# Patient Record
Sex: FEMALE | Race: WHITE | Employment: OTHER | URBAN - METROPOLITAN AREA
[De-identification: names, ages, dates, MRNs, and addresses within clinical notes are randomized per-mention and may not be internally consistent; named-entity substitution may affect disease eponyms.]

---

## 2017-04-10 ENCOUNTER — APPOINTMENT (OUTPATIENT)
Dept: GENERAL RADIOLOGY | Age: 62
End: 2017-04-10
Attending: EMERGENCY MEDICINE
Payer: COMMERCIAL

## 2017-04-10 ENCOUNTER — HOSPITAL ENCOUNTER (EMERGENCY)
Age: 62
Discharge: HOME | End: 2017-04-10
Attending: EMERGENCY MEDICINE
Payer: COMMERCIAL

## 2017-04-10 VITALS
HEIGHT: 64 IN | DIASTOLIC BLOOD PRESSURE: 86 MMHG | SYSTOLIC BLOOD PRESSURE: 125 MMHG | TEMPERATURE: 98.3 F | WEIGHT: 144.84 LBS | HEART RATE: 71 BPM | RESPIRATION RATE: 16 BRPM | OXYGEN SATURATION: 100 % | BODY MASS INDEX: 24.73 KG/M2

## 2017-04-10 DIAGNOSIS — I48.92 ATRIAL FLUTTER WITH RAPID VENTRICULAR RESPONSE (HCC): Primary | ICD-10-CM

## 2017-04-10 LAB
ALBUMIN SERPL BCP-MCNC: 3.8 G/DL (ref 3.5–5)
ALBUMIN/GLOB SERPL: 1.1 {RATIO} (ref 1.1–2.2)
ALP SERPL-CCNC: 68 U/L (ref 45–117)
ALT SERPL-CCNC: 22 U/L (ref 12–78)
ANION GAP BLD CALC-SCNC: 11 MMOL/L (ref 5–15)
AST SERPL W P-5'-P-CCNC: 19 U/L (ref 15–37)
ATRIAL RATE: 375 BPM
BASOPHILS # BLD AUTO: 0 K/UL (ref 0–0.1)
BASOPHILS # BLD: 0 % (ref 0–1)
BILIRUB SERPL-MCNC: 0.3 MG/DL (ref 0.2–1)
BUN SERPL-MCNC: 18 MG/DL (ref 6–20)
BUN/CREAT SERPL: 19 (ref 12–20)
CALCIUM SERPL-MCNC: 9.1 MG/DL (ref 8.5–10.1)
CALCULATED R AXIS, ECG10: 70 DEGREES
CALCULATED T AXIS, ECG11: 40 DEGREES
CHLORIDE SERPL-SCNC: 105 MMOL/L (ref 97–108)
CK SERPL-CCNC: 153 U/L (ref 26–192)
CO2 SERPL-SCNC: 28 MMOL/L (ref 21–32)
CREAT SERPL-MCNC: 0.94 MG/DL (ref 0.55–1.02)
DIAGNOSIS, 93000: NORMAL
EOSINOPHIL # BLD: 0.2 K/UL (ref 0–0.4)
EOSINOPHIL NFR BLD: 2 % (ref 0–7)
ERYTHROCYTE [DISTWIDTH] IN BLOOD BY AUTOMATED COUNT: 13.5 % (ref 11.5–14.5)
GLOBULIN SER CALC-MCNC: 3.4 G/DL (ref 2–4)
GLUCOSE SERPL-MCNC: 142 MG/DL (ref 65–100)
HCT VFR BLD AUTO: 39.7 % (ref 35–47)
HGB BLD-MCNC: 13.4 G/DL (ref 11.5–16)
LYMPHOCYTES # BLD AUTO: 27 % (ref 12–49)
LYMPHOCYTES # BLD: 2.7 K/UL (ref 0.8–3.5)
MAGNESIUM SERPL-MCNC: 2.2 MG/DL (ref 1.6–2.4)
MCH RBC QN AUTO: 30.5 PG (ref 26–34)
MCHC RBC AUTO-ENTMCNC: 33.8 G/DL (ref 30–36.5)
MCV RBC AUTO: 90.4 FL (ref 80–99)
MONOCYTES # BLD: 1.1 K/UL (ref 0–1)
MONOCYTES NFR BLD AUTO: 11 % (ref 5–13)
NEUTS SEG # BLD: 5.8 K/UL (ref 1.8–8)
NEUTS SEG NFR BLD AUTO: 60 % (ref 32–75)
PLATELET # BLD AUTO: 316 K/UL (ref 150–400)
POTASSIUM SERPL-SCNC: 4.1 MMOL/L (ref 3.5–5.1)
PROT SERPL-MCNC: 7.2 G/DL (ref 6.4–8.2)
Q-T INTERVAL, ECG07: 328 MS
QRS DURATION, ECG06: 74 MS
QTC CALCULATION (BEZET), ECG08: 420 MS
RBC # BLD AUTO: 4.39 M/UL (ref 3.8–5.2)
SODIUM SERPL-SCNC: 144 MMOL/L (ref 136–145)
TROPONIN I SERPL-MCNC: <0.04 NG/ML
TSH SERPL DL<=0.05 MIU/L-ACNC: 2.09 UIU/ML (ref 0.36–3.74)
VENTRICULAR RATE, ECG03: 99 BPM
WBC # BLD AUTO: 9.8 K/UL (ref 3.6–11)

## 2017-04-10 PROCEDURE — 74011250636 HC RX REV CODE- 250/636: Performed by: EMERGENCY MEDICINE

## 2017-04-10 PROCEDURE — 99285 EMERGENCY DEPT VISIT HI MDM: CPT

## 2017-04-10 PROCEDURE — 84443 ASSAY THYROID STIM HORMONE: CPT | Performed by: EMERGENCY MEDICINE

## 2017-04-10 PROCEDURE — 82550 ASSAY OF CK (CPK): CPT | Performed by: EMERGENCY MEDICINE

## 2017-04-10 PROCEDURE — 74011250637 HC RX REV CODE- 250/637: Performed by: EMERGENCY MEDICINE

## 2017-04-10 PROCEDURE — 36415 COLL VENOUS BLD VENIPUNCTURE: CPT | Performed by: EMERGENCY MEDICINE

## 2017-04-10 PROCEDURE — 71010 XR CHEST PORT: CPT

## 2017-04-10 PROCEDURE — 84484 ASSAY OF TROPONIN QUANT: CPT | Performed by: EMERGENCY MEDICINE

## 2017-04-10 PROCEDURE — 93005 ELECTROCARDIOGRAM TRACING: CPT

## 2017-04-10 PROCEDURE — 80053 COMPREHEN METABOLIC PANEL: CPT | Performed by: EMERGENCY MEDICINE

## 2017-04-10 PROCEDURE — 83735 ASSAY OF MAGNESIUM: CPT | Performed by: EMERGENCY MEDICINE

## 2017-04-10 PROCEDURE — 85025 COMPLETE CBC W/AUTO DIFF WBC: CPT | Performed by: EMERGENCY MEDICINE

## 2017-04-10 RX ORDER — MINERAL OIL
180 ENEMA (ML) RECTAL
Status: ON HOLD | COMMUNITY
End: 2017-08-31

## 2017-04-10 RX ORDER — METOPROLOL SUCCINATE 25 MG/1
12.5 TABLET, EXTENDED RELEASE ORAL DAILY
Qty: 30 TAB | Refills: 0 | Status: ON HOLD | OUTPATIENT
Start: 2017-04-10 | End: 2017-06-01

## 2017-04-10 RX ORDER — BENZOCAINE .13; .15; .5; 2 G/100G; G/100G; G/100G; G/100G
GEL ORAL
COMMUNITY
End: 2019-06-29

## 2017-04-10 RX ORDER — METOPROLOL SUCCINATE 25 MG/1
12.5 TABLET, EXTENDED RELEASE ORAL
Status: COMPLETED | OUTPATIENT
Start: 2017-04-10 | End: 2017-04-10

## 2017-04-10 RX ORDER — PANTOPRAZOLE SODIUM 40 MG/1
40 TABLET, DELAYED RELEASE ORAL DAILY
Qty: 30 TAB | Refills: 0 | Status: SHIPPED | OUTPATIENT
Start: 2017-04-10 | End: 2017-05-10

## 2017-04-10 RX ORDER — GUAIFENESIN 100 MG/5ML
162 LIQUID (ML) ORAL
Status: COMPLETED | OUTPATIENT
Start: 2017-04-10 | End: 2017-04-10

## 2017-04-10 RX ORDER — PREDNISOLONE SODIUM PHOSPHATE 10 MG/ML
1 SOLUTION/ DROPS OPHTHALMIC 4 TIMES DAILY
Status: ON HOLD | COMMUNITY
End: 2017-08-31

## 2017-04-10 RX ORDER — AZELASTINE HYDROCHLORIDE 0.5 MG/ML
SOLUTION/ DROPS OPHTHALMIC 2 TIMES DAILY
COMMUNITY
End: 2017-06-03

## 2017-04-10 RX ADMIN — ASPIRIN 81 MG 162 MG: 81 TABLET ORAL at 18:39

## 2017-04-10 RX ADMIN — METOPROLOL SUCCINATE 12.5 MG: 25 TABLET, EXTENDED RELEASE ORAL at 18:39

## 2017-04-10 RX ADMIN — SODIUM CHLORIDE 1000 ML: 900 INJECTION, SOLUTION INTRAVENOUS at 16:33

## 2017-04-10 NOTE — DISCHARGE INSTRUCTIONS
Learning About Atrial Fibrillation  What is atrial fibrillation? Atrial fibrillation (say \"AY-tree-gary alm-qwdl-ZAC-shun\") is the most common type of irregular heartbeat (arrhythmia). Normally, the heart beats in a strong, steady rhythm. In atrial fibrillation, a problem with the heart's electrical system causes the two upper parts of the heart (the atria) to quiver, or fibrillate. Your heart rate also may be faster than normal.  Atrial fibrillation can be dangerous because if the heartbeat isn't strong and steady, blood can collect, or pool, in the atria. And pooled blood is more likely to form clots. Clots can travel to the brain, block blood flow, and cause a stroke. Atrial fibrillation can also lead to heart failure. Treatment for atrial fibrillation helps prevent stroke and heart failure. It also helps relieve symptoms. Atrial fibrillation is often caused by another heart problem. It may happen after heart surgery. It may also be caused by other problems, such as an overactive thyroid gland or lung disease. Many people with atrial fibrillation are able to live full and active lives. What are the symptoms? Some people feel symptoms when they have episodes of atrial fibrillation. But other people don't notice any symptoms. If you have symptoms, you may feel:  · A fluttering, racing, or pounding feeling in your chest called palpitations. · Weak or tired. · Dizzy or lightheaded. · Short of breath. · Chest pain. · Confused. You may notice signs of atrial fibrillation when you check your pulse. Your pulse may seem uneven or fast.  What can you expect when you have atrial fibrillation? At first, spells of atrial fibrillation may come on suddenly and last a short time. It may go away on its own or it goes away after treatment. This is called paroxysmal atrial fibrillation. Over time, the spells may last longer and occur more often. They often don't go away on their own.   How is it treated? Treatments can help you feel better and prevent future problems, especially stroke and heart failure. The main types of treatment slow the heart rate, control the heart rhythm, and help prevent stroke. Your treatment will depend on the cause of your atrial fibrillation, your symptoms, and your risk for stroke. · Heart rate treatment. Medicine may be used to slow your heart rate. Your heartbeat may still be irregular. But these medicines keep your heart from beating too fast. They may also help relieve your symptoms. · Heart rhythm treatment. Different treatments may be used to try to stop atrial fibrillation and keep it from returning. They can also relieve symptoms. These treatments include medicine, electrical cardioversion to shock the heart back to a normal rhythm, a procedure called catheter ablation, and heart surgery. · Stroke prevention. You and your doctor can decide how to lower your risk. You may decide to take a blood-thinning medicine, such as aspirin or an anticoagulant. How can you live well with it? You can live well and help manage atrial fibrillation by having a heart-healthy lifestyle. To have a heart-healthy lifestyle:  · Don't smoke. · Eat heart-healthy foods. · Be active. Talk to your doctor about what type and level of exercise is safe for you. · Stay at a healthy weight. Lose weight if you need to. · Manage stress. · Avoid alcohol if it triggers symptoms. · Manage other health problems such as high blood pressure, high cholesterol, and diabetes. · Avoid getting sick from the flu. Get a flu shot every year. When should you call for help? Call 911 anytime you think you may need emergency care. For example, call if:  · You have symptoms of a stroke. These may include:  ¨ Sudden numbness, tingling, weakness, or loss of movement in your face, arm, or leg, especially on only one side of your body. ¨ Sudden vision changes. ¨ Sudden trouble speaking.   ¨ Sudden confusion or trouble understanding simple statements. ¨ Sudden problems with walking or balance. ¨ A sudden, severe headache that is different from past headaches. Call your doctor now or seek immediate medical care if:  · You have new or increased shortness of breath. · You feel dizzy or lightheaded, or you feel like you may faint. Watch closely for changes in your health, and be sure to contact your doctor if you have any problems. Follow-up care is a key part of your treatment and safety. Be sure to make and go to all appointments, and call your doctor if you are having problems. It's also a good idea to know your test results and keep a list of the medicines you take. Where can you learn more? Go to http://mo-miguel.info/. Enter [phone] in the search box to learn more about \"Learning About Atrial Fibrillation. \"  Current as of: March 28, 2016  Content Version: 11.2  © 2265-6471 [employer]. Care instructions adapted under license by [employer] (which disclaims liability or warranty for this information). If you have questions about a medical condition or this instruction, always ask your healthcare professional. [place] [address] any warranty or liability for your use of this information. Deciding Between Electrical Cardioversion and Rate Control Medicines for Atrial Fibrillation  What is atrial fibrillation? Atrial fibrillation (say \"AY-tree-gary vpm-guqt-YZQ-shun\") is a kind of uneven heartbeat. It can make you feel lightheaded and dizzy. You may feel weak. It also can make you more likely to have a stroke. Electrical cardioversion can return your heart to a normal rhythm. First you'll get medicines to make you sleepy and control pain. Then your doctor will use patches to send an electric current to your heart. This resets the rhythm of your heart. Not everyone with atrial fibrillation needs this treatment.  For some people, taking medicines may be better. Most people can live with an uneven heartbeat. It just has to be kept under control so the heart does not beat too fast.  Use this information to help you and your doctor decide which treatment to choose for atrial fibrillation. What are monte points about this decision? · Electrical cardioversion can return your heart to a normal rhythm. But the problem can come back. The longer you have had atrial fibrillation, the more likely it is to come back after this treatment. · Cardioversion may not work as well when an uneven heartbeat is caused by another heart disease, such as heart failure. · If your symptoms bother you a lot, you may want to try cardioversion. But even if it works, you may still need to take blood thinners to prevent a stroke. · If you don't have symptoms, or if they don't bother you much, you can try medicines to slow your heart rate. And you can take blood thinners to prevent a stroke. · Cardioversion does have risks, such as stroke. Discuss the risks with your doctor. Make sure you understand them. · You may have more than one heart problem. Cardioversion doesn't work as well if you have more than one heart problem. Why might you choose electrical cardioversion? · It restores the normal heart rhythm for most people. · The idea of having an electric shock does not bother you. · Your symptoms bother you a lot. · You have had atrial fibrillation just one time. · You do not have other heart problems. · You may not have to take as many medicines. Or you may not need to take them as long. Why might you choose rate-control medicines? · These medicines keep many people from having symptoms. · You prefer to take medicines rather than have an electric shock. · Your symptoms don't bother you much. · If these medicines don't work, you can still try electrical cardioversion.   Your decision  Thinking about the facts and your feelings can help you make a decision that is right for you. Be sure you understand the benefits and risks of your options. And think about what else you need to do before you make the decision. Where can you learn more? Go to http://mo-miguel.info/. Enter B179 in the search box to learn more about \"Deciding Between Electrical Cardioversion and Rate Control Medicines for Atrial Fibrillation. \"  Current as of: January 27, 2016  Content Version: 11.2  © 4550-6066 [employer]. Care instructions adapted under license by [employer] (which disclaims liability or warranty for this information). If you have questions about a medical condition or this instruction, always ask your healthcare professional. [address] any warranty or liability for your use of this information. 2 BABY ASPIRIN EVERYDAY (162mg)     CALL DR. MCKEON'S OFFICE IN THE MORNING, I DISCUSSED YOUR CARE WITH DR. Crawford Come THIS AFTERNOON WHO RECOMMENDED YOU FOLLOW-UP WITH HIM TO DISCUSS OTHER POSSIBLE TREATMENT PLANS

## 2017-04-10 NOTE — ED NOTES
Prior to the dizziness, pt was mulching in the yard. She became dizzy and lost vision in her left eye for approx 5 min.

## 2017-04-10 NOTE — ED PROVIDER NOTES
HPI Comments: Aaliyah Snyder is a 57 y.o. female with pertinent PMHx of anxiety, SVT, PUD and GERD presenting ambulatory to the ED c/o palpitations x ~1405 today, which began as she was working in her yard today. Pt states that \"I just don't feel right\". Pt notes an associated symptom of generalized weakness. Pt states that she experienced sudden onset of left eye blindness with onset of her palpitations. Pt states that she thought it was retinal detachment, but states that it independently resolved after ~5-6 minutes. Pt states \"it started out as just bright and then the color came back\". Pt states that she did not call EMS, as she wanted to get \"cleaned up\" after working in the yard. Pt states that she experienced another episode of left eye blindness when she bent over as she was getting ready to come to the ED. Pt denies any recent increase of caffeine intake. Pt denies any history of thyroid problems. Pt notes that she has had A fib ~3-4 years ago, but did not need cardioversion in the ED as she independently cardioverted with IV placement. Pt states that she followed up with a cardiologist after this episode and had a normal Echo. Pt states that her resting heart rate is in the 50s and notes that she exercises regularly. Pt denies any blood thinner or ASA use. Pt notes N/V/D over 1 week ago and denies any current symptoms. Pt specifically denies any chest pain or SOB. PCP: Fara Yao MD  Cardiologist: Dr. Luma Mercado Hx: - tobacco use, + alcohol use, - illicit drug use    There are no other complaints, changes, or physical findings at this time. The history is provided by the patient. No  was used.         Past Medical History:   Diagnosis Date    Depression     GERD (gastroesophageal reflux disease)     H/O hammer toe correction     Nausea & vomiting     Osteoarthritis     Psychiatric disorder     anxiety, seasonal affective disorder    PUD (peptic ulcer disease)  SVT (supraventricular tachycardia) (HCC)        Past Surgical History:   Procedure Laterality Date    HX BREAST BIOPSY Right     40 years ago    HX CATARACT REMOVAL      bilateral    HX GYN      HX HEENT      L pupil does not react; h/o bilateral retina surgery    HX HYSTERECTOMY      HX ORTHOPAEDIC      left thumb surgery    HX ORTHOPAEDIC      right hammer toe repair with screw    HX RETINAL DETACHMENT REPAIR      bilateral     MS EGD TRANSORAL CONTROL BLEEDING ANY METHOD  10/2/2013              Family History:   Problem Relation Age of Onset    Heart Disease Mother     Kidney Disease Mother        Social History     Social History    Marital status:      Spouse name: N/A    Number of children: N/A    Years of education: N/A     Occupational History    Not on file. Social History Main Topics    Smoking status: Never Smoker    Smokeless tobacco: Never Used    Alcohol use 0.0 oz/week      Comment: 5 per week    Drug use: No    Sexual activity: Yes     Partners: Male     Birth control/ protection: Surgical     Other Topics Concern    Not on file     Social History Narrative         ALLERGIES: Latex; Adhesive tape-silicones; Erythromycin; Neomycin; Nickel; and Nsaids (non-steroidal anti-inflammatory drug)    Review of Systems   Constitutional: Negative for appetite change, chills, fatigue and fever. HENT: Negative. Negative for congestion, rhinorrhea, sinus pressure and sore throat. Eyes: Positive for visual disturbance (left eye with blindness). Respiratory: Negative. Negative for cough, choking, chest tightness, shortness of breath and wheezing. Cardiovascular: Positive for palpitations. Negative for chest pain and leg swelling. Gastrointestinal: Negative for abdominal pain, constipation, diarrhea, nausea and vomiting. Endocrine: Negative. Genitourinary: Negative. Negative for difficulty urinating, dysuria, flank pain and urgency. Musculoskeletal: Negative. Skin: Negative. Neurological: Positive for weakness (diffuse). Negative for dizziness, speech difficulty, light-headedness, numbness and headaches. Psychiatric/Behavioral: Negative. All other systems reviewed and are negative. Vitals:    04/10/17 1514   BP: 125/86   Pulse: 71   Resp: 16   Temp: 98.3 °F (36.8 °C)   SpO2: 100%   Weight: 65.7 kg (144 lb 13.5 oz)   Height: 5' 4\" (1.626 m)            Physical Exam   Constitutional: She is oriented to person, place, and time. She appears well-developed and well-nourished. No distress. HENT:   Head: Normocephalic and atraumatic. Mouth/Throat: Oropharynx is clear and moist. No oropharyngeal exudate. Eyes: Conjunctivae and EOM are normal. Pupils are equal, round, and reactive to light. Neck: Normal range of motion. Neck supple. No JVD present. No tracheal deviation present. Cardiovascular: Normal heart sounds and intact distal pulses. No murmur heard. Tachycardia, irregular   Pulmonary/Chest: Effort normal and breath sounds normal. No stridor. No respiratory distress. She has no wheezes. She has no rales. She exhibits no tenderness. Abdominal: Soft. She exhibits no distension. There is no tenderness. There is no rebound and no guarding. Musculoskeletal: Normal range of motion. She exhibits no edema or tenderness. Neurological: She is alert and oriented to person, place, and time. No cranial nerve deficit. No focal motor or sensory deficits, no speech  difficulty   Skin: Skin is warm and dry. She is not diaphoretic. Psychiatric: She has a normal mood and affect. Her behavior is normal.   Nursing note and vitals reviewed.        MDM  Number of Diagnoses or Management Options  Atrial flutter with rapid ventricular response [facility]):   Diagnosis management comments: DDx: Osvaldo Berg with RVR, arrhythmia, SVT, electrolyte abnormality, thyroid dysfunction, TIA       Amount and/or Complexity of Data Reviewed  Clinical lab tests: ordered and reviewed  Tests in the radiology section of CPT®: ordered and reviewed  Tests in the medicine section of CPT®: ordered and reviewed  Review and summarize past medical records: yes  Discuss the patient with other providers: yes (Cardiology)  Independent visualization of images, tracings, or specimens: yes    Patient Progress  Patient progress: stable    ED Course       Procedures     Initial EKG- Atrial flutter w/ variable block, rate 99, normal qrs, normal axis, no acute ST-T wave changes, Mercy Bowman DO    Progress Note:  4:16 PM  Made aware by nursing staff that the pt has returned to normal rhythm after IV placement. Written by Akbar Thornton, ED Scribe, as dictated by Fidelia Casas DO. Repeat EKG- Sinus, w/ PAC, rate 78, normal axis, normal pr/qrs, no acute ST-T wave changes, Mercy Bowman DO      Consult Note:  5:09 PM  Fidelia Casas DO spoke with Dr. Silvio Ferrera,  Specialty: Cardiology  Discussed pt's hx, disposition, and available diagnostic and imaging results. Reviewed care plans. Consultant agrees with plans as outlined. Dr. Silvio Ferrera advises starting the pt on 2 baby ASA and Toprol 12.5 mg daily. Pt should follow up with Dr. Celena Davey in the office. Written by Akbar Thornton, ED Scribe, as dictated by Fidelia Casas DO. Progress Note:  6:14 PM  At time of discharge, pt requests Protonix as she has a hx of bleeding ulcers and is considered w/ ASA therapy. Pt instructed if significant fatigue, bradycardia w/ dizziness, to hold BB until follow-up with Cardiology    Written by Kostas Davila.  Tj Thornton, ED Scribe, as dictated by Fidelia Casas DO.      LABORATORY TESTS:  Recent Results (from the past 12 hour(s))   EKG, 12 LEAD, INITIAL    Collection Time: 04/10/17  3:21 PM   Result Value Ref Range    Ventricular Rate 99 BPM    Atrial Rate 375 BPM    QRS Duration 74 ms    Q-T Interval 328 ms    QTC Calculation (Bezet) 420 ms    Calculated R Axis 70 degrees    Calculated T Axis 40 degrees    Diagnosis Atrial flutter with variable AV block  When compared with ECG of 19-JUL-2012 19:01,  Atrial flutter has replaced Sinus rhythm  ST no longer elevated in Anterior leads  Confirmed by Mary Moore ([id number]) on 4/10/2017 5:27:49 PM     CBC WITH AUTOMATED DIFF    Collection Time: 04/10/17  3:24 PM   Result Value Ref Range    WBC 9.8 3.6 - 11.0 K/uL    RBC 4.39 3.80 - 5.20 M/uL    HGB 13.4 11.5 - 16.0 g/dL    HCT 39.7 35.0 - 47.0 %    MCV 90.4 80.0 - 99.0 FL    MCH 30.5 26.0 - 34.0 PG    MCHC 33.8 30.0 - 36.5 g/dL    RDW 13.5 11.5 - 14.5 %    PLATELET 352 889 - 397 K/uL    NEUTROPHILS 60 32 - 75 %    LYMPHOCYTES 27 12 - 49 %    MONOCYTES 11 5 - 13 %    EOSINOPHILS 2 0 - 7 %    BASOPHILS 0 0 - 1 %    ABS. NEUTROPHILS 5.8 1.8 - 8.0 K/UL    ABS. LYMPHOCYTES 2.7 0.8 - 3.5 K/UL    ABS. MONOCYTES 1.1 (H) 0.0 - 1.0 K/UL    ABS. EOSINOPHILS 0.2 0.0 - 0.4 K/UL    ABS. BASOPHILS 0.0 0.0 - 0.1 K/UL   METABOLIC PANEL, COMPREHENSIVE    Collection Time: 04/10/17  3:24 PM   Result Value Ref Range    Sodium 144 136 - 145 mmol/L    Potassium 4.1 3.5 - 5.1 mmol/L    Chloride 105 97 - 108 mmol/L    CO2 28 21 - 32 mmol/L    Anion gap 11 5 - 15 mmol/L    Glucose 142 (H) 65 - 100 mg/dL    BUN 18 6 - 20 MG/DL    Creatinine 0.94 0.55 - 1.02 MG/DL    BUN/Creatinine ratio 19 12 - 20      GFR est AA >60 >60 ml/min/1.73m2    GFR est non-AA 60 (L) >60 ml/min/1.73m2    Calcium 9.1 8.5 - 10.1 MG/DL    Bilirubin, total 0.3 0.2 - 1.0 MG/DL    ALT (SGPT) 22 12 - 78 U/L    AST (SGOT) 19 15 - 37 U/L    Alk.  phosphatase 68 45 - 117 U/L    Protein, total 7.2 6.4 - 8.2 g/dL    Albumin 3.8 3.5 - 5.0 g/dL    Globulin 3.4 2.0 - 4.0 g/dL    A-G Ratio 1.1 1.1 - 2.2     CK W/ REFLX CKMB    Collection Time: 04/10/17  3:24 PM   Result Value Ref Range     26 - 192 U/L   TROPONIN I    Collection Time: 04/10/17  3:24 PM   Result Value Ref Range    Troponin-I, Qt. <0.04 <0.05 ng/mL   MAGNESIUM    Collection Time: 04/10/17  3:24 PM   Result Value Ref Range Magnesium 2.2 1.6 - 2.4 mg/dL   TSH 3RD GENERATION    Collection Time: 04/10/17  3:24 PM   Result Value Ref Range    TSH 2.09 0.36 - 3.74 uIU/mL   EKG, 12 LEAD, INITIAL    Collection Time: 04/10/17  4:06 PM   Result Value Ref Range    Ventricular Rate 78 BPM    Atrial Rate 78 BPM    P-R Interval 186 ms    QRS Duration 84 ms    Q-T Interval 364 ms    QTC Calculation (Bezet) 414 ms    Calculated P Axis 71 degrees    Calculated R Axis 70 degrees    Calculated T Axis 46 degrees    Diagnosis       Sinus rhythm with premature atrial complexes  Otherwise normal ECG  When compared with ECG of 10-APR-2017 15:21,  MANUAL COMPARISON REQUIRED, DATA IS UNCONFIRMED         IMAGING RESULTS:  CXR Results  (Last 48 hours)               04/10/17 1602  XR CHEST PORT Final result    Impression:  IMPRESSION:   1. No acute process       Narrative:  EXAM:  XR CHEST PORT       INDICATION:  chest pain, palpitations beginning today       COMPARISON:  6/27/2014       FINDINGS: A portable AP radiograph of the chest was obtained at 1553 hours. The   patient is on a cardiac monitor. The heart size is normal. The lungs are clear. No acute process. There are mild degenerative changes of the thoracic spine. MEDICATIONS GIVEN:  Medications   metoprolol succinate (TOPROL-XL) XL tablet 12.5 mg (not administered)   aspirin chewable tablet 162 mg (not administered)   sodium chloride 0.9 % bolus infusion 1,000 mL (1,000 mL IntraVENous New Bag 4/10/17 1633)       IMPRESSION:  1. Atrial flutter with rapid ventricular response ([facility] [address]        PLAN:  1. Current Discharge Medication List      START taking these medications    Details   metoprolol succinate (TOPROL XL) 25 mg XL tablet Take 0.5 Tabs by mouth daily. Qty: 30 Tab, Refills: 0      pantoprazole (PROTONIX) 40 mg tablet Take 1 Tab by mouth daily for 30 days. Qty: 30 Tab, Refills: 0           2.    Follow-up Information     Follow up With Details Comments [address] [facility] [place] [facility]  [address] [address]  [phone]      [facility] EMERGENCY DEPT  If symptoms worsen [address] Areli Roberts MD   [address]  [address]  [address] [address]  [phone]          Return to ED if worse   DISCHARGE NOTE:  5:59 PM  The patient is ready for discharge. The patient's signs, symptoms, diagnosis, and discharge instructions have been discussed and the patient and/or family has conveyed their understanding. The patient and/or family is to follow up as recommended or return to the ER should their symptoms worsen. Plan has been discussed and the patient and/or family is in agreement. Written by Sylvia Lehman, ED Scribe, as dictated by Cinthya West DO. Attestation: This note is prepared by Gordon Buerger. Magan Lehman, acting as Scribe for Cinthya West, [address] [place] [address], DO: The scribe's documentation has been prepared under my direction and personally reviewed by me in its entirety. I confirm that the note above accurately reflects all work, treatment, procedures, and medical decision making performed by me.

## 2017-04-10 NOTE — ED NOTES
Pt complains of an episode of dizziness followed by rapid heart rate without chest pain or shortness of breath that started around 1400. Pt has had one episode of this before, about 3-4 years ago.

## 2017-04-11 LAB
ATRIAL RATE: 78 BPM
CALCULATED P AXIS, ECG09: 71 DEGREES
CALCULATED R AXIS, ECG10: 70 DEGREES
CALCULATED T AXIS, ECG11: 46 DEGREES
DIAGNOSIS, 93000: NORMAL
P-R INTERVAL, ECG05: 186 MS
Q-T INTERVAL, ECG07: 364 MS
QRS DURATION, ECG06: 84 MS
QTC CALCULATION (BEZET), ECG08: 414 MS
VENTRICULAR RATE, ECG03: 78 BPM

## 2017-04-14 ENCOUNTER — HOSPITAL ENCOUNTER (OUTPATIENT)
Dept: VASCULAR SURGERY | Age: 62
Discharge: HOME | End: 2017-04-14
Attending: OPHTHALMOLOGY
Payer: COMMERCIAL

## 2017-04-14 ENCOUNTER — HOSPITAL ENCOUNTER (OUTPATIENT)
Dept: NON INVASIVE DIAGNOSTICS | Age: 62
Discharge: HOME | End: 2017-04-14
Attending: OPHTHALMOLOGY
Payer: COMMERCIAL

## 2017-04-14 DIAGNOSIS — H34.212 PARTIAL RETINAL ARTERY OCCLUSION OF LEFT EYE: ICD-10-CM

## 2017-04-14 PROCEDURE — 93880 EXTRACRANIAL BILAT STUDY: CPT

## 2017-04-14 PROCEDURE — 93306 TTE W/DOPPLER COMPLETE: CPT

## 2017-04-14 NOTE — PROCEDURES
[address]  *** FINAL REPORT ***    Name: Ara Gan  MRN: [id number]    Outpatient  : [address] Order #: [id number]  [address] [facility] Visit #: [id number]  Date: 2017    TYPE OF TEST: Cerebrovascular Duplex    REASON FOR TEST  retinal occlusion/detachment l eye    Right Carotid:-             Proximal               Mid                 Distal  cm/s  Systolic  Diastolic  Systolic  Diastolic  Systolic  Diastolic  CCA:     59.9      23.0                            82.0      23.0  Bulb:  ECA:     99.0  ICA:     69.0      26.0                            73.0      29.0  ICA/CCA:  0.8       1.1    ICA Stenosis:    Right Vertebral:-  Finding: Antegrade  Sys:       89.0  Katie:    Right Subclavian:    Left Carotid:-            Proximal                Mid                 Distal  cm/s  Systolic  Diastolic  Systolic  Diastolic  Systolic  Diastolic  CCA:     50.8      25.0                            81.0      27.0  Bulb:  ECA:     93.0  ICA:     77.0      32.0                            57.0      22.0  ICA/CCA:  1.0       1.2    ICA Stenosis:    Left Vertebral:-  Finding: Antegrade  Sys:       50.0  Katie:    Left Subclavian:    INTERPRETATION/FINDINGS  PROCEDURE:  Color duplex ultrasound imaging of extracranial  cerebrovascular arteries. FINDINGS:       Right:  Internal carotid velocity is within normal limits, there  is no observed narrowing of the flow channel on color Doppler imaging,   and no plaque is visualized on B-mode imaging. The common and  external carotid arteries are patent and without evidence of  hemodynamically significant stenosis. Left:  Internal carotid velocity is within normal limits, there  is no observed narrowing of the flow channel on color Doppler imaging,   and no plaque is visualized on B-mode imaging. The common and  external carotid arteries are patent and without evidence of  hemodynamically significant stenosis.     IMPRESSION:  Consistent with no stenosis of the right internal carotid   and no stenosis of the left internal carotid. Vertebrals are patent  with antegrade flow. Asymmetric vertebrals with right peak systolic  velocity greater than left. ADDITIONAL COMMENTS    I have personally reviewed the data relevant to the interpretation of  this  study.     TECHNOLOGIST: Judy Jiménez RVT  Signed: 04/14/2017 03:18 PM    PHYSICIAN: Blanka Peña MD  Signed: 04/14/2017 04:52 PM

## 2017-05-16 ENCOUNTER — OFFICE VISIT (OUTPATIENT)
Dept: SURGERY | Age: 62
End: 2017-05-16

## 2017-05-16 VITALS
OXYGEN SATURATION: 100 % | RESPIRATION RATE: 18 BRPM | WEIGHT: 145.2 LBS | SYSTOLIC BLOOD PRESSURE: 154 MMHG | BODY MASS INDEX: 24.79 KG/M2 | HEIGHT: 64 IN | DIASTOLIC BLOOD PRESSURE: 96 MMHG | HEART RATE: 62 BPM

## 2017-05-16 DIAGNOSIS — R10.31 RIGHT LOWER QUADRANT ABDOMINAL PAIN: Primary | ICD-10-CM

## 2017-05-16 NOTE — MR AVS SNAPSHOT
Visit Information Date & Time Provider Department Dept. Phone Encounter #  
 5/16/2017 10:00 AM Savanna Chan MD [facility] [place] [address] [id number] Upcoming Health Maintenance Date Due Hepatitis C Screening 1955 DTaP/Tdap/Td series (1 - Tdap) 1/5/1974 PAP AKA CERVICAL CYTOLOGY 1/5/1974 FOBT Q 1 YEAR AGE 50-75 1/5/2003 ZOSTER VACCINE AGE 60> 1/5/2013 INFLUENZA AGE 9 TO ADULT 8/1/2017 BREAST CANCER SCRN MAMMOGRAM 9/14/2018 Allergies as of 5/16/2017  Review Complete On: 5/16/2017 By: Savanna Chan MD  
  
 Severity Noted Reaction Type Reactions Latex  03/14/2011   Topical Rash Adhesive Tape-silicones  92/42/4840   Topical Rash Erythromycin  03/14/2011   Topical Rash Neomycin  03/14/2011   Topical Rash Nickel  06/27/2014    Rash  
 Nsaids (Non-steroidal Anti-inflammatory Drug)  06/27/2014    Other (comments) Bleeding ulcers Current Immunizations  Reviewed on 4/10/2017 No immunizations on file. Not reviewed this visit You Were Diagnosed With   
  
 Codes Comments Right lower quadrant abdominal pain    -  Primary ICD-10-CM: R10.31 ICD-9-CM: 789.03 Vitals BP Pulse Resp Height(growth percentile) Weight(growth percentile) SpO2  
 (!) 154/96 (BP 1 Location: Left arm, BP Patient Position: Sitting) 62 18 5' 4\" (1.626 m) 145 lb 3.2 oz (65.9 kg) 100% BMI OB Status Smoking Status 24.92 kg/m2 Hysterectomy Never Smoker BMI and BSA Data Body Mass Index Body Surface Area 24.92 kg/m 2 1.73 m 2 Your Updated Medication List  
  
   
This list is accurate as of: 5/16/17  4:47 PM.  Always use your most recent med list.  
  
  
  
  
 ALPRAZolam 0.25 mg tablet Commonly known as:  Honey Gallery Take  by mouth as needed for Anxiety. azelastine 0.05 % ophthalmic solution Commonly known as:  OPTIVAR Administer  to both eyes two (2) times a day. Use in affected eye(s) bromfenac 0.075 % Drop Apply  to eye. CALCIUM 600 WITH VITAMIN D3 600 mg(1,500mg) -400 unit Cap Generic drug:  Calcium-Cholecalciferol (D3) Take 2 Caps by mouth daily. fexofenadine 180 mg tablet Commonly known as:  Ernestina Erasmo Take 180 mg by mouth.  
  
 metoprolol succinate 25 mg XL tablet Commonly known as:  TOPROL XL Take 0.5 Tabs by mouth daily. multivitamin tablet Commonly known as:  ONE A DAY Take 1 Tab by mouth daily. prednisoLONE sodium phosphate 1 % ophthalmic solution Commonly known as:  INFLAMASE FORTE Administer 1 Drop to both eyes four (4) times daily. PREMARIN 0.625 mg/gram vaginal cream  
Generic drug:  conjugated estrogens Insert 0.5 g into vagina daily. RHINOCORT AQUA 32 mcg/actuation nasal spray Generic drug:  budesonide  
  
 sertraline 50 mg tablet Commonly known as:  ZOLOFT Take  by mouth daily. To-Do List   
 05/16/2017 Imaging:  CT ABD PELV W CONT   
  
 05/18/2017 2:00 PM  
  Appointment with [facility] CT 2 at [facility] ([phone]) CONTRAST STUDY: 1. The patient should not eat solid food four hours before the appointment but should be encouraged to drink clear liquids. 2.  If you have to drink oral contrast, please pick it up any weekday prior to your appointment, if you cannot please check in 2 hrs before appt time. 3.  The patient will require IV access for contrast administration. 4.  The patient should not take Ibuprofen (Advil, Motrin, etc.) and Naproxen Sodium (Aleve, etc.)  on the day of the exam. Stopping non-steroidal anti-inflammatory agents (NSAIDs) like Ibuprofen decreases the risk of kidney damage from the x-ray contrast (dye). 5.  [facility] films/images pertaining to the area of interest with you on the day of appointment.  6.  Bring current lab work if available (within last 90 days [facility]) ***If scheduled at [facility] is not available, labs will need to be done before appointment*** 7. Check in at registration at least 30 minutes before appt time unless you were instructed to do otherwise. 06/01/2017 10:30 AM  
  Appointment with CATH EP ROOM [facility] at [address] [facility] ([phone]) NPO AFTER MIDNIGHT! ROUTINE CASES:  Please arrive 2 hour prior to your scheduled appointment time. If your scheduled appointment is for 0730, 0800, 0815, please arrive by 0645. NON ROUTINE CASES:  PATIENTS WHO REQUIRE LABS, X-RAY, EKG, or MEDS:  PLEASE ARRIVE 3 HOURS PRIOR TO YOUR SCHEDULED APPOINTMENT. If you require hydration prior to your procedure, PLEASE ARRIVE 4 HOURS PRIOR TO YOUR APPOINTMENT  **** IT IS THE OFFICE SCHEDULARS RESPONSBILITY TO NOTIFY THE CATH LAB SCHEDULAR IF THE PATIENT WILL REQUIRE ANY ADDITIONAL TIME FOR PREP FROM ROUTINE CASE ***** Introducing hospitals & [facility] SERVICES! Dear Coleman Gardner: Thank you for requesting a [facility] account. Our records indicate that you already have an active [facility] account. You can access your account anytime at [email or web address] Did you know that you can access your hospital and ER discharge instructions at any time in [facility]? You can also review all of your test results from your hospital stay or ER visit. Additional Information If you have questions, please visit the Frequently Asked Questions section of the [facility] website at [email or web address] Remember, [facility] is NOT to be used for urgent needs. For medical emergencies, dial 911. Now available from your iPhone and Android! Please provide this summary of care documentation to your next provider. Your primary care clinician is listed as Rui Mei. If you have any questions after today's visit, please call [phone].

## 2017-05-16 NOTE — PROGRESS NOTES
Surgery Consult:  RLQ pain  Requesting physician: Dr. James Epps    Subjective:   Patient 57 y.o.  female presents for evaluation of RLQ pain. Patient was doing core exercises 2 weeks ago when she developed sharp pain in the RLQ where her previous hysterectomy incision is. Since then, patient has been having constant achy pain without obvious palliative factors. No radiation. Patient also reports noticing ?small bulge over the area. Patient denies any other symptoms. No nausea or vomiting. No change in bowel habits. No diarrhea or constipation. No F/C/S. Prior abdominal surgeries include open RENETTA for endometriosis. Past Medical & Surgical History:  Past Medical History:   Diagnosis Date    Depression     GERD (gastroesophageal reflux disease)     H/O hammer toe correction     Nausea & vomiting     Osteoarthritis     Psychiatric disorder     anxiety, seasonal affective disorder    PUD (peptic ulcer disease)     SV[facility]ra[facility]ricular tachycardia[facility])       Past Surgical History:   Procedure Laterality Date    HX BREAST BIOPSY Right     40 years ago    HX CATARACT REMOVAL      bilateral    HX GYN      HX HEENT      L pupil does not react; h/o bilateral retina surgery    HX HYSTERECTOMY      HX ORTHOPAEDIC      left thumb surgery    HX ORTHOPAEDIC      right hammer toe repair with screw    HX RETINAL DETACHMENT REPAIR      bilateral     IL EGD TRANSORAL CONTROL BLEEDING ANY METHOD  10/2/2013            Social History:  Social History     Social History    Marital status:      Spouse name: N/A    Number of children: N/A    Years of education: N/A     Occupational History    Not on file.      Social History Main Topics    Smoking status: Never Smoker    Smokeless tobacco: Never Used    Alcohol use 0.0 oz/week      Comment: 5 per week    Drug use: Yes     Special: Prescription    Sexual activity: Yes     Partners: Male     Birth control/ protection: Surgical     Other Topics Concern    Not on file     Social History Narrative        Family History:  Family History   Problem Relation Age of Onset    Heart Disease Mother     Kidney Disease Mother     Heart Disease Father     Hypertension Father         Medications:  Current Outpatient Prescriptions   Medication Sig    conjugated estrogens (PREMARIN) 0.625 mg/gram vaginal cream Insert 0.5 g into vagina daily.  bromfenac 0.075 % drop Apply  to eye.  prednisoLONE sodium phosphate (INFLAMASE FORTE) 1 % ophthalmic solution Administer 1 Drop to both eyes four (4) times daily.  fexofenadine (ALLEGRA) 180 mg tablet Take 180 mg by mouth.  budesonide (RHINOCORT AQUA) 32 mcg/actuation nasal spray     azelastine (OPTIVAR) 0.05 % ophthalmic solution Administer  to both eyes two (2) times a day. Use in affected eye(s)    metoprolol succinate (TOPROL XL) 25 mg XL tablet Take 0.5 Tabs by mouth daily.  Calcium-Cholecalciferol, D3, (CALCIUM 600 WITH VITAMIN D3) 600 mg(1,500mg) -400 unit cap Take 2 Caps by mouth daily.  sertraline (ZOLOFT) 50 mg tablet Take  by mouth daily.  ALPRAZolam (XANAX) 0.25 mg tablet Take  by mouth as needed for Anxiety.  multivitamin (ONE A DAY) tablet Take 1 Tab by mouth daily. No current facility-administered medications for this visit. Allergies: Allergies   Allergen Reactions    Latex Rash    Adhesive Tape-Silicones Rash    Erythromycin Rash    Neomycin Rash    Nickel Rash    Nsaids (Non-Steroidal Anti-Inflammatory Drug) Other (comments)     Bleeding ulcers        Review of Systems  A comprehensive review of systems was negative except for that written in the HPI.     Objective:     Exam:    Visit Vitals    BP (!) 154/96 (BP 1 Location: Left arm, BP Patient Position: Sitting)    Pulse 62    Resp 18    Ht 5' 4\" (1.626 m)    Wt 65.9 kg (145 lb 3.2 oz)    SpO2 100%    BMI 24.92 kg/m2     General appearance: alert, cooperative, no distress, appears stated age  Eyes: negative  Lungs: clear to auscultation bilaterally  Heart: regular rate and rhythm  Abdomen: soft, non-distended. Well healed suprapubic incision. +fullness just above the right lateral part of the incision. No obvious fascial defect. Mild tenderness over this area. No rebound or guarding. Extremities: extremities normal, atraumatic, no cyanosis or edema. ALFREDO. Skin: Skin color, texture, turgor normal. No rashes or lesions.    Neurologic: Grossly normal      Assessment:     RLQ pain with ?bulge    Plan:     Abd/pelvic CT scan to r/o hernia

## 2017-05-18 ENCOUNTER — HOSPITAL ENCOUNTER (OUTPATIENT)
Dept: CT IMAGING | Age: 62
Discharge: HOME | End: 2017-05-18
Attending: SURGERY
Payer: COMMERCIAL

## 2017-05-18 DIAGNOSIS — R10.31 RIGHT LOWER QUADRANT ABDOMINAL PAIN: ICD-10-CM

## 2017-05-18 PROCEDURE — 74011636320 HC RX REV CODE- 636/320: Performed by: SURGERY

## 2017-05-18 PROCEDURE — 74011250636 HC RX REV CODE- 250/636: Performed by: SURGERY

## 2017-05-18 PROCEDURE — 74011000255 HC RX REV CODE- 255: Performed by: SURGERY

## 2017-05-18 PROCEDURE — 74177 CT ABD & PELVIS W/CONTRAST: CPT

## 2017-05-18 RX ORDER — SODIUM CHLORIDE 9 MG/ML
50 INJECTION, SOLUTION INTRAVENOUS
Status: COMPLETED | OUTPATIENT
Start: 2017-05-18 | End: 2017-05-18

## 2017-05-18 RX ORDER — BARIUM SULFATE 20 MG/ML
900 SUSPENSION ORAL
Status: COMPLETED | OUTPATIENT
Start: 2017-05-18 | End: 2017-05-18

## 2017-05-18 RX ORDER — SODIUM CHLORIDE 0.9 % (FLUSH) 0.9 %
10 SYRINGE (ML) INJECTION
Status: COMPLETED | OUTPATIENT
Start: 2017-05-18 | End: 2017-05-18

## 2017-05-18 RX ADMIN — IOPAMIDOL 100 ML: 755 INJECTION, SOLUTION INTRAVENOUS at 14:07

## 2017-05-18 RX ADMIN — Medication 10 ML: at 14:07

## 2017-05-18 RX ADMIN — BARIUM SULFATE 900 ML: 21 SUSPENSION ORAL at 14:07

## 2017-05-18 RX ADMIN — SODIUM CHLORIDE 50 ML/HR: 900 INJECTION, SOLUTION INTRAVENOUS at 14:07

## 2017-05-19 ENCOUNTER — TELEPHONE (OUTPATIENT)
Dept: SURGERY | Age: 62
End: 2017-05-19

## 2017-05-19 NOTE — TELEPHONE ENCOUNTER
Results of the CT scan discussed with the patient. No hernia. No surgical intervention required at this time. Results of the CT scan sent to PCP and Dr. Madhavi Kasper (Ortho).

## 2017-06-01 ENCOUNTER — ANESTHESIA (OUTPATIENT)
Dept: NON INVASIVE DIAGNOSTICS | Age: 62
End: 2017-06-01
Payer: COMMERCIAL

## 2017-06-01 ENCOUNTER — ANESTHESIA EVENT (OUTPATIENT)
Dept: NON INVASIVE DIAGNOSTICS | Age: 62
End: 2017-06-01
Payer: COMMERCIAL

## 2017-06-01 ENCOUNTER — HOSPITAL ENCOUNTER (OUTPATIENT)
Dept: NON INVASIVE DIAGNOSTICS | Age: 62
Discharge: HOME | End: 2017-06-01
Attending: INTERNAL MEDICINE | Admitting: INTERNAL MEDICINE
Payer: COMMERCIAL

## 2017-06-01 VITALS
BODY MASS INDEX: 25.87 KG/M2 | WEIGHT: 146 LBS | TEMPERATURE: 97.7 F | HEIGHT: 63 IN | OXYGEN SATURATION: 99 % | HEART RATE: 71 BPM | SYSTOLIC BLOOD PRESSURE: 109 MMHG | DIASTOLIC BLOOD PRESSURE: 58 MMHG | RESPIRATION RATE: 19 BRPM

## 2017-06-01 PROCEDURE — C1894 INTRO/SHEATH, NON-LASER: HCPCS

## 2017-06-01 PROCEDURE — 74011000250 HC RX REV CODE- 250

## 2017-06-01 PROCEDURE — 77030011640 HC PAD GRND REM COVD -A

## 2017-06-01 PROCEDURE — 77030018729 HC ELECTRD DEFIB PAD CARD -B

## 2017-06-01 PROCEDURE — 77030029065 HC DRSG HEMO QCLOT ZMED -B

## 2017-06-01 PROCEDURE — 77030010880 HC CBL EP SUPRME STJU -C

## 2017-06-01 PROCEDURE — C1731 CATH, EP, 20 OR MORE ELEC: HCPCS

## 2017-06-01 PROCEDURE — 74011250636 HC RX REV CODE- 250/636

## 2017-06-01 PROCEDURE — 77030030806 HC PTCH ENSIT NAVX STJU -G

## 2017-06-01 PROCEDURE — 77030015398 HC CBL EP EXT STJU -C

## 2017-06-01 PROCEDURE — C1733 CATH, EP, OTHR THAN COOL-TIP: HCPCS

## 2017-06-01 PROCEDURE — 93613 INTRACARDIAC EPHYS 3D MAPG: CPT

## 2017-06-01 RX ORDER — PROPOFOL 10 MG/ML
INJECTION, EMULSION INTRAVENOUS AS NEEDED
Status: DISCONTINUED | OUTPATIENT
Start: 2017-06-01 | End: 2017-06-01 | Stop reason: HOSPADM

## 2017-06-01 RX ORDER — HEPARIN SODIUM 200 [USP'U]/100ML
INJECTION, SOLUTION INTRAVENOUS
Status: COMPLETED
Start: 2017-06-01 | End: 2017-06-01

## 2017-06-01 RX ORDER — FENTANYL CITRATE 50 UG/ML
INJECTION, SOLUTION INTRAMUSCULAR; INTRAVENOUS
Status: COMPLETED
Start: 2017-06-01 | End: 2017-06-01

## 2017-06-01 RX ORDER — HEPARIN SODIUM 200 [USP'U]/100ML
500 INJECTION, SOLUTION INTRAVENOUS ONCE
Status: COMPLETED | OUTPATIENT
Start: 2017-06-01 | End: 2017-06-01

## 2017-06-01 RX ORDER — MIDAZOLAM HYDROCHLORIDE 1 MG/ML
1-5 INJECTION, SOLUTION INTRAMUSCULAR; INTRAVENOUS
Status: DISCONTINUED | OUTPATIENT
Start: 2017-06-01 | End: 2017-06-01

## 2017-06-01 RX ORDER — DABIGATRAN ETEXILATE 150 MG/1
150 CAPSULE ORAL 2 TIMES DAILY
Qty: 60 CAP | Refills: 2 | Status: ON HOLD | OUTPATIENT
Start: 2017-06-01 | End: 2018-06-27

## 2017-06-01 RX ORDER — SODIUM CHLORIDE 9 MG/ML
INJECTION, SOLUTION INTRAVENOUS
Status: DISCONTINUED | OUTPATIENT
Start: 2017-06-01 | End: 2017-06-01 | Stop reason: HOSPADM

## 2017-06-01 RX ORDER — MIDAZOLAM HYDROCHLORIDE 1 MG/ML
INJECTION, SOLUTION INTRAMUSCULAR; INTRAVENOUS
Status: COMPLETED
Start: 2017-06-01 | End: 2017-06-01

## 2017-06-01 RX ORDER — SODIUM CHLORIDE 0.9 % (FLUSH) 0.9 %
5-10 SYRINGE (ML) INJECTION AS NEEDED
Status: DISCONTINUED | OUTPATIENT
Start: 2017-06-01 | End: 2017-06-01 | Stop reason: HOSPADM

## 2017-06-01 RX ORDER — LIDOCAINE HYDROCHLORIDE 10 MG/ML
1-40 INJECTION INFILTRATION; PERINEURAL
Status: DISCONTINUED | OUTPATIENT
Start: 2017-06-01 | End: 2017-06-01

## 2017-06-01 RX ORDER — FENTANYL CITRATE 50 UG/ML
12.5-5 INJECTION, SOLUTION INTRAMUSCULAR; INTRAVENOUS
Status: DISCONTINUED | OUTPATIENT
Start: 2017-06-01 | End: 2017-06-01

## 2017-06-01 RX ORDER — SODIUM CHLORIDE 0.9 % (FLUSH) 0.9 %
5-10 SYRINGE (ML) INJECTION EVERY 8 HOURS
Status: DISCONTINUED | OUTPATIENT
Start: 2017-06-01 | End: 2017-06-01 | Stop reason: HOSPADM

## 2017-06-01 RX ORDER — LIDOCAINE HYDROCHLORIDE 20 MG/ML
INJECTION, SOLUTION EPIDURAL; INFILTRATION; INTRACAUDAL; PERINEURAL AS NEEDED
Status: DISCONTINUED | OUTPATIENT
Start: 2017-06-01 | End: 2017-06-01 | Stop reason: HOSPADM

## 2017-06-01 RX ORDER — LIDOCAINE HYDROCHLORIDE 10 MG/ML
INJECTION INFILTRATION; PERINEURAL
Status: COMPLETED
Start: 2017-06-01 | End: 2017-06-01

## 2017-06-01 RX ORDER — ACETAMINOPHEN 325 MG/1
650 TABLET ORAL
Status: DISCONTINUED | OUTPATIENT
Start: 2017-06-01 | End: 2017-06-01 | Stop reason: HOSPADM

## 2017-06-01 RX ORDER — ONDANSETRON 2 MG/ML
4 INJECTION INTRAMUSCULAR; INTRAVENOUS
Status: DISCONTINUED | OUTPATIENT
Start: 2017-06-01 | End: 2017-06-01 | Stop reason: HOSPADM

## 2017-06-01 RX ORDER — DOBUTAMINE HYDROCHLORIDE 200 MG/100ML
2.5-1 INJECTION INTRAVENOUS
Status: DISCONTINUED | OUTPATIENT
Start: 2017-06-01 | End: 2017-06-01

## 2017-06-01 RX ORDER — OXYCODONE AND ACETAMINOPHEN 5; 325 MG/1; MG/1
1 TABLET ORAL
Status: DISCONTINUED | OUTPATIENT
Start: 2017-06-01 | End: 2017-06-01 | Stop reason: HOSPADM

## 2017-06-01 RX ORDER — DOBUTAMINE HYDROCHLORIDE 200 MG/100ML
INJECTION INTRAVENOUS
Status: COMPLETED
Start: 2017-06-01 | End: 2017-06-01

## 2017-06-01 RX ADMIN — DOBUTAMINE HYDROCHLORIDE 10 MCG/KG/MIN: 200 INJECTION INTRAVENOUS at 12:44

## 2017-06-01 RX ADMIN — Medication 50 MCG: at 12:52

## 2017-06-01 RX ADMIN — MIDAZOLAM HYDROCHLORIDE 2 MG: 1 INJECTION INTRAMUSCULAR; INTRAVENOUS at 12:26

## 2017-06-01 RX ADMIN — FENTANYL CITRATE 25 MCG: 50 INJECTION, SOLUTION INTRAMUSCULAR; INTRAVENOUS at 12:31

## 2017-06-01 RX ADMIN — MIDAZOLAM HYDROCHLORIDE 2 MG: 1 INJECTION, SOLUTION INTRAMUSCULAR; INTRAVENOUS at 12:56

## 2017-06-01 RX ADMIN — MIDAZOLAM HYDROCHLORIDE 1 MG: 1 INJECTION, SOLUTION INTRAMUSCULAR; INTRAVENOUS at 12:45

## 2017-06-01 RX ADMIN — FENTANYL CITRATE 50 MCG: 50 INJECTION, SOLUTION INTRAMUSCULAR; INTRAVENOUS at 12:27

## 2017-06-01 RX ADMIN — MIDAZOLAM HYDROCHLORIDE 2 MG: 1 INJECTION INTRAMUSCULAR; INTRAVENOUS at 12:52

## 2017-06-01 RX ADMIN — MIDAZOLAM HYDROCHLORIDE 2 MG: 1 INJECTION, SOLUTION INTRAMUSCULAR; INTRAVENOUS at 12:52

## 2017-06-01 RX ADMIN — SODIUM CHLORIDE: 9 INJECTION, SOLUTION INTRAVENOUS at 13:09

## 2017-06-01 RX ADMIN — LIDOCAINE HYDROCHLORIDE 20 ML: 10 INJECTION INFILTRATION; PERINEURAL at 12:32

## 2017-06-01 RX ADMIN — LIDOCAINE HYDROCHLORIDE 20 ML: 10 INJECTION, SOLUTION INFILTRATION; PERINEURAL at 12:32

## 2017-06-01 RX ADMIN — LIDOCAINE HYDROCHLORIDE 40 MG: 20 INJECTION, SOLUTION EPIDURAL; INFILTRATION; INTRACAUDAL; PERINEURAL at 13:11

## 2017-06-01 RX ADMIN — MIDAZOLAM HYDROCHLORIDE 2 MG: 1 INJECTION, SOLUTION INTRAMUSCULAR; INTRAVENOUS at 12:26

## 2017-06-01 RX ADMIN — HEPARIN SODIUM 1000 UNITS: 200 INJECTION, SOLUTION INTRAVENOUS at 12:21

## 2017-06-01 RX ADMIN — PROPOFOL 80 MG: 10 INJECTION, EMULSION INTRAVENOUS at 13:11

## 2017-06-01 RX ADMIN — MIDAZOLAM HYDROCHLORIDE 1 MG: 1 INJECTION, SOLUTION INTRAMUSCULAR; INTRAVENOUS at 12:34

## 2017-06-01 RX ADMIN — FENTANYL CITRATE 25 MCG: 50 INJECTION, SOLUTION INTRAMUSCULAR; INTRAVENOUS at 12:45

## 2017-06-01 RX ADMIN — DOBUTAMINE IN DEXTROSE 10 MCG/KG/MIN: 200 INJECTION, SOLUTION INTRAVENOUS at 12:44

## 2017-06-01 RX ADMIN — FENTANYL CITRATE 50 MCG: 50 INJECTION, SOLUTION INTRAMUSCULAR; INTRAVENOUS at 12:52

## 2017-06-01 RX ADMIN — MIDAZOLAM HYDROCHLORIDE 1 MG: 1 INJECTION, SOLUTION INTRAMUSCULAR; INTRAVENOUS at 12:30

## 2017-06-01 NOTE — IP AVS SNAPSHOT
[address] [facility] 
[phone] Patient: Wu River 
MRN: [id number] VWR:2/3/3011 You are allergic to the following Allergen Reactions Latex Rash Adhesive Tape-Silicones Rash Erythromycin Rash Neomycin Rash Nickel Rash  
    
 Nsaids (Non-Steroidal Anti-Inflammatory Drug) Other (comments) Bleeding ulcers Recent Documentation Height Weight Breastfeeding? BMI OB Status Smoking Status 1.6 m 66.2 kg No 25.86 kg/m2 Hysterectomy Never Smoker Emergency Contacts Name Discharge Info Relation Home Work Mobile [address] CAREGIVER [3] Spouse [3]   [phone] About your hospitalization You were admitted on:  June 1, 2017 You last received care in the:  [facility] You were discharged on:  June 1, 2017 Unit phone number:  [phone] Why you were hospitalized Your primary diagnosis was:  Not on File Providers Seen During Your Hospitalizations Provider Role Specialty Primary office phone Rox Cates MD Attending Provider Cardiology [phone] Your Primary Care Physician (PCP) Primary Care Physician Office Phone Office Fax Gene Patton [phone] Follow-up Information Follow up With Details Comments Contact Info Dot Orellana MD   [facility] [address] [facility] 
[phone] Current Discharge Medication List  
  
START taking these medications Dose & Instructions Dispensing Information Comments Morning Noon Evening Bedtime  
 dabigatran etexilate 150 mg capsule Commonly known as:  PRADAXA Your last dose was: Your next dose is:    
   
   
 Dose:  150 mg Take 1 Cap by mouth two (2) times a day. Quantity:  60 Cap Refills:  2 CONTINUE these medications which have NOT CHANGED Dose & Instructions Dispensing Information Comments Morning Noon Evening Bedtime ALPRAZolam 0.25 mg tablet Commonly known as:  Jarek Tan Your last dose was: Your next dose is: Take  by mouth as needed for Anxiety. Refills:  0  
     
   
   
   
  
 azelastine 0.05 % ophthalmic solution Commonly known as:  OPTIVAR Your last dose was: Your next dose is:    
   
   
 Administer  to both eyes two (2) times a day. Use in affected eye(s) Refills:  0  
     
   
   
   
  
 bromfenac 0.075 % Drop Your last dose was: Your next dose is:    
   
   
 Apply  to eye. Refills:  0  
     
   
   
   
  
 CALCIUM 600 WITH VITAMIN D3 600 mg(1,500mg) -400 unit Cap Generic drug:  Calcium-Cholecalciferol (D3) Your last dose was: Your next dose is:    
   
   
 Dose:  2 Cap Take 2 Caps by mouth daily. Refills:  0  
     
   
   
   
  
 fexofenadine 180 mg tablet Commonly known as:  Anurag Jeffries Your last dose was: Your next dose is:    
   
   
 Dose:  180 mg Take 180 mg by mouth. Refills:  0  
     
   
   
   
  
 multivitamin tablet Commonly known as:  ONE A DAY Your last dose was: Your next dose is:    
   
   
 Dose:  1 Tab Take 1 Tab by mouth daily. Refills:  0  
     
   
   
   
  
 prednisoLONE sodium phosphate 1 % ophthalmic solution Commonly known as:  INFLAMASE FORTE Your last dose was: Your next dose is:    
   
   
 Dose:  1 Drop Administer 1 Drop to both eyes four (4) times daily. Refills:  0 PREMARIN 0.625 mg/gram vaginal cream  
Generic drug:  conjugated estrogens Your last dose was: Your next dose is:    
   
   
 Dose:  0.5 g Insert 0.5 g into vagina daily. Refills:  0 RHINOCORT AQUA 32 mcg/actuation nasal spray Generic drug:  budesonide Your last dose was: Your next dose is:    
   
   
  Refills:  0  
     
   
   
   
  
 sertraline 50 mg tablet Commonly known as:  ZOLOFT Your last dose was: Your next dose is: Take  by mouth daily. Refills:  0 Where to Get Your Medications Information on where to get these meds will be given to you by the nurse or doctor. ! Ask your nurse or doctor about these medications  
  dabigatran etexilate 150 mg capsule Discharge Instructions POST-EP STUDY AND ABLATION DISCHARGE INSTRUCTIONS: 
 
You had an empiric right atrial flutter ablation on 6/1/2017 with Dr. Pina Chapman. In addition to a propensity for atrial flutter, you showed one for atrial fibrillation as well. For this reason, you should be on full anticoagulation (rather than aspirin) for better protection against thromboembolism, particularly stroke. Start Pradaxa 150 mg twice daily with meals on 6/2. There is a discount card that is available online, this may apply to you, so look into it. Do not drive, operate any machinery, or sign any legal documents for 24 hours after your procedure. You must have someone to drive you home. You may take a shower 24 hours after your cardiac procedure. Be sure to get the dressing wet and then remove it; gently wash the area with warm soapy water. Pat dry and leave open to air. To help prevent infections, be sure to keep the cath site clean and dry. No lotions, creams, powders, ointments, etc. in the cath site for approximately 1 week. ? Do not take a tub bath, get in a hot tub or swimming pool for approximately 5 days or until the cath site is completely healed. ? No strenuous activity or heavy lifting over 20 lbs. for 7 days. ? After your procedure, some bruising or discomfort is common during the healing process.   Tylenol, 1-2 tablets every 6 hours as needed, is recommended if you experience any discomfort. If you experience any signs or symptoms of infection such as fever, chills, or poorly healing incision, persistent tenderness or swelling in the groin, redness and/or warmth to the touch, numbness, significant tingling or pain at the groin site or affected extremity, rash, drainage from the site, or if the leg feels tight or swollen, call your physician right away. ? If bleeding at the site occurs, take a clean gauze pad and apply direct pressure to the groin just above the puncture site, and call your physician right away. ? If your procedure involved ablation therapy, you may feel some mild or vague chest discomfort due to delivery of heat therapy to the heart muscle. This should resolve in 1-2 days. If it gets worse or is associated with shortness of breath, dizziness, loss of consciousness, call your physician right away or call 911 if emergency medical care is needed. Discharge Orders None Introducing \[facility]\"" & [facility] SERVICES! Dear Mykel Jorge: Thank you for requesting a [employer] account. Our records indicate that you already have an active [employer] account. You can access your account anytime at [email or web address] Did you know that you can access your hospital and ER discharge instructions at any time in [employer]? You can also review all of your test results from your hospital stay or ER visit. Additional Information If you have questions, please visit the Frequently Asked Questions section of the [employer] website at [email or web address] Remember, [employer] is NOT to be used for urgent needs. For medical emergencies, dial 911. Now available from your iPhone and Android! General Information Please provide this summary of care documentation to your next provider. Patient Signature:  ____________________________________________________________ Date:  ____________________________________________________________  
  
Palomo Mode Provider Signature:  ____________________________________________________________ Date:  ____________________________________________________________

## 2017-06-01 NOTE — PROCEDURES
[address]  [phone]    Patient ID:  Patient: Janae Rubio  MRN: [id number]  Age: 57 y.o.  : 1955  Gender: female  Study Date: 2017      History:  This is a female here with symptomatic paroxysmal atrial flutter that has a typical morphology on prior ECG. She is a candidate for electrophysiology study with cardiac ablation. Procedures Performed:   1. Comprehensive EP study with ablation of supraventricular mechanism (54980)   2. Left atrial pacing and recording from coronary sinus and left atrium (93323-70)   3. Intracardiac electrophysiologic 3-D mapping (54864)   4. Pacing and stimulation during drug infusion for arrhythmia induction (48764-69)       The patient was brought to EP lab in NPO state and after informed consent.  Continuous ECG and hemodynamic monitoring was performed.  Sedation was by the EP nurse who was in constant attendance and supervision initially, then by the anesthesiologist due to a higher sedation requirement. The right groin was anesthetized with 1% lidocaine and access obtained (2 safe sheaths in the right femoral vein). An 8Fr sheath on the right was changed to SR-0 long support sheath.  A deflectable duodecapolar catheter was advanced into the coronary sinus and left atrial pacing and recordings were obtained. A catheter was positioned in the low septal RA and RV apex during parts of the study.  An 8 Fr 8 mm tip, large curve, deflectable mapping and ablation catheter was used. A comprehensive EP study performed including both atrial and ventricular burst and extrastimulus pacing.  Dobutamine was used during part of the study to attempt induction prior to any ablation. The [employer] 3D mapping system was used. She was cardioverted early in the case with 200J external energy for induced atrial fibrillation.   Later in the case 200J was used for spontaneous atrial fibrillation. At the end of the case, all sheaths were removed, hemostasis obtained using manual pressure at the access sites. No immediate complications. Findings:  1.  At baseline, sinus rhythm with first degree AV block was noted (SCL 1001, , QRS 74, and  ms). The AH and HV intervals were 152 and 50 ms, respectively. 2.  Grossly, normal sinus node function without post-conversion pauses of significance. 3.  Antegrade conduction was midline, decremental without preexcitation. WBCL 470 ms. The fast and slow pathway ERP pacing at 600 ms was 440 ms and 270 ms. The AERP was 600/<=230 ms.   Though dual AV node physiology was noted including single echo beats, AVNRT was not observed or induced. 4.  Retrograde conduction was midline and decremental.  Retrograde WBCL 540 ms. The VAERP pacing at 600 ms was 400 ms and the VERP was 240 ms. 5.  Attempts to induce atrial flutter did result in an atrial flutter with concentric left atrial activation with cycle length 210 ms. This degenerated to atrial fibrillation and left atrial flutter. Cardioversion was performed to restore sinus rhythm for further testing. 6.  Using 3D mapping to facilitate ablation therapy, a 8Fr 8mm tip mapping and ablation catheter was used to ablate along the cavotricuspid isthmus in the 6-7 o'clock position using both temp and power controlled RFA therapy. Bidirectional block was demonstrated in sinus rhythm. 7.  She returned spontaneously to atrial fibrillation, so cardioversion was performed again sucessfuly. Impression:  1. Successful empiric ablation for typical right atrial flutter, bidirectional block was demonstrated across the CTI. 2.  Inducible atrial fibrillation and atypical atrial flutter. Spontaneous atrial fibrillation was also seen. 3.  Normal sinus node function. 4.  Normal antegrade conduction with dual AV node physiology. Single echo beats, but no AVNRT was induced.   5.  Normal retrograde conduction. Preoperative Diagnosis: As above. Postoperative Diagnosis: As above. Procedure:  As above. Surgeon(s) and Role:  Danielle Vale MD - Primary   Anesthesia:   MAC. Estimated Blood Loss:  <5 cc. Specimens: * No specimens in log *   Findings:  As below. Complications:  None. X-ray time:  3.4 minutes  Ablation time:  6.05 minutes from 4 treatments      RECS:  1. Add chronic anticoagulation.         Signed:  Danielle Vale MD

## 2017-06-01 NOTE — ANESTHESIA PREPROCEDURE EVALUATION
Anesthetic History   No history of anesthetic complications  PONV          Review of Systems / Medical History  Patient summary reviewed, nursing notes reviewed and pertinent labs reviewed    Pulmonary  Within defined limits                 Neuro/Psych   Within defined limits           Cardiovascular  Within defined limits          Dysrhythmias       Exercise tolerance: >4 METS     GI/Hepatic/Renal  Within defined limits   GERD           Endo/Other  Within defined limits      Arthritis     Other Findings              Physical Exam    Airway  Mallampati: II  TM Distance: 4 - 6 cm  Neck ROM: normal range of motion   Mouth opening: Normal     Cardiovascular  Regular rate and rhythm,  S1 and S2 normal,  no murmur, click, rub, or gallop             Dental  No notable dental hx       Pulmonary  Breath sounds clear to auscultation               Abdominal  GI exam deferred       Other Findings            Anesthetic Plan    ASA: 3  Anesthesia type: MAC

## 2017-06-01 NOTE — ANESTHESIA POSTPROCEDURE EVALUATION
Post-Anesthesia Evaluation and Assessment    Patient: Kathya Guzman MRN: [id number]  SSN: [id number]    YOB: 1955  Age: 57 y.o. Sex: female       Cardiovascular Function/Vital Signs  Visit Vitals    /70 (BP 1 Location: Right arm, BP Patient Position: At rest)    Pulse 72    Temp 36.5 °C (97.7 °F)    Resp 16    Ht 5' 3\" (1.6 m)    Wt 66.2 kg (146 lb)    SpO2 100%    Breastfeeding No    BMI 25.86 kg/m2       Patient is status post general anesthesia for * No procedures listed *. Nausea/Vomiting: None    Postoperative hydration reviewed and adequate. Pain:  Pain Scale 1: Numeric (0 - 10) (06/01/17 1108)  Pain Intensity 1: 0 (06/01/17 1108)   Managed    Neurological Status: At baseline    Mental Status and Level of Consciousness: Arousable    Pulmonary Status:   O2 Device: Nasal cannula (06/01/17 1325)   Adequate oxygenation and airway patent    Complications related to anesthesia: None    Post-anesthesia assessment completed.  No concerns    Signed By: Clarissa Hill MD     June 1, 2017

## 2017-06-01 NOTE — PROGRESS NOTES
Pt and  given d/c instructions, RX and education on blood thinners. TP and  had no questions. D/c'd IV and telemetry.

## 2017-06-01 NOTE — DISCHARGE INSTRUCTIONS
POST-EP STUDY AND ABLATION DISCHARGE INSTRUCTIONS:    You had an empiric right atrial flutter ablation on 6/1/2017 with Dr. Kelsey Hall. In addition to a propensity for atrial flutter, you showed one for atrial fibrillation as well. For this reason, you should be on full anticoagulation (rather than aspirin) for better protection against thromboembolism, particularly stroke. Start Pradaxa 150 mg twice daily with meals on 6/2. There is a discount card that is available online, this may apply to you, so look into it. Do not drive, operate any machinery, or sign any legal documents for 24 hours after your procedure. You must have someone to drive you home. You may take a shower 24 hours after your cardiac procedure. Be sure to get the dressing wet and then remove it; gently wash the area with warm soapy water. Pat dry and leave open to air. To help prevent infections, be sure to keep the cath site clean and dry. No lotions, creams, powders, ointments, etc. in the cath site for approximately 1 week.  Do not take a tub bath, get in a hot tub or swimming pool for approximately 5 days or until the cath site is completely healed.  No strenuous activity or heavy lifting over 20 lbs. for 7 days.  After your procedure, some bruising or discomfort is common during the healing process. Tylenol, 1-2 tablets every 6 hours as needed, is recommended if you experience any discomfort. If you experience any signs or symptoms of infection such as fever, chills, or poorly healing incision, persistent tenderness or swelling in the groin, redness and/or warmth to the touch, numbness, significant tingling or pain at the groin site or affected extremity, rash, drainage from the site, or if the leg feels tight or swollen, call your physician right away.      If bleeding at the site occurs, take a clean gauze pad and apply direct pressure to the groin just above the puncture site, and call your physician right away.     If your procedure involved ablation therapy, you may feel some mild or vague chest discomfort due to delivery of heat therapy to the heart muscle. This should resolve in 1-2 days. If it gets worse or is associated with shortness of breath, dizziness, loss of consciousness, call your physician right away or call 911 if emergency medical care is needed.

## 2017-06-03 ENCOUNTER — HOSPITAL ENCOUNTER (EMERGENCY)
Age: 62
Discharge: HOME | End: 2017-06-03
Attending: FAMILY MEDICINE

## 2017-06-03 VITALS
BODY MASS INDEX: 25.87 KG/M2 | SYSTOLIC BLOOD PRESSURE: 132 MMHG | HEART RATE: 65 BPM | OXYGEN SATURATION: 97 % | DIASTOLIC BLOOD PRESSURE: 73 MMHG | HEIGHT: 63 IN | TEMPERATURE: 97.4 F | WEIGHT: 146 LBS | RESPIRATION RATE: 14 BRPM

## 2017-06-03 DIAGNOSIS — J01.00 ACUTE NON-RECURRENT MAXILLARY SINUSITIS: Primary | ICD-10-CM

## 2017-06-03 RX ORDER — AZITHROMYCIN 250 MG/1
TABLET, FILM COATED ORAL
Qty: 6 TAB | Refills: 0 | Status: ON HOLD | OUTPATIENT
Start: 2017-06-03 | End: 2017-08-31

## 2017-06-03 RX ORDER — PREDNISONE 20 MG/1
60 TABLET ORAL DAILY
Qty: 15 TAB | Refills: 0 | Status: SHIPPED | OUTPATIENT
Start: 2017-06-03 | End: 2017-06-08

## 2017-06-03 RX ORDER — AZELASTINE HCL 205.5 UG/1
SPRAY NASAL 2 TIMES DAILY
COMMUNITY
End: 2019-06-29

## 2017-06-03 NOTE — DISCHARGE INSTRUCTIONS
Saline Nasal Washes: Care Instructions  Your Care Instructions  Saline nasal washes help keep the nasal passages open by washing out thick or dried mucus. This simple remedy can help relieve symptoms of allergies, sinusitis, and colds. It also can make the nose feel more comfortable by keeping the mucous membranes moist. You may notice a little burning sensation in your nose the first few times you use the solution, but this usually gets better in a few days. Follow-up care is a key part of your treatment and safety. Be sure to make and go to all appointments, and call your doctor if you are having problems. It's also a good idea to know your test results and keep a list of the medicines you take. How can you care for yourself at home? · You can buy premixed saline solution in a squeeze bottle or other sinus rinse products at a drugstore. Read and follow the instructions on the label. · You also can make your own saline solution by adding 1 teaspoon of salt and 1 teaspoon of baking soda to 2 cups of distilled water. · If you use a homemade solution, pour a small amount into a clean bowl. Using a rubber bulb syringe, squeeze the syringe and place the tip in the salt water. Pull a small amount of the salt water into the syringe by relaxing your hand. · Sit down with your head tilted slightly back. Do not lie down. Put the tip of the bulb syringe or the squeeze bottle a little way into one of your nostrils. Gently drip or squirt a few drops into the nostril. Repeat with the other nostril. Some sneezing and gagging are normal at first.  · Gently blow your nose. · Wipe the syringe or bottle tip clean after each use. · Repeat this 2 or 3 times a day. · Use nasal washes gently if you have nosebleeds often. When should you call for help? Watch closely for changes in your health, and be sure to contact your doctor if:  · You often get nosebleeds. · You have problems doing the nasal washes.   Where can you learn more? Go to http://mo-miguel.info/. Enter [id number] [id number] in the search box to learn more about \"Saline Nasal Washes: Care Instructions. \"  Current as of: July 29, 2016  Content Version: 11.2  © 2347-9450 [employer]. Care instructions adapted under license by [employer] (which disclaims liability or warranty for this information). If you have questions about a medical condition or this instruction, always ask your healthcare professional. [address] any warranty or liability for your use of this information. Sinusitis: Care Instructions  Your Care Instructions    Sinusitis is an infection of the lining of the sinus cavities in your head. Sinusitis often follows a cold. It causes pain and pressure in your head and face. In most cases, sinusitis gets better on its own in 1 to 2 weeks. But some mild symptoms may last for several weeks. Sometimes antibiotics are needed. Follow-up care is a key part of your treatment and safety. Be sure to make and go to all appointments, and call your doctor if you are having problems. It's also a good idea to know your test results and keep a list of the medicines you take. How can you care for yourself at home? · Take an over-the-counter pain medicine, such as acetaminophen (Tylenol), ibuprofen (Advil, Motrin), or naproxen (Aleve). Read and follow all instructions on the label. · If the doctor prescribed antibiotics, take them as directed. Do not stop taking them just because you feel better. You need to take the full course of antibiotics. · Be careful when taking over-the-counter cold or flu medicines and Tylenol at the same time. Many of these medicines have acetaminophen, which is Tylenol. Read the labels to make sure that you are not taking more than the recommended dose. Too much acetaminophen (Tylenol) can be harmful.   · Breathe warm, moist air from a steamy shower, a hot bath, or a sink filled with hot water. Avoid cold, dry air. Using a humidifier in your home may help. Follow the directions for cleaning the machine. · Use saline (saltwater) nasal washes to help keep your nasal passages open and wash out mucus and bacteria. You can buy saline nose drops at a grocery store or drugstore. Or you can make your own at home by adding 1 teaspoon of salt and 1 teaspoon of baking soda to 2 cups of distilled water. If you make your own, fill a bulb syringe with the solution, insert the tip into your nostril, and squeeze gently. Carol Bienenstock your nose. · Put a hot, wet towel or a warm gel pack on your face 3 or 4 times a day for 5 to 10 minutes each time. · Try a decongestant nasal spray like oxymetazoline (Afrin). Do not use it for more than 3 days in a row. Using it for more than 3 days can make your congestion worse. When should you call for help? Call your doctor now or seek immediate medical care if:  · You have new or worse swelling or redness in your face or around your eyes. · You have a new or higher fever. Watch closely for changes in your health, and be sure to contact your doctor if:  · You have new or worse facial pain. · The mucus from your nose becomes thicker (like pus) or has new blood in it. · You are not getting better as expected. Where can you learn more? Go to http://mo-miguel.info/. Enter S886 in the search box to learn more about \"Sinusitis: Care Instructions. \"  Current as of: July 29, 2016  Content Version: 11.2  © 1976-3973 [employer]. Care instructions adapted under license by [employer] (which disclaims liability or warranty for this information). If you have questions about a medical condition or this instruction, always ask your healthcare professional. [address] any warranty or liability for your use of this information.

## 2017-06-03 NOTE — UC PROVIDER NOTE
Patient is a 57 y.o. female presenting with sinus problems. The history is provided by the patient. No  was used. Sinus Infection    This is a new problem. Episode onset: 5 days. The problem has been gradually worsening. There has been no fever. The pain is at a severity of 2/10. The pain is mild. The pain has been constant since onset. Associated symptoms include congestion and sinus pressure. Pertinent negatives include no cough, no shortness of breath and no rhinorrhea. Associated symptoms comments: Reports copious green nasal drainage with pain above right eye. . She has tried nasal steroids for the symptoms. The treatment provided no relief. Past Medical History:   Diagnosis Date    Depression     GERD (gastroesophageal reflux disease)     H/O hammer toe correction     Nausea & vomiting     Osteoarthritis     Psychiatric disorder     anxiety, seasonal affective disorder    PUD (peptic ulcer disease)     SV[facility]ra[facility]ricular tachycardia[facility])         Past Surgical History:   Procedure Laterality Date    HX BREAST BIOPSY Right     40 years ago    HX CATARACT REMOVAL      bilateral    HX GYN      HX HEENT      L pupil does not react; h/o bilateral retina surgery    HX HYSTERECTOMY      HX ORTHOPAEDIC      left thumb surgery    HX ORTHOPAEDIC      right hammer toe repair with screw    HX RETINAL DETACHMENT REPAIR      bilateral     [place] EGD TRANSORAL CONTROL BLEEDING ANY METHOD  10/2/2013              Family History   Problem Relation Age of Onset    Heart Disease Mother     Kidney Disease Mother     Heart Disease Father     Hypertension Father         Social History     Social History    Marital status:      Spouse name: N/A    Number of children: N/A    Years of education: N/A     Occupational History    Not on file.      Social History Main Topics    Smoking status: Never Smoker    Smokeless tobacco: Never Used    Alcohol use 0.0 oz/week Comment: 5 per week    Drug use: Yes     Special: Prescription    Sexual activity: Yes     Partners: Male     Birth control/ protection: Surgical     Other Topics Concern    Not on file     Social History Narrative                ALLERGIES: Latex; Adhesive tape-silicones; Erythromycin; Neomycin; Nickel; and Nsaids (non-steroidal anti-inflammatory drug)    Review of Systems   Constitutional: Negative. HENT: Positive for congestion and sinus pressure. Negative for rhinorrhea. Eyes: Negative. Respiratory: Negative. Negative for cough and shortness of breath. Cardiovascular: Negative. Gastrointestinal: Negative. Endocrine: Negative. Genitourinary: Negative. Musculoskeletal: Negative. Skin: Negative. Allergic/Immunologic: Negative. Neurological: Negative. Hematological: Negative. Psychiatric/Behavioral: Negative. Vitals:    06/03/17 1144   BP: 132/73   Pulse: 65   Resp: 14   Temp: 97.4 °F (36.3 °C)   SpO2: 97%   Weight: 66.2 kg (146 lb)   Height: 5' 3\" (1.6 m)       Physical Exam   Constitutional: She is oriented to person, place, and time. She appears well-developed and well-nourished. HENT:   Head: Normocephalic and atraumatic. Right Ear: External ear normal.   Left Ear: External ear normal.   Nose: Nose normal.   Mouth/Throat: Oropharynx is clear and moist. No oropharyngeal exudate. + tenderness to right frontal sinus and right maxillary sinus tenderness with palpation. Eyes: Conjunctivae and EOM are normal. Pupils are equal, round, and reactive to light. Neck: Normal range of motion. Neck supple. Cardiovascular: Normal rate, regular rhythm and normal heart sounds. Pulmonary/Chest: Effort normal and breath sounds normal.   Musculoskeletal: Normal range of motion. Lymphadenopathy:     She has no cervical adenopathy. Neurological: She is alert and oriented to person, place, and time. Skin: Skin is warm and dry.    Psychiatric: She has a normal mood and affect. Her behavior is normal. Judgment and thought content normal.   Nursing note and vitals reviewed. MDM     Differential Diagnosis; Clinical Impression; Plan:     CLINICAL IMPRESSION:  Acute non-recurrent maxillary sinusitis  (primary encounter diagnosis)    Plan: 1. Sinusitis  2. Zpack/Prednisone as directed  3. Follow up with PCP as needed  Risk of Significant Complications, Morbidity, and/or Mortality:   Presenting problems: Moderate  Diagnostic procedures:   Moderate  Progress:   Patient progress:  Stable      Procedures

## 2017-07-15 ENCOUNTER — HOSPITAL ENCOUNTER (EMERGENCY)
Age: 62
Discharge: HOME | End: 2017-07-15
Attending: EMERGENCY MEDICINE
Payer: COMMERCIAL

## 2017-07-15 VITALS
BODY MASS INDEX: 25.87 KG/M2 | DIASTOLIC BLOOD PRESSURE: 72 MMHG | HEART RATE: 79 BPM | TEMPERATURE: 97.6 F | WEIGHT: 146 LBS | RESPIRATION RATE: 21 BRPM | HEIGHT: 63 IN | OXYGEN SATURATION: 93 % | SYSTOLIC BLOOD PRESSURE: 127 MMHG

## 2017-07-15 DIAGNOSIS — I48.91 ATRIAL FIBRILLATION AND FLUTTER (HCC): Primary | ICD-10-CM

## 2017-07-15 DIAGNOSIS — I48.92 ATRIAL FIBRILLATION AND FLUTTER (HCC): Primary | ICD-10-CM

## 2017-07-15 LAB
ALBUMIN SERPL BCP-MCNC: 3.9 G/DL (ref 3.5–5)
ALBUMIN/GLOB SERPL: 1.1 {RATIO} (ref 1.1–2.2)
ALP SERPL-CCNC: 84 U/L (ref 45–117)
ALT SERPL-CCNC: 23 U/L (ref 12–78)
ANION GAP BLD CALC-SCNC: 9 MMOL/L (ref 5–15)
AST SERPL W P-5'-P-CCNC: 19 U/L (ref 15–37)
BASOPHILS # BLD AUTO: 0 K/UL (ref 0–0.1)
BASOPHILS # BLD: 0 % (ref 0–1)
BILIRUB SERPL-MCNC: 0.3 MG/DL (ref 0.2–1)
BUN SERPL-MCNC: 11 MG/DL (ref 6–20)
BUN/CREAT SERPL: 13 (ref 12–20)
CALCIUM SERPL-MCNC: 9.2 MG/DL (ref 8.5–10.1)
CHLORIDE SERPL-SCNC: 105 MMOL/L (ref 97–108)
CO2 SERPL-SCNC: 26 MMOL/L (ref 21–32)
CREAT SERPL-MCNC: 0.87 MG/DL (ref 0.55–1.02)
EOSINOPHIL # BLD: 0.1 K/UL (ref 0–0.4)
EOSINOPHIL NFR BLD: 2 % (ref 0–7)
ERYTHROCYTE [DISTWIDTH] IN BLOOD BY AUTOMATED COUNT: 13.8 % (ref 11.5–14.5)
GLOBULIN SER CALC-MCNC: 3.4 G/DL (ref 2–4)
GLUCOSE SERPL-MCNC: 126 MG/DL (ref 65–100)
HCT VFR BLD AUTO: 38.9 % (ref 35–47)
HGB BLD-MCNC: 13.3 G/DL (ref 11.5–16)
LYMPHOCYTES # BLD AUTO: 22 % (ref 12–49)
LYMPHOCYTES # BLD: 2 K/UL (ref 0.8–3.5)
MAGNESIUM SERPL-MCNC: 2 MG/DL (ref 1.6–2.4)
MCH RBC QN AUTO: 31.4 PG (ref 26–34)
MCHC RBC AUTO-ENTMCNC: 34.2 G/DL (ref 30–36.5)
MCV RBC AUTO: 91.7 FL (ref 80–99)
MONOCYTES # BLD: 0.9 K/UL (ref 0–1)
MONOCYTES NFR BLD AUTO: 10 % (ref 5–13)
NEUTS SEG # BLD: 5.9 K/UL (ref 1.8–8)
NEUTS SEG NFR BLD AUTO: 66 % (ref 32–75)
PLATELET # BLD AUTO: 303 K/UL (ref 150–400)
POTASSIUM SERPL-SCNC: 3.4 MMOL/L (ref 3.5–5.1)
PROT SERPL-MCNC: 7.3 G/DL (ref 6.4–8.2)
RBC # BLD AUTO: 4.24 M/UL (ref 3.8–5.2)
SODIUM SERPL-SCNC: 140 MMOL/L (ref 136–145)
TROPONIN I SERPL-MCNC: <0.04 NG/ML
WBC # BLD AUTO: 8.8 K/UL (ref 3.6–11)

## 2017-07-15 PROCEDURE — 99285 EMERGENCY DEPT VISIT HI MDM: CPT

## 2017-07-15 PROCEDURE — 80053 COMPREHEN METABOLIC PANEL: CPT | Performed by: EMERGENCY MEDICINE

## 2017-07-15 PROCEDURE — 84484 ASSAY OF TROPONIN QUANT: CPT | Performed by: EMERGENCY MEDICINE

## 2017-07-15 PROCEDURE — 36415 COLL VENOUS BLD VENIPUNCTURE: CPT | Performed by: EMERGENCY MEDICINE

## 2017-07-15 PROCEDURE — 74011250637 HC RX REV CODE- 250/637: Performed by: EMERGENCY MEDICINE

## 2017-07-15 PROCEDURE — 93005 ELECTROCARDIOGRAM TRACING: CPT

## 2017-07-15 PROCEDURE — 83735 ASSAY OF MAGNESIUM: CPT | Performed by: EMERGENCY MEDICINE

## 2017-07-15 PROCEDURE — 85025 COMPLETE CBC W/AUTO DIFF WBC: CPT | Performed by: EMERGENCY MEDICINE

## 2017-07-15 RX ORDER — SODIUM CHLORIDE 0.9 % (FLUSH) 0.9 %
5-10 SYRINGE (ML) INJECTION AS NEEDED
Status: DISCONTINUED | OUTPATIENT
Start: 2017-07-15 | End: 2017-07-15 | Stop reason: HOSPADM

## 2017-07-15 RX ORDER — DILTIAZEM HYDROCHLORIDE 180 MG/1
180 CAPSULE, COATED, EXTENDED RELEASE ORAL DAILY
Qty: 30 CAP | Refills: 0 | Status: SHIPPED | OUTPATIENT
Start: 2017-07-15 | End: 2017-09-01

## 2017-07-15 RX ORDER — DILTIAZEM HYDROCHLORIDE 180 MG/1
180 CAPSULE, COATED, EXTENDED RELEASE ORAL
Status: COMPLETED | OUTPATIENT
Start: 2017-07-15 | End: 2017-07-15

## 2017-07-15 RX ORDER — SODIUM CHLORIDE 0.9 % (FLUSH) 0.9 %
5-10 SYRINGE (ML) INJECTION EVERY 8 HOURS
Status: DISCONTINUED | OUTPATIENT
Start: 2017-07-15 | End: 2017-07-15 | Stop reason: HOSPADM

## 2017-07-15 RX ADMIN — DILTIAZEM HYDROCHLORIDE 180 MG: 180 CAPSULE, COATED, EXTENDED RELEASE ORAL at 16:41

## 2017-07-15 NOTE — ED PROVIDER NOTES
HPI Comments: Jos Benitez is a 57 y.o. female with PMhx significant for SVT and anxiety who presents ambulatory to the ED with c/o constant, worsening palpations and mild dizziness which started at 1:55 PM. Pt states she had just finished lunch and when she began to develop the symptoms. She sat down to relax, but had no alleviation of sxs. Of note, pt had a cardiac ablation for A-flutter on 6/1/2017 and she was cardioverted multiple times during the procedure because she was intermittently going into A-fib. She states her cardiologist placed her on pradaxa after the procedure and she is scheduled to follow-up on 8/2/2017. She denies any recent long distance travel, thyroid problems, and noted a decrease in her caffeine intake. Pt specifically denies any CP. PCP: Emma Jj MD  Cardiologist: Dr. Lucita Anderson history significant for: - Tobacco, - EtOH, - Illicit Drug Use    There are no other complaints, changes, or physical findings at this time. The history is provided by the patient. No  was used.         Past Medical History:   Diagnosis Date    Depression     GERD (gastroesophageal reflux disease)     H/O hammer toe correction     Nausea & vomiting     Osteoarthritis     Psychiatric disorder     anxiety, seasonal affective disorder    PUD (peptic ulcer disease)     SVT [facility]ra[facility]ricular tachycardia) ([facility])        Past Surgical History:   Procedure Laterality Date    HX BREAST BIOPSY Right     40 years ago    HX CATARACT REMOVAL      bilateral    HX GYN      HX HEENT      L pupil does not react; h/o bilateral retina surgery    HX HYSTERECTOMY      HX ORTHOPAEDIC      left thumb surgery    HX ORTHOPAEDIC      right hammer toe repair with screw    HX RETINAL DETACHMENT REPAIR      bilateral     TN EGD TRANSORAL CONTROL BLEEDING ANY METHOD  10/2/2013              Family History:   Problem Relation Age of Onset    Heart Disease Mother     Kidney Disease Mother     Heart Disease Father     Hypertension Father        Social History     Social History    Marital status:      Spouse name: N/A    Number of children: N/A    Years of education: N/A     Occupational History    Not on file. Social History Main Topics    Smoking status: Never Smoker    Smokeless tobacco: Never Used    Alcohol use 0.0 oz/week      Comment: 5 per week    Drug use: Yes     Special: Prescription    Sexual activity: Yes     Partners: Male     Birth control/ protection: Surgical     Other Topics Concern    Not on file     Social History Narrative         ALLERGIES: Latex; Adhesive tape-silicones; Erythromycin; Neomycin; Nickel; and Nsaids (non-steroidal anti-inflammatory drug)    Review of Systems   Constitutional: Negative. Negative for chills and fever. HENT: Negative. Negative for congestion and rhinorrhea. Respiratory: Negative. Negative for cough, chest tightness and wheezing. Cardiovascular: Positive for palpitations. Negative for chest pain. Gastrointestinal: Negative. Negative for abdominal pain, constipation, nausea and vomiting. Endocrine: Negative. Genitourinary: Negative. Negative for decreased urine volume, flank pain, hematuria and pelvic pain. Musculoskeletal: Negative. Negative for back pain and neck pain. Skin: Negative. Negative for color change, pallor and rash. Neurological: Positive for dizziness. Negative for seizures, weakness, numbness and headaches. Hematological: Negative. Negative for adenopathy. Psychiatric/Behavioral: Negative. All other systems reviewed and are negative. Vitals:    07/15/17 1504   BP: 133/90   Pulse: 96   Resp: 16   Temp: 97.6 °F (36.4 °C)   SpO2: 99%   Weight: 66.2 kg (146 lb)   Height: 5' 3\" (1.6 m)            Physical Exam   Constitutional: She is oriented to person, place, and time. She appears well-developed and well-nourished. No distress.    HENT:   Head: Normocephalic and atraumatic. Mouth/Throat: No oropharyngeal exudate. Eyes: Conjunctivae are normal. Pupils are equal, round, and reactive to light. Right eye exhibits no discharge. Left eye exhibits no discharge. No scleral icterus. Neck: Normal range of motion. Neck supple. No JVD present. Cardiovascular: Normal rate, normal heart sounds and intact distal pulses. An irregular rhythm present. Exam reveals no gallop and no friction rub. No murmur heard. Pulmonary/Chest: Effort normal and breath sounds normal. No stridor. No respiratory distress. She has no wheezes. She has no rales. She exhibits no tenderness. Abdominal: Soft. Bowel sounds are normal. She exhibits no distension and no mass. There is no tenderness. There is no rebound and no guarding. Neurological: She is alert and oriented to person, place, and time. She displays normal reflexes. No cranial nerve deficit. She exhibits normal muscle tone. Coordination normal.   Skin: Skin is warm. No rash noted. She is not diaphoretic. No pallor. Nursing note and vitals reviewed. MDM  Number of Diagnoses or Management Options  Atrial fibrillation and flutter [facility]):   Diagnosis management comments: DDx: A-fib, electrolyte abnormality, coronary syndrome, thyroid dysfunction    Impression/Plan: hx of atrial flutter and ablation on 06/01/17 by Dr. Amadou Cummings. During that episode did have intermittent a-fib and was placed on Pradaxa. Here with acute episode of irregular heart beat and vague dizziness earlier today. She is usually in NSR. Will check labs and discuss her case with cardiology.        Amount and/or Complexity of Data Reviewed  Clinical lab tests: ordered and reviewed  Tests in the medicine section of CPT®: ordered and reviewed  Obtain history from someone other than the patient: yes  Review and summarize past medical records: yes  Discuss the patient with other providers: yes (Cardiology )  Independent visualization of images, tracings, or specimens: yes    Risk of Complications, Morbidity, and/or Mortality  Presenting problems: moderate  Diagnostic procedures: moderate  Management options: moderate    Patient Progress  Patient progress: stable    Procedures    EKG interpretation: (Preliminary)  3:09 PM  Rhythm: atrial fib; and irregular. Rate (approx.): 93 bpm; Axis: normal; QRS interval: normal ; ST/T wave: nonspecific ST abnormality; Other findings: incomplete RBBB. Written by Sylvia Vinson ED Scribe, as dictated by Christie Martinez MD     Consult Note:  3:30 PM  Christie Martinez MD spoke with Dr. Mary Merritt,  Specialty: Cardiology  Discussed pt's hx, disposition, and available diagnostic and imaging results. Reviewed care plans. Consultant agrees with plans as outlined. Dr. Mary Merritt will come see the pt and recommends giving Cardizem . Written by Peter Interiano ED Scribe, as dictated by Christie Martinez MD.    PROGRESS NOTE:   4:46 PM  Dr. Mary Merritt has evaluated the pt, recommends keeping her on Cardizem, and have her f/u with Dr. Nilsa Weems as scheduled.     LABORATORY TESTS:  Recent Results (from the past 12 hour(s))   EKG, 12 LEAD, INITIAL    Collection Time: 07/15/17  3:09 PM   Result Value Ref Range    Ventricular Rate 93 BPM    Atrial Rate 312 BPM    QRS Duration 100 ms    Q-T Interval 360 ms    QTC Calculation (Bezet) 447 ms    Calculated R Axis 49 degrees    Calculated T Axis 5 degrees    Diagnosis       Atrial fibrillation  Incomplete right bundle branch block  Nonspecific ST abnormality , probably digitalis effect  Abnormal ECG  When compared with ECG of 10-APR-2017 16:06,  Atrial fibrillation has replaced Sinus rhythm  Non-specific change in ST segment in Inferior leads  Nonspecific T wave abnormality now evident in Inferior leads     CBC WITH AUTOMATED DIFF    Collection Time: 07/15/17  3:32 PM   Result Value Ref Range    WBC 8.8 3.6 - 11.0 K/uL    RBC 4.24 3.80 - 5.20 M/uL    HGB 13.3 11.5 - 16.0 g/dL    HCT 38.9 35.0 - 47.0 % MCV 91.7 80.0 - 99.0 FL    MCH 31.4 26.0 - 34.0 PG    MCHC 34.2 30.0 - 36.5 g/dL    RDW 13.8 11.5 - 14.5 %    PLATELET 095 707 - 761 K/uL    NEUTROPHILS 66 32 - 75 %    LYMPHOCYTES 22 12 - 49 %    MONOCYTES 10 5 - 13 %    EOSINOPHILS 2 0 - 7 %    BASOPHILS 0 0 - 1 %    ABS. NEUTROPHILS 5.9 1.8 - 8.0 K/UL    ABS. LYMPHOCYTES 2.0 0.8 - 3.5 K/UL    ABS. MONOCYTES 0.9 0.0 - 1.0 K/UL    ABS. EOSINOPHILS 0.1 0.0 - 0.4 K/UL    ABS. BASOPHILS 0.0 0.0 - 0.1 K/UL   METABOLIC PANEL, COMPREHENSIVE    Collection Time: 07/15/17  3:32 PM   Result Value Ref Range    Sodium 140 136 - 145 mmol/L    Potassium 3.4 (L) 3.5 - 5.1 mmol/L    Chloride 105 97 - 108 mmol/L    CO2 26 21 - 32 mmol/L    Anion gap 9 5 - 15 mmol/L    Glucose 126 (H) 65 - 100 mg/dL    BUN 11 6 - 20 MG/DL    Creatinine 0.87 0.55 - 1.02 MG/DL    BUN/Creatinine ratio 13 12 - 20      GFR est AA >60 >60 ml/min/1.73m2    GFR est non-AA >60 >60 ml/min/1.73m2    Calcium 9.2 8.5 - 10.1 MG/DL    Bilirubin, total 0.3 0.2 - 1.0 MG/DL    ALT (SGPT) 23 12 - 78 U/L    AST (SGOT) 19 15 - 37 U/L    Alk. phosphatase 84 45 - 117 U/L    Protein, total 7.3 6.4 - 8.2 g/dL    Albumin 3.9 3.5 - 5.0 g/dL    Globulin 3.4 2.0 - 4.0 g/dL    A-G Ratio 1.1 1.1 - 2.2     MAGNESIUM    Collection Time: 07/15/17  3:32 PM   Result Value Ref Range    Magnesium 2.0 1.6 - 2.4 mg/dL   TROPONIN I    Collection Time: 07/15/17  3:32 PM   Result Value Ref Range    Troponin-I, Qt. <0.04 <0.05 ng/mL       MEDICATIONS GIVEN:  Medications   sodium chloride (NS) flush 5-10 mL ( IntraVENous Canceled Entry 7/15/17 1523)   sodium chloride (NS) flush 5-10 mL (not administered)   dilTIAZem CD (CARDIZEM CD) capsule 180 mg (180 mg Oral Given 7/15/17 1641)       IMPRESSION:  1. Atrial fibrillation and flutter ([facility]        PLAN:  1. Current Discharge Medication List      START taking these medications    Details   dilTIAZem CD (CARDIZEM CD) 180 mg ER capsule Take 1 Cap by mouth daily.   Qty: 30 Cap, Refills: 0 2.   Follow-up Information     Follow up With Details Comments Contact Info    Tristan Mack MD Schedule an appointment as soon as possible for a visit in 2 weeks  [address]  [address]  [address] [phone]      [facility] EMERGENCY DEPT  If symptoms worsen [address] [place] [address]  [phone]        Return to ED if worse     Discharge Note:  4:50 PM  The patient is ready for discharge. The patient's signs, symptoms, diagnosis, and discharge instruction have been discussed and the patient has conveyed their understanding. The patient is to follow up as recommended or return to the ER should their symptoms worsen. Plan has been discussed and the patient is in agreement. Attestation: This note is prepared by Hamida Avila, acting as Scribe for Indra Brown MD.    Indra Brown MD: The scribe's documentation has been prepared under my direction and personally reviewed by me in its entirety. I confirm that the note above accurately reflects all work, treatment, procedures, and medical decision making performed by me.

## 2017-07-15 NOTE — CONSULTS
[address]   [address] [facility]       Name:  Linda Montemayor   MR#:  [id number]   :  1955   Account #:  [de-identified]    Date of Consultation:  07/15/2017   Date of Adm:  07/15/2017       CARDIOLOGY CONSULTATION     REQUESTING: Dr. Jeb Walton. PRIMARY CARE: Delona Olden Colletta Kapur, MD.    PRIMARY CARDIOLOGIST: Nelia Faith MD.     She has a history of atrial flutter having undergone recent atrial flutter   ablation approximately a month ago. During the procedure she had a   couple of episodes of atrial fibrillation which were cardioverted, but she   maintained sinus rhythm subsequently. She has been on Pradaxa, but she has not been on any anti-arrhythmic   agent. Today she began feeling, as she described - unusual, and she notes   the time of onset at 5 minutes to 2 this afternoon. She is very particular   about all this. She could feel that her heart at times was beating   irregular and when she checked her pulse at times it was over 100. She came to the emergency room, she is in atrial fibrillation with a   moderate rate of about 80 to 90 beats per minute, hemodynamically   stable. Her lab work has all come back okay. CBC is normal. Her CMP is   normal. Troponin is normal. EKG shows atrial fibrillation with a   moderate response, incomplete right bundle branch block and   nonspecific ST-T changes. She has no other underlying heart disease. She had an echocardiogram done just in April, she has normal left   ventricular systolic function, ejection fraction is 50 to 65%. No   significant valvular abnormalities. She apparently has some sort of transient visual disturbance or retinal   occlusion with one of her episodes of atrial flutter previously. Home medications include:    1. Pradaxa 150 mg b.i.d.   2. Astepro. 3. Zithromax. 4. Premarin cream.   5. Eye drops. 6. Calcium   7. Zoloft 50 mg daily. 8. Xanax 0.25 mg as needed. 9. Multi-vitamin daily. ALLERGIES:    1. LATEX. 2. ADHESIVE TAPE. 3. ERYTHROMYCIN. 4. NEOMYCIN. 5. NICKEL. 6. NONSTEROIDAL DRUGS. SOCIAL HISTORY: Nonsmoker. FAMILY HISTORY: Negative for premature coronary artery disease. REVIEW OF SYSTEMS: As noted above. PHYSICAL EXAMINATION   GENERAL: She is a middle aged female, appearing stated age, no   acute distress. She is awake, alert and oriented. VITAL SIGNS: Afebrile, pulse 95, blood pressure 130/90, respirations   16, saturation 99%. HEENT: Atraumatic. SKIN: Warm and dry. LUNGS; Clear to auscultation. HEART: Irregularly irregular. No murmurs, rubs or gallops. ABDOMEN: Soft, nontender. EXTREMITIES: No cyanosis, clubbing or edema. NEURO: Exam is grossly normal, no focal motor deficits. ASSESSMENT    1. Paroxysmal atrial fibrillation with a moderate ventricular response. 2. Status post recent atrial flutter ablation. 3. Chronic anticoagulation with Pradaxa for the last month. RECOMMENDATIONS: I think that it would be fine for her to be   discharged home for outpatient followup, her rate is not uncontrolled. I   am going to go ahead and start her on a moderate dose of Cardizem   180 a day which will help with her rate control from being too fast. But I   think she should tolerate that. She has been on Pradaxa so we can continue her on Pradaxa as an   outpatient. She is scheduled to see Dr. Sierra Petersen on 08/03. I would have her to   continue, keep that same appointment.          Vineet Cuevas MD      [address] [facility] / Martha.[email or web address]   D:  07/15/2017   16:38   T:  07/15/2017   17:15   Job #:  [id number]

## 2017-07-15 NOTE — ED NOTES
Dr. Rubio Wakefield at bedside to provide discharge paperwork. Vital signs stable. Pt in no apparent distress at this time. Mental status at baseline. Ambulatory to waiting room with steady gate, discharge paperwork in hand. Accompanied by .

## 2017-07-15 NOTE — PROGRESS NOTES
A Fib with moderate rate. S/p A Flutter ablation. Labs OK    Will start her on Cardizem  180 mg and have her f/u with Dr Jeremy Glaser.

## 2017-07-16 LAB
ATRIAL RATE: 312 BPM
CALCULATED R AXIS, ECG10: 49 DEGREES
CALCULATED T AXIS, ECG11: 5 DEGREES
DIAGNOSIS, 93000: NORMAL
Q-T INTERVAL, ECG07: 360 MS
QRS DURATION, ECG06: 100 MS
QTC CALCULATION (BEZET), ECG08: 447 MS
VENTRICULAR RATE, ECG03: 93 BPM

## 2017-08-31 ENCOUNTER — HOSPITAL ENCOUNTER (OUTPATIENT)
Dept: NON INVASIVE DIAGNOSTICS | Age: 62
Discharge: HOME | End: 2017-09-01
Attending: INTERNAL MEDICINE | Admitting: INTERNAL MEDICINE
Payer: COMMERCIAL

## 2017-08-31 ENCOUNTER — ANESTHESIA (OUTPATIENT)
Dept: MEDSURG UNIT | Age: 62
End: 2017-08-31
Payer: COMMERCIAL

## 2017-08-31 ENCOUNTER — ANESTHESIA EVENT (OUTPATIENT)
Dept: MEDSURG UNIT | Age: 62
End: 2017-08-31
Payer: COMMERCIAL

## 2017-08-31 PROBLEM — Z98.890 S/P ABLATION OF ATRIAL FIBRILLATION: Status: ACTIVE | Noted: 2017-08-31

## 2017-08-31 PROBLEM — Z86.79 S/P ABLATION OF ATRIAL FIBRILLATION: Status: ACTIVE | Noted: 2017-08-31

## 2017-08-31 LAB
ACT BLD: 125 SECS (ref 79–138)
ACT BLD: 285 SECS (ref 79–138)
ACT BLD: 285 SECS (ref 79–138)
ACT BLD: 334 SECS (ref 79–138)

## 2017-08-31 PROCEDURE — C1893 INTRO/SHEATH, FIXED,NON-PEEL: HCPCS

## 2017-08-31 PROCEDURE — 74011250636 HC RX REV CODE- 250/636

## 2017-08-31 PROCEDURE — 85347 COAGULATION TIME ACTIVATED: CPT

## 2017-08-31 PROCEDURE — 77030029065 HC DRSG HEMO QCLOT ZMED -B

## 2017-08-31 PROCEDURE — C1894 INTRO/SHEATH, NON-LASER: HCPCS

## 2017-08-31 PROCEDURE — 74011250637 HC RX REV CODE- 250/637: Performed by: INTERNAL MEDICINE

## 2017-08-31 PROCEDURE — 77030030806 HC PTCH ENSIT NAVX STJU -G

## 2017-08-31 PROCEDURE — 76210000006 HC OR PH I REC 0.5 TO 1 HR

## 2017-08-31 PROCEDURE — 77010033678 HC OXYGEN DAILY

## 2017-08-31 PROCEDURE — 77030018729 HC ELECTRD DEFIB PAD CARD -B

## 2017-08-31 PROCEDURE — 77030034850

## 2017-08-31 PROCEDURE — 93325 DOPPLER ECHO COLOR FLOW MAPG: CPT

## 2017-08-31 PROCEDURE — 77030011640 HC PAD GRND REM COVD -A

## 2017-08-31 PROCEDURE — 77030033352 HC TBNG IRR PMP COOL PNT STJU -B

## 2017-08-31 PROCEDURE — C1730 CATH, EP, 19 OR FEW ELECT: HCPCS

## 2017-08-31 PROCEDURE — 77030029359 HC PRB ESOPH TEMP CATH ANTM -F

## 2017-08-31 PROCEDURE — C1759 CATH, INTRA ECHOCARDIOGRAPHY: HCPCS

## 2017-08-31 PROCEDURE — 77030003700 HC NDL TSEPTL STJU -C

## 2017-08-31 PROCEDURE — 74011250636 HC RX REV CODE- 250/636: Performed by: INTERNAL MEDICINE

## 2017-08-31 PROCEDURE — 77030010880 HC CBL EP SUPRME STJU -C

## 2017-08-31 PROCEDURE — 77030008771 HC TU NG SALEM SUMP -A: Performed by: ANESTHESIOLOGY

## 2017-08-31 PROCEDURE — 77030026438 HC STYL ET INTUB CARD -A: Performed by: ANESTHESIOLOGY

## 2017-08-31 PROCEDURE — C1766 INTRO/SHEATH,STRBLE,NON-PEEL: HCPCS

## 2017-08-31 PROCEDURE — 77030016898 HC CBL EP EXT3 STJU -B

## 2017-08-31 PROCEDURE — 77030008684 HC TU ET CUF COVD -B: Performed by: ANESTHESIOLOGY

## 2017-08-31 PROCEDURE — 77030019908 HC STETH ESOPH SIMS -A: Performed by: ANESTHESIOLOGY

## 2017-08-31 PROCEDURE — C1731 CATH, EP, 20 OR MORE ELEC: HCPCS

## 2017-08-31 PROCEDURE — 76060000037 HC ANESTHESIA 3 TO 3.5 HR

## 2017-08-31 PROCEDURE — 77030013797 HC KT TRNSDUC PRSSR EDWD -A

## 2017-08-31 PROCEDURE — 77030013079 HC BLNKT BAIR HGGR 3M -A: Performed by: ANESTHESIOLOGY

## 2017-08-31 PROCEDURE — C2630 CATH, EP, COOL-TIP: HCPCS

## 2017-08-31 PROCEDURE — 93613 INTRACARDIAC EPHYS 3D MAPG: CPT

## 2017-08-31 RX ORDER — ONDANSETRON 2 MG/ML
INJECTION INTRAMUSCULAR; INTRAVENOUS AS NEEDED
Status: DISCONTINUED | OUTPATIENT
Start: 2017-08-31 | End: 2017-08-31 | Stop reason: HOSPADM

## 2017-08-31 RX ORDER — FLECAINIDE ACETATE 100 MG/1
50 TABLET ORAL 2 TIMES DAILY
Status: DISCONTINUED | OUTPATIENT
Start: 2017-08-31 | End: 2017-09-01 | Stop reason: HOSPADM

## 2017-08-31 RX ORDER — DIPHENHYDRAMINE HYDROCHLORIDE 50 MG/ML
12.5 INJECTION, SOLUTION INTRAMUSCULAR; INTRAVENOUS AS NEEDED
Status: DISCONTINUED | OUTPATIENT
Start: 2017-08-31 | End: 2017-08-31 | Stop reason: HOSPADM

## 2017-08-31 RX ORDER — SODIUM CHLORIDE 9 MG/ML
100 INJECTION, SOLUTION INTRAVENOUS CONTINUOUS
Status: DISCONTINUED | OUTPATIENT
Start: 2017-08-31 | End: 2017-08-31

## 2017-08-31 RX ORDER — SODIUM CHLORIDE, SODIUM LACTATE, POTASSIUM CHLORIDE, CALCIUM CHLORIDE 600; 310; 30; 20 MG/100ML; MG/100ML; MG/100ML; MG/100ML
100 INJECTION, SOLUTION INTRAVENOUS CONTINUOUS
Status: DISCONTINUED | OUTPATIENT
Start: 2017-08-31 | End: 2017-08-31 | Stop reason: HOSPADM

## 2017-08-31 RX ORDER — PANTOPRAZOLE SODIUM 40 MG/1
40 TABLET, DELAYED RELEASE ORAL DAILY
COMMUNITY
End: 2022-02-16

## 2017-08-31 RX ORDER — FENTANYL CITRATE 50 UG/ML
25 INJECTION, SOLUTION INTRAMUSCULAR; INTRAVENOUS
Status: DISCONTINUED | OUTPATIENT
Start: 2017-08-31 | End: 2017-08-31 | Stop reason: HOSPADM

## 2017-08-31 RX ORDER — THERA TABS 400 MCG
1 TAB ORAL DAILY
Status: DISCONTINUED | OUTPATIENT
Start: 2017-09-01 | End: 2017-09-01 | Stop reason: HOSPADM

## 2017-08-31 RX ORDER — HEPARIN SODIUM 200 [USP'U]/100ML
3000 INJECTION, SOLUTION INTRAVENOUS ONCE
Status: COMPLETED | OUTPATIENT
Start: 2017-08-31 | End: 2017-08-31

## 2017-08-31 RX ORDER — SODIUM CHLORIDE 0.9 % (FLUSH) 0.9 %
5-10 SYRINGE (ML) INJECTION AS NEEDED
Status: DISCONTINUED | OUTPATIENT
Start: 2017-08-31 | End: 2017-09-01 | Stop reason: HOSPADM

## 2017-08-31 RX ORDER — FENTANYL CITRATE 50 UG/ML
INJECTION, SOLUTION INTRAMUSCULAR; INTRAVENOUS AS NEEDED
Status: DISCONTINUED | OUTPATIENT
Start: 2017-08-31 | End: 2017-08-31 | Stop reason: HOSPADM

## 2017-08-31 RX ORDER — DOBUTAMINE HYDROCHLORIDE 200 MG/100ML
INJECTION INTRAVENOUS
Status: DISCONTINUED
Start: 2017-08-31 | End: 2017-08-31

## 2017-08-31 RX ORDER — SODIUM CHLORIDE 0.9 % (FLUSH) 0.9 %
5-10 SYRINGE (ML) INJECTION EVERY 8 HOURS
Status: DISCONTINUED | OUTPATIENT
Start: 2017-08-31 | End: 2017-09-01 | Stop reason: HOSPADM

## 2017-08-31 RX ORDER — HEPARIN SODIUM 1000 [USP'U]/ML
INJECTION, SOLUTION INTRAVENOUS; SUBCUTANEOUS
Status: DISCONTINUED
Start: 2017-08-31 | End: 2017-08-31

## 2017-08-31 RX ORDER — ACETAMINOPHEN 325 MG/1
650 TABLET ORAL
Status: DISCONTINUED | OUTPATIENT
Start: 2017-08-31 | End: 2017-09-01 | Stop reason: HOSPADM

## 2017-08-31 RX ORDER — ONDANSETRON 2 MG/ML
4 INJECTION INTRAMUSCULAR; INTRAVENOUS
Status: DISCONTINUED | OUTPATIENT
Start: 2017-08-31 | End: 2017-09-01 | Stop reason: HOSPADM

## 2017-08-31 RX ORDER — MIDAZOLAM HYDROCHLORIDE 1 MG/ML
INJECTION, SOLUTION INTRAMUSCULAR; INTRAVENOUS
Status: DISCONTINUED
Start: 2017-08-31 | End: 2017-08-31

## 2017-08-31 RX ORDER — DOBUTAMINE HYDROCHLORIDE 200 MG/100ML
2.5-1 INJECTION INTRAVENOUS
Status: DISCONTINUED | OUTPATIENT
Start: 2017-08-31 | End: 2017-08-31

## 2017-08-31 RX ORDER — FENTANYL CITRATE 50 UG/ML
50 INJECTION, SOLUTION INTRAMUSCULAR; INTRAVENOUS AS NEEDED
Status: DISCONTINUED | OUTPATIENT
Start: 2017-08-31 | End: 2017-08-31 | Stop reason: HOSPADM

## 2017-08-31 RX ORDER — MIDAZOLAM HYDROCHLORIDE 1 MG/ML
1 INJECTION, SOLUTION INTRAMUSCULAR; INTRAVENOUS AS NEEDED
Status: DISCONTINUED | OUTPATIENT
Start: 2017-08-31 | End: 2017-08-31 | Stop reason: HOSPADM

## 2017-08-31 RX ORDER — FERROUS SULFATE, DRIED 160(50) MG
1 TABLET, EXTENDED RELEASE ORAL 2 TIMES DAILY
Status: DISCONTINUED | OUTPATIENT
Start: 2017-09-01 | End: 2017-09-01 | Stop reason: HOSPADM

## 2017-08-31 RX ORDER — HEPARIN SODIUM 10000 [USP'U]/100ML
12-25 INJECTION, SOLUTION INTRAVENOUS
Status: DISCONTINUED | OUTPATIENT
Start: 2017-08-31 | End: 2017-08-31

## 2017-08-31 RX ORDER — ENOXAPARIN SODIUM 100 MG/ML
1 INJECTION SUBCUTANEOUS ONCE
Status: COMPLETED | OUTPATIENT
Start: 2017-08-31 | End: 2017-08-31

## 2017-08-31 RX ORDER — FENTANYL CITRATE 50 UG/ML
INJECTION, SOLUTION INTRAMUSCULAR; INTRAVENOUS
Status: DISCONTINUED
Start: 2017-08-31 | End: 2017-08-31

## 2017-08-31 RX ORDER — MIDAZOLAM HYDROCHLORIDE 1 MG/ML
INJECTION, SOLUTION INTRAMUSCULAR; INTRAVENOUS AS NEEDED
Status: DISCONTINUED | OUTPATIENT
Start: 2017-08-31 | End: 2017-08-31 | Stop reason: HOSPADM

## 2017-08-31 RX ORDER — FLECAINIDE ACETATE 50 MG/1
50 TABLET ORAL 2 TIMES DAILY
COMMUNITY

## 2017-08-31 RX ORDER — LIDOCAINE HYDROCHLORIDE 10 MG/ML
0.1 INJECTION, SOLUTION EPIDURAL; INFILTRATION; INTRACAUDAL; PERINEURAL AS NEEDED
Status: DISCONTINUED | OUTPATIENT
Start: 2017-08-31 | End: 2017-08-31 | Stop reason: HOSPADM

## 2017-08-31 RX ORDER — HEPARIN SODIUM 200 [USP'U]/100ML
INJECTION, SOLUTION INTRAVENOUS
Status: COMPLETED
Start: 2017-08-31 | End: 2017-08-31

## 2017-08-31 RX ORDER — DABIGATRAN ETEXILATE 150 MG/1
150 CAPSULE ORAL 2 TIMES DAILY
Status: DISCONTINUED | OUTPATIENT
Start: 2017-09-01 | End: 2017-09-01 | Stop reason: HOSPADM

## 2017-08-31 RX ORDER — HEPARIN SODIUM 1000 [USP'U]/ML
1000-10000 INJECTION, SOLUTION INTRAVENOUS; SUBCUTANEOUS
Status: DISCONTINUED | OUTPATIENT
Start: 2017-08-31 | End: 2017-08-31 | Stop reason: HOSPADM

## 2017-08-31 RX ORDER — DILTIAZEM HYDROCHLORIDE 180 MG/1
180 CAPSULE, COATED, EXTENDED RELEASE ORAL DAILY
Status: DISCONTINUED | OUTPATIENT
Start: 2017-09-01 | End: 2017-09-01

## 2017-08-31 RX ORDER — SERTRALINE HYDROCHLORIDE 50 MG/1
50 TABLET, FILM COATED ORAL DAILY
Status: DISCONTINUED | OUTPATIENT
Start: 2017-09-01 | End: 2017-09-01 | Stop reason: HOSPADM

## 2017-08-31 RX ORDER — MIDAZOLAM HYDROCHLORIDE 1 MG/ML
0.5 INJECTION, SOLUTION INTRAMUSCULAR; INTRAVENOUS
Status: DISCONTINUED | OUTPATIENT
Start: 2017-08-31 | End: 2017-08-31 | Stop reason: HOSPADM

## 2017-08-31 RX ORDER — OXYCODONE AND ACETAMINOPHEN 5; 325 MG/1; MG/1
1 TABLET ORAL
Status: DISCONTINUED | OUTPATIENT
Start: 2017-08-31 | End: 2017-09-01 | Stop reason: HOSPADM

## 2017-08-31 RX ORDER — ALPRAZOLAM 0.25 MG/1
0.25 TABLET ORAL
Status: DISCONTINUED | OUTPATIENT
Start: 2017-08-31 | End: 2017-09-01 | Stop reason: HOSPADM

## 2017-08-31 RX ORDER — HYDROMORPHONE HYDROCHLORIDE 1 MG/ML
0.5 INJECTION, SOLUTION INTRAMUSCULAR; INTRAVENOUS; SUBCUTANEOUS
Status: DISCONTINUED | OUTPATIENT
Start: 2017-08-31 | End: 2017-08-31 | Stop reason: HOSPADM

## 2017-08-31 RX ORDER — PROPOFOL 10 MG/ML
INJECTION, EMULSION INTRAVENOUS AS NEEDED
Status: DISCONTINUED | OUTPATIENT
Start: 2017-08-31 | End: 2017-08-31 | Stop reason: HOSPADM

## 2017-08-31 RX ORDER — ONDANSETRON 2 MG/ML
4 INJECTION INTRAMUSCULAR; INTRAVENOUS AS NEEDED
Status: DISCONTINUED | OUTPATIENT
Start: 2017-08-31 | End: 2017-08-31 | Stop reason: HOSPADM

## 2017-08-31 RX ORDER — SUCRALFATE 1 G/10ML
1 SUSPENSION ORAL
Status: DISCONTINUED | OUTPATIENT
Start: 2017-09-01 | End: 2017-09-01 | Stop reason: HOSPADM

## 2017-08-31 RX ORDER — FENTANYL CITRATE 50 UG/ML
12.5-5 INJECTION, SOLUTION INTRAMUSCULAR; INTRAVENOUS
Status: DISCONTINUED | OUTPATIENT
Start: 2017-08-31 | End: 2017-08-31

## 2017-08-31 RX ORDER — PANTOPRAZOLE SODIUM 40 MG/1
40 TABLET, DELAYED RELEASE ORAL DAILY
Status: DISCONTINUED | OUTPATIENT
Start: 2017-09-01 | End: 2017-09-01 | Stop reason: HOSPADM

## 2017-08-31 RX ORDER — MIDAZOLAM HYDROCHLORIDE 1 MG/ML
1-5 INJECTION, SOLUTION INTRAMUSCULAR; INTRAVENOUS
Status: DISCONTINUED | OUTPATIENT
Start: 2017-08-31 | End: 2017-08-31

## 2017-08-31 RX ORDER — HEPARIN SODIUM 10000 [USP'U]/100ML
INJECTION, SOLUTION INTRAVENOUS
Status: DISCONTINUED
Start: 2017-08-31 | End: 2017-08-31

## 2017-08-31 RX ORDER — HYDROCODONE BITARTRATE AND ACETAMINOPHEN 5; 325 MG/1; MG/1
1 TABLET ORAL AS NEEDED
Status: DISCONTINUED | OUTPATIENT
Start: 2017-08-31 | End: 2017-08-31 | Stop reason: HOSPADM

## 2017-08-31 RX ORDER — PROTAMINE SULFATE 10 MG/ML
INJECTION, SOLUTION INTRAVENOUS
Status: DISCONTINUED
Start: 2017-08-31 | End: 2017-08-31

## 2017-08-31 RX ORDER — PROTAMINE SULFATE 10 MG/ML
25-100 INJECTION, SOLUTION INTRAVENOUS
Status: DISCONTINUED | OUTPATIENT
Start: 2017-08-31 | End: 2017-08-31

## 2017-08-31 RX ORDER — SUCCINYLCHOLINE CHLORIDE 20 MG/ML
INJECTION INTRAMUSCULAR; INTRAVENOUS AS NEEDED
Status: DISCONTINUED | OUTPATIENT
Start: 2017-08-31 | End: 2017-08-31 | Stop reason: HOSPADM

## 2017-08-31 RX ADMIN — SODIUM CHLORIDE 100 ML/HR: 900 INJECTION, SOLUTION INTRAVENOUS at 12:28

## 2017-08-31 RX ADMIN — Medication 10 ML: at 22:49

## 2017-08-31 RX ADMIN — ONDANSETRON 4 MG: 2 INJECTION INTRAMUSCULAR; INTRAVENOUS at 16:14

## 2017-08-31 RX ADMIN — MIDAZOLAM HYDROCHLORIDE 2 MG: 1 INJECTION, SOLUTION INTRAMUSCULAR; INTRAVENOUS at 13:42

## 2017-08-31 RX ADMIN — PROTAMINE SULFATE 60 MG: 10 INJECTION, SOLUTION INTRAVENOUS at 15:58

## 2017-08-31 RX ADMIN — FENTANYL CITRATE 100 MCG: 50 INJECTION, SOLUTION INTRAMUSCULAR; INTRAVENOUS at 13:48

## 2017-08-31 RX ADMIN — HEPARIN SODIUM 3000 UNITS: 200 INJECTION, SOLUTION INTRAVENOUS at 14:21

## 2017-08-31 RX ADMIN — HEPARIN SODIUM 12000 UNITS: 1000 INJECTION, SOLUTION INTRAVENOUS; SUBCUTANEOUS at 14:32

## 2017-08-31 RX ADMIN — FLECAINIDE ACETATE 50 MG: 100 TABLET ORAL at 22:18

## 2017-08-31 RX ADMIN — DOBUTAMINE HYDROCHLORIDE 10 MCG/KG/MIN: 200 INJECTION INTRAVENOUS at 14:29

## 2017-08-31 RX ADMIN — SUCCINYLCHOLINE CHLORIDE 80 MG: 20 INJECTION INTRAMUSCULAR; INTRAVENOUS at 13:49

## 2017-08-31 RX ADMIN — HEPARIN SODIUM 3000 UNITS: 1000 INJECTION, SOLUTION INTRAVENOUS; SUBCUTANEOUS at 14:48

## 2017-08-31 RX ADMIN — FENTANYL CITRATE 50 MCG: 50 INJECTION, SOLUTION INTRAMUSCULAR; INTRAVENOUS at 15:32

## 2017-08-31 RX ADMIN — HEPARIN SODIUM 1000 UNITS/HR: 10000 INJECTION, SOLUTION INTRAVENOUS at 14:32

## 2017-08-31 RX ADMIN — FENTANYL CITRATE 50 MCG: 50 INJECTION, SOLUTION INTRAMUSCULAR; INTRAVENOUS at 16:12

## 2017-08-31 RX ADMIN — ENOXAPARIN SODIUM 70 MG: 80 INJECTION SUBCUTANEOUS at 22:41

## 2017-08-31 RX ADMIN — MIDAZOLAM HYDROCHLORIDE 2 MG: 1 INJECTION, SOLUTION INTRAMUSCULAR; INTRAVENOUS at 13:43

## 2017-08-31 RX ADMIN — PROPOFOL 120 MG: 10 INJECTION, EMULSION INTRAVENOUS at 13:49

## 2017-08-31 NOTE — PROCEDURES
[address]  [phone]    Patient ID:  Patient: Jaylene Swenson  MRN: [id number]  Age: 57 y.o.  : 1955  Gender: female  Study Date: 2017    History: This is a female with a history of paroxysmal atrial fibrillation, off anticoagulation prior to ablation.  She is here for TODD evaluation to screen for intracardiac thrombus.      PROCEDURE: The study included complete 2D imaging, M-mode, complete spectral Doppler, and color Doppler. The TODD probe was passed easily into the esophagus. Images were adequate. At the end of the procedure, the probe was removed without issue. There were no complications during the procedure. Sinus rhythm was present. Sedation was administered by the anesthesiologist who was in constant attendance throughout the procedure.       FINDINGS:  LEFT VENTRICLE: Size was normal. Systolic function was normal with an ejection fraction estimated to be 55-60%. Wall thickness was normal.    RIGHT VENTRICLE: The size was normal. Systolic function was normal. Wall thickness was normal.    LEFT ATRIUM: Size was normal.  No thrombus or mass was identified.  APPENDAGE:  No thrombus was identified. ATRIAL SEPTUM: No defect or patent foramen ovale was identified with color flow doppler. No septal aneurysm. RIGHT ATRIUM: Size was normal. No thrombus or mass was identified. MITRAL VALVE:  Normal valve structure. There was normal leaflet separation.  DOPPLER: There was trace non-rheumatic regurgitation. AORTIC VALVE: The aortic valve was trileaflet. Leaflets exhibited normal cuspal separation.  No mass, vegetation or thrombus noted. DOPPLER: There was no regurgitation.      PULMONIC VALVE:  Poorly-visualized, no obvious abnormality. TRICUSPID VALVE: Normal valve structure. There was normal leaflet separation. No obvious mass, vegetation or thrombus noted.  DOPPLER: There was trace non-rheumatic regurgitation.      AORTA: Normal root. No dissection, aneurysm, or mobile plaque in visualized portions.  There was no atherosclerosis.     PERICARDIUM:  No significant pericardial effusion. Normal thickness of pericardium.         SUMMARY:  1. Normal LV systolic function, EF 81-91%. 2. Trace nonrheumatic mitral and tricuspid valve regurgitation. 3. No intracardiac thrombus.         Preoperative Diagnosis: As above. Postoperative Diagnosis: As above. Procedure: As above. Surgeon(s) and Role: Tacho Prieto MD - Primary    Anesthesia:  General.  Estimated Blood Loss: None. Specimens: * No specimens in log *    Findings: As above.   Complications: None.      Signed:   Tacho Prieto MD

## 2017-08-31 NOTE — PROCEDURES
[address]  [phone]    Patient ID:  Patient: Jos Benitez  MRN: [id number]  Age: 57 y.o.  : 1955  Gender: female  Study Date: 2017    History:  This is a female with paroxysmal and symptomatic atrial fibrillation, here for invasive EP study and atrial fibrillation ablation.      Procedures Performed:   1. Comprehensive EP study with transseptal access L/R atrium, pulmonary vein isolation (PVI) (60200)   2. Additional ablation for atrial fibrillation substrate (left atrial roof line) (05162)   3. Additional ablation for an arrhythmia other than atrial fibrillation (anterior mitral isthmus line) (51469)   4. Use and interpretation of intra cardiac echocardiography (11507-48)   5. Intracardiac electrophysiologic 3-D XOEMBAY (22824)  6. Pacing and stimulation for arrhythmia induction during IV drug infusion (23483)      Summary:   1. Successful pulmonary vein isolation for treatment of atrial fibrillation with four of four pulmonary vein ostia selectively treated with radiofrequency energy.  Additional work in the left atrium was performed as described below. 2. Antegrade conduction was midline without preexcitation and decremental.  3. Atypical AV danny reentry was noted during dobutamine infusion and was nonsustained.      Plan:   1. Continue oral anticoagulant (Pradaxa). 2. Carafate and proton pump inhibitor for 4 weeks to protect from esophageal injury. 3. Antiarrhythmic therapy will be considered but the patient prefers to avoid this at this time. 4. Will see if the atypical AV node reentry is clinically relevant in the future.   I chose to avoid the very small risk of complete heart block if ablation were performed at this time.      Follow up Plan:   1. F/U in office in 2 weeks.      Procedure description:   After consent was obtained, the patient was brought to the EP lab and sedated by general anesthesia by the anesthesiologist who remained in attendance throughout the case. One sheath was inserted into the left femoral vein and three sheaths into the right femoral vein in the usual fashion without issue.  A deflectable duodecapolar coronary sinus catheter was used for pacing and recording from the left atrium. Initial catheters advanced into the heart under fluoroscopic guidance. A roving catheter was used to pace and record from the right atrium, the His bundle location, and the right ventricular apex.      The baseline rhythm was sinus bradycardia with first degree AV block (SCL 1075, , QRS 85,  msec).  The AH and HV intervals were 126 and 65 msec, respectively.  Antegrade conduction was midline and decremental without preexcitation.  The WBCL was 520 msec and AVNERP and AERP, 600/530 and 600/<=190 msec, respectively.  Retrograde conduction was midline and decremental. The retrograde WBCL was 600 msec.  The VERP was 600/260 msec pacing from the right ventricular apex. Differential pacing showed the preexisting cavotricuspid isthmus line was intact with bidirectional block confirmed. An intra-cardiac echo probe was advanced into the right atrium and used to visualize the valves, the left atrium, the pulmonary veins, and the fossa ovale. Under fluoroscopic and echocardiographic guidance, a double transseptal puncture was carried out in the usual fashion using the SL-1 sheath and BRK-1 needle.  An Agilis sheath was exchanged over a wire and the SL-1 sheath was kept during second access. Jordan Dunn was given prior to the first transseptal puncture and boluses were given with a goal ACT of >300 sec. Dobutamine infusion was used at 10 mcg to see if arrhythmia could be induced. This was started prior to any ablation therapy.      A deflectable, irrigated ablation catheter with contact-force technology and a 20-pole spiral catheter were advanced into the left atrium.  Using the [facility] [place] three dimensional electroanatomic mapping system, a 3D voltage map was created of the left atrium, all pulmonary veins, and the left atrial appendage.      Using a spiral-guided approach, a series of ablation lesions were given selectively around the ostium of the four pulmonary veins until isolation was achieved as evidenced by lack of recorded pulmonary vein potentials and exit block from pacing within the pulmonary veins.      Additional therapy for atrial fibrillation: the left atrial roof line was made by joining a line from the left superior and right superior pulmonary veins, coursing anteriorly to try to avoid any esophageal heating.  Block was confirmed with pacing from the TYRESE and measuring conduction time to the posterior left atrium. Additional therapy for non-atrial fibrillation arrhythmia: ablation of the anterior mitral isthmus was performed using linear lesioning from the left atrial roof line anterior to the left atrial appendage and down to the mitral annulus.  Block was confirmed with differential pacing across line. During ablation, the temperature was continually monitored in the esophagus and lesions were stopped if there was any temperature rise.      After the catheters were withdrawn into the right atrium, heparin infusion was discontinued.     Once the ablation lesion set was completed, the patient remained in normal sinus rhythm.  The catheters were withdrawn to the inferior vena cava and the heparin was reversed with a test dose of protamine followed by the full dose to a total of 60 mg.      Repeat scanning with the intracardiac ultrasound did not show any significant pericardial effusion accumulation. The patient was extubated after sheaths removed and sent to the recovery area.          Preoperative Diagnosis: As above. Postoperative Diagnosis: As above. Procedure: As above.   Surgeon(s) and Role: Marilu Loredo MD - Primary   Anesthesia: Dorregina Yo.  Estimated Blood Loss: 40 cc.  Specimens: * No specimens in log *   Findings: As above.   Complications: None.      Ablation therapy: 13 treatments totalling 25.1 minutes of RFA  X-ray time: 14.2 minutes      Signed:  Gary Lew MD

## 2017-08-31 NOTE — H&P
[address] [place]   HISTORY AND PHYSICAL       Name:  Wilson Marley   MR#:  [id number]   :  1955   Account #:  [de-identified]        Date of Adm:  2017       CHIEF COMPLAINT: Symptomatic atrial fibrillation. HISTORY OF PRESENT ILLNESS: This is a 61-year-old female with a   history of paroxysmal and symptomatic atrial fibrillation. She has a   history of atrial flutter, and actually earlier this year, had an ablation for   typical right atrial flutter. Noted during her study was that she had   paroxysmal atrial fibrillation. She continues to have this arrhythmia,   and so now is here in lieu of antiarrhythmic drug therapy. Due to her   slower heart rate, she is not a candidate for most antiarrhythmic drugs. After hearing about dofetilide and flecainide, she did not want to go in   that direction. She denies any bleeding issues on Pradaxa. This was stopped prior to   her procedure. ALLERGIES: MULTIPLE, PLEASE SEE THE LIST. PAST MEDICAL HISTORY: As above. FAMILY HISTORY: Noncontributory. SOCIAL HISTORY: . Nonsmoker. REVIEW OF SYSTEMS: Except as noted above, it is otherwise   noncontributory. PHYSICAL EXAMINATION   VITAL SIGNS: Temperature 98.2 Fahrenheit, pulse 54, blood pressure   131/83, respirations 18, oxygen saturation 100% on room air, weight   66.7 kg (147 pounds). GENERAL: Nondiaphoretic, not in acute distress, middle-aged female. RESPIRATORY: Unlabored, clear to auscultation bilaterally anteriorly. CARDIAC: Regular rate and rhythm, slightly bradycardic. No murmur. Palpable radial pulses bilaterally. ABDOMEN: Soft, nontender, nondistended. EXTREMITIES: No cyanosis or clubbing. NEUROLOGIC: Awake, appropriate, grossly nonfocal. No resting   tremor. TELEMETRY: Sinus bradycardia at a rate of 50-5 beats per minute is   noted. IMPRESSION   1.  Paroxysmal symptomatic atrial fibrillation. She is on chronic   anticoagulation with Pradaxa. 2. Proximal right atrial flutter, status post prior cavotricuspid isthmus   ablation. 3. As above. RECOMMENDATIONS   1. I discussed the potential benefits, risks and alternatives with her   regarding transesophageal echo, followed by atrial fibrillation ablation. We will lean towards a catheter-based approach with radiofrequency   energy. Given these, she agrees to proceed. We discussed the   potential benefits, risks and alternatives prior as well in the office on   07/24/2017.   2. Will continue anticoagulation post procedure. ALESSIA MCKEON MD       / [facility]   D:  08/31/2017   14:15   T:  08/31/2017   14:33   Job #:  [id number]

## 2017-08-31 NOTE — PROGRESS NOTES
TRANSFER - IN REPORT:    Verbal report received from Kiet1 N Haydee  (name) on Farshad Villareal  being received from PACU (unit) for ordered procedure      Report consisted of patients Situation, Background, Assessment and   Recommendations(SBAR). Information from the following report(s) SBAR, Kardex, Procedure Summary, Intake/Output, MAR and Recent Results was reviewed with the receiving nurse. Opportunity for questions and clarification was provided. Assessment completed upon patients arrival to unit and care assumed.

## 2017-08-31 NOTE — ANESTHESIA POSTPROCEDURE EVALUATION
Post-Anesthesia Evaluation and Assessment    Patient: Bubba Ray MRN: [id number]  SSN: [id number]    YOB: 1955  Age: 57 y.o. Sex: female       Cardiovascular Function/Vital Signs  Visit Vitals    /67 (BP 1 Location: Left arm, BP Patient Position: At rest)    Pulse 63    Temp 36.4 °C (97.6 °F)    Resp 19    Ht 5' 3\" (1.6 m)    Wt 66.7 kg (147 lb)    SpO2 100%    Breastfeeding No    BMI 26.04 kg/m2       Patient is status post general anesthesia for * No procedures listed *. Nausea/Vomiting: None    Postoperative hydration reviewed and adequate. Pain:  Pain Scale 1: Numeric (0 - 10) (08/31/17 1646)  Pain Intensity 1: 0 (08/31/17 1646)   Managed    Neurological Status:   Neuro (WDL): Exceptions to WDL (08/31/17 1646)  Neuro  Neurologic State: Alert; Appropriate for age;Drowsy (08/31/17 1646)  Orientation Level: Oriented to person;Oriented to place;Oriented to situation (08/31/17 1646)  Cognition: Follows commands (08/31/17 1646)  LUE Motor Response: Purposeful (08/31/17 1646)  LLE Motor Response: Purposeful (08/31/17 1646)  RUE Motor Response: Purposeful (08/31/17 1646)  RLE Motor Response: Purposeful (08/31/17 1646)   At baseline    Mental Status and Level of Consciousness: Arousable    Pulmonary Status:   O2 Device: Nasal cannula (08/31/17 1646)   Adequate oxygenation and airway patent    Complications related to anesthesia: None    Post-anesthesia assessment completed.  No concerns    Signed By: Medardo Garcia MD     August 31, 2017

## 2017-08-31 NOTE — PERIOP NOTES
TRANSFER - OUT REPORT:    Verbal report given to DERRICK Rhodes (name) on Lesa Helm  being transferred to 2152 (unit) for routine post - op       Report consisted of patients Situation, Background, Assessment and   Recommendations(SBAR). Information from the following report(s) SBAR, OR Summary, Intake/Output, MAR, Recent Results, Med Rec Status and Cardiac Rhythm NSR was reviewed with the receiving nurse. Opportunity for questions and clarification was provided.       Patient transported with:   Monitor  Registered Nurse  Tech

## 2017-08-31 NOTE — IP AVS SNAPSHOT
[address] [facility] 
[phone] Patient: Bubba Ray 
MRN: [id number] TKR:2/3/7226 You are allergic to the following Allergen Reactions Latex Rash Adhesive Tape-Silicones Rash Erythromycin Rash Neomycin Rash Nickel Rash  
    
 Nsaids (Non-Steroidal Anti-Inflammatory Drug) Other (comments) Bleeding ulcers Recent Documentation Height Weight Breastfeeding? BMI OB Status Smoking Status 1.6 m 66.7 kg No 26.04 kg/m2 Hysterectomy Never Smoker Emergency Contacts Name Discharge Info Relation Home Work Mobile [address] [facility] [address] [facility] CAREGIVER [3] Spouse [3] [phone] About your hospitalization You were admitted on:  August 31, 2017 You last received care in the:  [facility] 2 [facility] CARDIO You were discharged on:  September 1, 2017 Unit phone number:  [phone] Why you were hospitalized Your primary diagnosis was:  Not on File Your diagnoses also included:  S/P Ablation Of Atrial Fibrillation Providers Seen During Your Hospitalizations Provider Role Specialty Primary office phone Umm Castañeda MD Attending Provider Cardiology [phone] Your Primary Care Physician (PCP) Primary Care Physician Office Phone Office Fax [facility] [phone] Follow-up Information Follow up With Details Comments Contact Info Enmanuel Henderson MD   [address] [facility] 
[phone] Umm Castañeda MD  Please follow-up with Dr. Pooja Shearer in 2 weeks. Thank you! [address] [facility] 
[phone] Current Discharge Medication List  
  
START taking these medications Dose & Instructions Dispensing Information Comments Morning Noon Evening Bedtime benzocaine-menthol 15-10 mg Lozg lozenge Commonly known as:  Idalia Jeffy Your last dose was: Your next dose is:    
   
   
 Dose:  1 Lozenge Take 1 Lozenge by mouth every two (2) hours as needed for Sore throat. Quantity:  15 Lozenge Refills:  0  
     
   
   
   
  
 sucralfate 100 mg/mL suspension Commonly known as:  Margie Bushy Your last dose was: Your next dose is:    
   
   
 Dose:  1 g Take 10 mL by mouth Before breakfast and dinner for 30 days. Quantity:  600 mL Refills:  0 CONTINUE these medications which have NOT CHANGED Dose & Instructions Dispensing Information Comments Morning Noon Evening Bedtime ALPRAZolam 0.25 mg tablet Commonly known as:  Linden Angle Your last dose was: Your next dose is: Take  by mouth as needed for Anxiety. Refills:  0 Azelastine 0.15 % (205.5 mcg) nasal spray Commonly known as:  ASTEPRO Your last dose was: Your next dose is:    
   
   
 two (2) times a day. Refills:  0  
     
   
   
   
  
 CALCIUM 600 WITH VITAMIN D3 600 mg(1,500mg) -400 unit Cap Generic drug:  Calcium-Cholecalciferol (D3) Your last dose was: Your next dose is:    
   
   
 Dose:  2 Cap Take 2 Caps by mouth daily. Refills:  0  
     
   
   
   
  
 dabigatran etexilate 150 mg capsule Commonly known as:  PRADAXA Your last dose was: Your next dose is:    
   
   
 Dose:  150 mg Take 1 Cap by mouth two (2) times a day. Quantity:  60 Cap Refills:  2  
     
   
   
   
  
 fish oil-omega-3 fatty acids 340-1,000 mg capsule Your last dose was: Your next dose is:    
   
   
 Dose:  1 Cap Take 1 Cap by mouth daily. Refills:  0  
     
   
   
   
  
 flecainide 50 mg tablet Commonly known as:  TAMBOCOR Your last dose was:     
   
Your next dose is:    
   
   
 Dose:  50 mg  
 Take 50 mg by mouth two (2) times a day. Refills:  0  
     
   
   
   
  
 multivitamin tablet Commonly known as:  ONE A DAY Your last dose was: Your next dose is:    
   
   
 Dose:  1 Tab Take 1 Tab by mouth daily. Refills:  0 PREMARIN 0.625 mg/gram vaginal cream  
Generic drug:  conjugated estrogens Your last dose was: Your next dose is:    
   
   
 Dose:  0.5 g Insert 0.5 g into vagina daily. Refills:  0 PROTONIX 40 mg tablet Generic drug:  pantoprazole Your last dose was: Your next dose is:    
   
   
 Dose:  40 mg Take 40 mg by mouth daily. Refills:  0 RHINOCORT AQUA 32 mcg/actuation nasal spray Generic drug:  budesonide Your last dose was: Your next dose is:    
   
   
  Refills:  0  
     
   
   
   
  
 sertraline 50 mg tablet Commonly known as:  ZOLOFT Your last dose was: Your next dose is: Take  by mouth daily. Refills:  0 STOP taking these medications   
 dilTIAZem  mg ER capsule Commonly known as:  CARDIZEM CD Where to Get Your Medications These medications were sent to [address], [address] Phone:  [phone]  
  benzocaine-menthol 15-10 mg Lozg lozenge Information on where to get these meds will be given to you by the nurse or doctor. ! Ask your nurse or doctor about these medications  
  sucralfate 100 mg/mL suspension Discharge Instructions POST-EP STUDY AND ABLATION DISCHARGE INSTRUCTIONS: 
 
You had a TODD followed by an atrial fibrillation ablation (using radiofrequency energy) with Dr. Rita Rodriguez on 8/31.  
 
Please make an appointment with Dr. Maryjane Nielsen nurse practitioner, Forrest Frey Lucius in 2 weeks. After this, you will be made an appointment for 2-3 months with Dr. Jeremy Glaser. Please continue your medications as instructed (stop diltiazem). Do not drive, operate any machinery, or sign any legal documents for 24 hours after your procedure. You must have someone to drive you home. You may take a shower 24 hours after your cardiac procedure. Be sure to get the dressing wet and then remove it; gently wash the area with warm soapy water. Pat dry and leave open to air. To help prevent infections, be sure to keep the cath site clean and dry. No lotions, creams, powders, ointments, etc. in the cath site for approximately 1 week. ? Do not take a tub bath, get in a hot tub or swimming pool for approximately 5 days or until the cath site is completely healed. ? No strenuous activity or heavy lifting over 20 lbs. for 7 days. ? After your procedure, some bruising or discomfort is common during the healing process. Tylenol, 1-2 tablets every 6 hours as needed, is recommended if you experience any discomfort. If you experience any signs or symptoms of infection such as fever, chills, or poorly healing incision, persistent tenderness or swelling in the groin, redness and/or warmth to the touch, numbness, significant tingling or pain at the groin site or affected extremity, rash, drainage from the site, or if the leg feels tight or swollen, call your physician right away. ? If bleeding at the site occurs, take a clean gauze pad and apply direct pressure to the groin just above the puncture site, and call your physician right away. ? If your procedure involved ablation therapy, you may feel some mild or vague chest discomfort due to delivery of heat therapy to the heart muscle. This should resolve in 1-2 days.   If it gets worse or is associated with shortness of breath, dizziness, loss of consciousness, call your physician right away or call 911 if emergency medical care is needed. Discharge Orders None Introducing [facility]! Dear Richard Mcgeet: Thank you for requesting a [employer] account. Our records indicate that you already have an active [employer] account. You can access your account anytime at [email or web address] Did you know that you can access your hospital and ER discharge instructions at any time in [employer]? You can also review all of your test results from your hospital stay or ER visit. Additional Information If you have questions, please visit the Frequently Asked Questions section of the [employer] website at [email or web address] Remember, [employer] is NOT to be used for urgent needs. For medical emergencies, dial 911. Now available from your iPhone and Android! General Information Please provide this summary of care documentation to your next provider. Patient Signature:  ____________________________________________________________ Date:  ____________________________________________________________  
  
Rakel Mayorga Provider Signature:  ____________________________________________________________ Date:  ____________________________________________________________

## 2017-08-31 NOTE — ANESTHESIA PREPROCEDURE EVALUATION
Anesthetic History     PONV          Review of Systems / Medical History  Patient summary reviewed, nursing notes reviewed and pertinent labs reviewed    Pulmonary  Within defined limits                 Neuro/Psych         Psychiatric history     Cardiovascular            Dysrhythmias       Exercise tolerance: >4 METS     GI/Hepatic/Renal     GERD           Endo/Other        Arthritis     Other Findings              Physical Exam    Airway  Mallampati: II  TM Distance: 4 - 6 cm  Neck ROM: normal range of motion   Mouth opening: Normal     Cardiovascular  Regular rate and rhythm,  S1 and S2 normal,  no murmur, click, rub, or gallop             Dental  No notable dental hx       Pulmonary  Breath sounds clear to auscultation               Abdominal  GI exam deferred       Other Findings            Anesthetic Plan    ASA: 3  Anesthesia type: general    Monitoring Plan: BIS      Induction: Intravenous  Anesthetic plan and risks discussed with: Patient

## 2017-08-31 NOTE — PROGRESS NOTES
Doing OK post-procedure. Sore throat. No other issues. Results explained to patient and , all questions answered. Anticipate discharge tomorrow if no issues.

## 2017-08-31 NOTE — PROCEDURES
[address]  [phone]    Patient ID:  Patient: Bibiana Parry  MRN: [id number]  Age: 57 y.o.  : 1955  Gender: female  Study Date: 2017    History: This is a female with a history of paroxysmal atrial fibrillation, off anticoagulation prior to ablation.  She is here for TODD evaluation to screen for intracardiac thrombus.      PROCEDURE: The study included complete 2D imaging, M-mode, complete spectral Doppler, and color Doppler. The TODD probe was passed easily into the esophagus. Images were adequate. At the end of the procedure, the probe was removed without issue. There were no complications during the procedure.  Sinus rhythm was present.  Sedation was administered by the anesthesiologist who was in constant attendance throughout the procedure.       FINDINGS:  LEFT VENTRICLE: Size was normal. Systolic function was normal with an ejection fraction estimated to be 55-60%. Wall thickness was normal.    RIGHT VENTRICLE: The size was normal. Systolic function was normal. Wall thickness was normal.    LEFT ATRIUM: Size was normal.  No thrombus or mass was identified.  APPENDAGE:  No thrombus was identified. ATRIAL SEPTUM: No defect or patent foramen ovale was identified with color flow doppler. No septal aneurysm. RIGHT ATRIUM: Size was normal. No thrombus or mass was identified. MITRAL VALVE:  Normal valve structure. There was normal leaflet separation.  DOPPLER: There was trace non-rheumatic regurgitation. AORTIC VALVE: The aortic valve was trileaflet. Leaflets exhibited normal cuspal separation.  No mass, vegetation or thrombus noted. DOPPLER: There was no regurgitation.      PULMONIC VALVE:  Poorly-visualized, no obvious abnormality. TRICUSPID VALVE: Normal valve structure. There was normal leaflet separation. No obvious mass, vegetation or thrombus noted.  DOPPLER: There was trace non-rheumatic regurgitation.      AORTA: Normal root. No dissection, aneurysm, or mobile plaque in visualized portions.  There was no atherosclerosis.     PERICARDIUM:  No significant pericardial effusion. Normal thickness of pericardium.         SUMMARY:  1. Normal LV systolic function, EF 67-36%. 2. Trace nonrheumatic mitral and tricuspid valve regurgitation. 3. No intracardiac thrombus.         Preoperative Diagnosis: As above. Postoperative Diagnosis: As above. Procedure: As above. Surgeon(s) and Role: Marilu Loredo MD - Primary    Anesthesia: Dory Rough.  Estimated Blood Loss: None. Specimens: * No specimens in log *    Findings: As above.   Complications: None.      Signed:   Marilu Loredo MD

## 2017-08-31 NOTE — PERIOP NOTES
Handoff Report from Operating Room to PACU    Report received from Jaleel Coburn RN and Jose David Driver CRNA regarding Tierney Sy.      Surgeon(s):  Case Anesthesia  And Procedure(s) (LRB):  PACU/RECOVERY FROM TODD/PVI (N/A)  confirmed   with allergies, drains and dressings discussed. Anesthesia type, drugs, patient history, complications, estimated blood loss, vital signs, intake and output, and last pain medication, lines and temperature were reviewed.

## 2017-09-01 VITALS
TEMPERATURE: 98.2 F | BODY MASS INDEX: 26.05 KG/M2 | WEIGHT: 147 LBS | DIASTOLIC BLOOD PRESSURE: 67 MMHG | SYSTOLIC BLOOD PRESSURE: 102 MMHG | OXYGEN SATURATION: 99 % | HEIGHT: 63 IN | HEART RATE: 69 BPM | RESPIRATION RATE: 12 BRPM

## 2017-09-01 PROCEDURE — 74011250636 HC RX REV CODE- 250/636: Performed by: INTERNAL MEDICINE

## 2017-09-01 PROCEDURE — 74011250637 HC RX REV CODE- 250/637: Performed by: INTERNAL MEDICINE

## 2017-09-01 RX ORDER — SUCRALFATE 1 G/10ML
1 SUSPENSION ORAL
Qty: 600 ML | Refills: 0 | Status: SHIPPED | OUTPATIENT
Start: 2017-09-01 | End: 2017-10-01

## 2017-09-01 RX ADMIN — Medication 5 ML: at 14:00

## 2017-09-01 RX ADMIN — OMEGA-3 FATTY ACIDS CAP DELAYED RELEASE 1000 MG 1 CAPSULE: 1000 CAPSULE DELAYED RELEASE at 08:53

## 2017-09-01 RX ADMIN — SUCRALFATE 1 G: 1 SUSPENSION ORAL at 08:53

## 2017-09-01 RX ADMIN — DABIGATRAN ETEXILATE MESYLATE 150 MG: 150 CAPSULE ORAL at 08:53

## 2017-09-01 RX ADMIN — SERTRALINE HYDROCHLORIDE 50 MG: 50 TABLET ORAL at 08:53

## 2017-09-01 RX ADMIN — Medication 1 LOZENGE: at 01:33

## 2017-09-01 RX ADMIN — CALCIUM CARBONATE 500 MG (1,250 MG)-VITAMIN D3 200 UNIT TABLET 1 TABLET: at 08:53

## 2017-09-01 RX ADMIN — THERA TABS 1 TABLET: TAB at 08:53

## 2017-09-01 RX ADMIN — PANTOPRAZOLE SODIUM 40 MG: 40 TABLET, DELAYED RELEASE ORAL at 08:53

## 2017-09-01 RX ADMIN — Medication 10 ML: at 06:00

## 2017-09-01 RX ADMIN — ACETAMINOPHEN 650 MG: 325 TABLET ORAL at 01:33

## 2017-09-01 RX ADMIN — ACETAMINOPHEN 650 MG: 325 TABLET ORAL at 10:11

## 2017-09-01 RX ADMIN — SODIUM CHLORIDE 500 ML: 900 INJECTION, SOLUTION INTRAVENOUS at 08:49

## 2017-09-01 NOTE — CARDIO/PULMONARY
C/P Rehab Note:    Chart Reviewed. Pt with PAF S/P A fib Ablation,(8/31/17). Pt is a non smoker. TODD,(8/31/17), LV Ef 55-60%. History significant for:  -Depression  -SVT    Met with pt who was lying in bed,  at her side. Provided pt a handout on \" EP STUDY, AFTER YOUR PROCEDURE\". Reviewed activity restrictions: No tub baths or swimming for the first seven days. No driving for the first 24 hours or strenuous activities. Discussed signs and symptoms of infection and when to call the MD. The importance of taking medications as prescribed and keeping follow up appointments. Pt and  without questions and demonstrated understanding.

## 2017-09-01 NOTE — PROGRESS NOTES
2000-  Resting quietly with eyes closed.  at bedside. Only c/o \"sore throat\"  Chloroseptic PRN given. 2230-  Earl catheter removed. Assisted pt to sitting on side of bed. Pt c/o feeling lightheaded. Sat on side of bed for about 10 minutes. 2240-  Ambulated to bathroom. Voided. Performed self pericare. Pt back to bed. Pt/ c/o feeling \"hot and dizzy\". Pt layed down, cool cloth placed to forehead. Vital signs taken. HR 66 NSR and /77. Will continue to monitor. 2255-  Pt states that she is feeling better. Informed her that we will try again later to ambulate in hallway and that for now to just rest.  Will continue to monitor. 46-  Assisted out of bed. Ambulated to bathroom to void. Ambulated in hallway with nurse from room 2152 to room 2164 and back to room. Tolerated well. Requested to sit in chair for a while. 0510-  Pt stated \"I might lye back down in a few minutes, I don't feel quite right\". Pt currently sitting in chair drinking hot tea. Will continue to monitor. No bleeding or hematoma noted to bilateral groin sites.

## 2017-09-01 NOTE — PROGRESS NOTES
Patient ambulated in hallway without difficulty. Dressing CDI. No complaints. Discharge instructions reviewed with patient; to be discharged to home with . Site care instructions reviewed; site(s) CDI. Patient instructed on which medications to continue, which to start, and which to stop. Prescriptions sent to patient's pharmacy and will  at Dr. Evonnie Hammans office. Medication info provided and reviewed with patient. Follow-up appointment information given; follow-up appointment to be made by patient. IV and tele box removed. Opportunity for questions provided; all questions answered. All belongings returned. Patient wheeled to front door via wheelchair by volunteer; to be transported home by .

## 2017-09-01 NOTE — DISCHARGE INSTRUCTIONS
POST-EP STUDY AND ABLATION DISCHARGE INSTRUCTIONS:    You had a TODD followed by an atrial fibrillation ablation (using radiofrequency energy) with Dr. Dipesh Gunter on 8/31. Please make an appointment with Dr. Cody Velasquez nurse practitioner, Frankie Huber, in 2 weeks. After this, you will be made an appointment for 2-3 months with Dr. Dipesh Gunter. Please continue your medications as instructed (stop diltiazem). Do not drive, operate any machinery, or sign any legal documents for 24 hours after your procedure. You must have someone to drive you home. You may take a shower 24 hours after your cardiac procedure. Be sure to get the dressing wet and then remove it; gently wash the area with warm soapy water. Pat dry and leave open to air. To help prevent infections, be sure to keep the cath site clean and dry. No lotions, creams, powders, ointments, etc. in the cath site for approximately 1 week.  Do not take a tub bath, get in a hot tub or swimming pool for approximately 5 days or until the cath site is completely healed.  No strenuous activity or heavy lifting over 20 lbs. for 7 days.  After your procedure, some bruising or discomfort is common during the healing process. Tylenol, 1-2 tablets every 6 hours as needed, is recommended if you experience any discomfort. If you experience any signs or symptoms of infection such as fever, chills, or poorly healing incision, persistent tenderness or swelling in the groin, redness and/or warmth to the touch, numbness, significant tingling or pain at the groin site or affected extremity, rash, drainage from the site, or if the leg feels tight or swollen, call your physician right away.  If bleeding at the site occurs, take a clean gauze pad and apply direct pressure to the groin just above the puncture site, and call your physician right away.      If your procedure involved ablation therapy, you may feel some mild or vague chest discomfort due to delivery of heat therapy to the heart muscle. This should resolve in 1-2 days. If it gets worse or is associated with shortness of breath, dizziness, loss of consciousness, call your physician right away or call 911 if emergency medical care is needed.

## 2017-09-01 NOTE — PROGRESS NOTES
Doing OK post-ablation. Throat less sore. Some warmth and lightheadedness earlier with ambulation. Very mild chest discomfort with deep inspiration. Not worsening. No hematoma. Ambulatory and taking oral.      Visit Vitals    BP 91/60 (BP 1 Location: Left arm, BP Patient Position: At rest)    Pulse 68    Temp 98.6 °F (37 °C)    Resp 17    Ht 5' 3\" (1.6 m)    Wt 66.7 kg (147 lb)    SpO2 99%    Breastfeeding No    BMI 26.04 kg/m2       ND, NAD. Bilateral groin sites OK, no hematoma. RRR, good heart sounds, no rub. Lungs CTAB anteriorly. 2+ bilateral radial pulses. Awake, appropriate, neuro grossly nonfocal.      PLAN:  Let's give a normal saline bolus. Ambulate her after this. Stop diltiazem. If does OK later, discharge to home with F/U in the office in 2 weeks with my nurse practitioner, Moriah Polo. Continue Pradaxa. Protonix and carafate. All questions answered for her and . Patient is aware of signs and sx warranting urgent med F/U or calling 911.

## 2017-09-08 ENCOUNTER — HOSPITAL ENCOUNTER (OUTPATIENT)
Dept: GENERAL RADIOLOGY | Age: 62
Discharge: HOME | End: 2017-09-08
Attending: OTOLARYNGOLOGY
Payer: COMMERCIAL

## 2017-09-08 DIAGNOSIS — J02.9 SORE THROAT: ICD-10-CM

## 2017-09-08 PROCEDURE — 74220 X-RAY XM ESOPHAGUS 1CNTRST: CPT

## 2017-09-08 PROCEDURE — 74011636320 HC RX REV CODE- 636/320

## 2017-09-08 RX ADMIN — IOHEXOL 60 ML: 240 INJECTION, SOLUTION INTRATHECAL; INTRAVASCULAR; INTRAVENOUS; ORAL at 10:00

## 2017-09-19 ENCOUNTER — HOSPITAL ENCOUNTER (OUTPATIENT)
Dept: MAMMOGRAPHY | Age: 62
Discharge: HOME | End: 2017-09-19
Attending: INTERNAL MEDICINE
Payer: COMMERCIAL

## 2017-09-19 DIAGNOSIS — Z12.31 VISIT FOR SCREENING MAMMOGRAM: ICD-10-CM

## 2017-09-19 PROCEDURE — 77067 SCR MAMMO BI INCL CAD: CPT

## 2017-10-23 ENCOUNTER — HOSPITAL ENCOUNTER (OUTPATIENT)
Dept: MAMMOGRAPHY | Age: 62
Discharge: HOME | End: 2017-10-23
Attending: INTERNAL MEDICINE
Payer: COMMERCIAL

## 2017-10-23 DIAGNOSIS — M85.80 OSTEOPENIA: ICD-10-CM

## 2017-10-23 PROCEDURE — 77080 DXA BONE DENSITY AXIAL: CPT

## 2018-06-23 ENCOUNTER — HOSPITAL ENCOUNTER (OUTPATIENT)
Dept: MRI IMAGING | Age: 63
Discharge: HOME | End: 2018-06-23
Attending: ORTHOPAEDIC SURGERY
Payer: MEDICARE

## 2018-06-23 DIAGNOSIS — M54.12 CERVICAL RADICULITIS: ICD-10-CM

## 2018-06-23 DIAGNOSIS — M54.2 NECK PAIN: ICD-10-CM

## 2018-06-23 PROCEDURE — 72141 MRI NECK SPINE W/O DYE: CPT

## 2018-06-27 ENCOUNTER — ANESTHESIA (OUTPATIENT)
Dept: ENDOSCOPY | Age: 63
End: 2018-06-27
Payer: MEDICARE

## 2018-06-27 ENCOUNTER — HOSPITAL ENCOUNTER (OUTPATIENT)
Age: 63
Setting detail: OUTPATIENT SURGERY
Discharge: HOME | End: 2018-06-27
Attending: INTERNAL MEDICINE | Admitting: INTERNAL MEDICINE
Payer: MEDICARE

## 2018-06-27 ENCOUNTER — ANESTHESIA EVENT (OUTPATIENT)
Dept: ENDOSCOPY | Age: 63
End: 2018-06-27
Payer: MEDICARE

## 2018-06-27 VITALS
HEART RATE: 53 BPM | OXYGEN SATURATION: 100 % | RESPIRATION RATE: 14 BRPM | SYSTOLIC BLOOD PRESSURE: 141 MMHG | WEIGHT: 151.38 LBS | DIASTOLIC BLOOD PRESSURE: 64 MMHG | HEIGHT: 63 IN | TEMPERATURE: 97.5 F | BODY MASS INDEX: 26.82 KG/M2

## 2018-06-27 LAB
H PYLORI FROM TISSUE: NEGATIVE
KIT LOT NO., HCLOLOT: NORMAL
NEGATIVE CONTROL: NORMAL
POSITIVE CONTROL: NORMAL

## 2018-06-27 PROCEDURE — 88305 TISSUE EXAM BY PATHOLOGIST: CPT | Performed by: INTERNAL MEDICINE

## 2018-06-27 PROCEDURE — 77030019988 HC FCPS ENDOSC DISP BSC -B: Performed by: INTERNAL MEDICINE

## 2018-06-27 PROCEDURE — 87077 CULTURE AEROBIC IDENTIFY: CPT | Performed by: INTERNAL MEDICINE

## 2018-06-27 PROCEDURE — 74011250636 HC RX REV CODE- 250/636

## 2018-06-27 PROCEDURE — 74011250636 HC RX REV CODE- 250/636: Performed by: INTERNAL MEDICINE

## 2018-06-27 PROCEDURE — 76040000019: Performed by: INTERNAL MEDICINE

## 2018-06-27 PROCEDURE — 74011000250 HC RX REV CODE- 250

## 2018-06-27 PROCEDURE — 76060000031 HC ANESTHESIA FIRST 0.5 HR: Performed by: INTERNAL MEDICINE

## 2018-06-27 RX ORDER — SODIUM CHLORIDE 0.9 % (FLUSH) 0.9 %
5-10 SYRINGE (ML) INJECTION EVERY 8 HOURS
Status: DISCONTINUED | OUTPATIENT
Start: 2018-06-27 | End: 2018-06-27 | Stop reason: HOSPADM

## 2018-06-27 RX ORDER — ATROPINE SULFATE 0.1 MG/ML
0.5 INJECTION INTRAVENOUS
Status: DISCONTINUED | OUTPATIENT
Start: 2018-06-27 | End: 2018-06-27 | Stop reason: HOSPADM

## 2018-06-27 RX ORDER — SODIUM CHLORIDE 0.9 % (FLUSH) 0.9 %
5-10 SYRINGE (ML) INJECTION AS NEEDED
Status: DISCONTINUED | OUTPATIENT
Start: 2018-06-27 | End: 2018-06-27 | Stop reason: HOSPADM

## 2018-06-27 RX ORDER — LIDOCAINE HYDROCHLORIDE 20 MG/ML
INJECTION, SOLUTION EPIDURAL; INFILTRATION; INTRACAUDAL; PERINEURAL AS NEEDED
Status: DISCONTINUED | OUTPATIENT
Start: 2018-06-27 | End: 2018-06-27 | Stop reason: HOSPADM

## 2018-06-27 RX ORDER — FENTANYL CITRATE 50 UG/ML
25 INJECTION, SOLUTION INTRAMUSCULAR; INTRAVENOUS
Status: DISCONTINUED | OUTPATIENT
Start: 2018-06-27 | End: 2018-06-27 | Stop reason: HOSPADM

## 2018-06-27 RX ORDER — EPINEPHRINE 0.1 MG/ML
1 INJECTION INTRACARDIAC; INTRAVENOUS
Status: DISCONTINUED | OUTPATIENT
Start: 2018-06-27 | End: 2018-06-27 | Stop reason: HOSPADM

## 2018-06-27 RX ORDER — GABAPENTIN 100 MG/1
100 CAPSULE ORAL 3 TIMES DAILY
COMMUNITY
End: 2019-06-29

## 2018-06-27 RX ORDER — PROPOFOL 10 MG/ML
INJECTION, EMULSION INTRAVENOUS AS NEEDED
Status: DISCONTINUED | OUTPATIENT
Start: 2018-06-27 | End: 2018-06-27 | Stop reason: HOSPADM

## 2018-06-27 RX ORDER — ZOLPIDEM TARTRATE 6.25 MG/1
6.25 TABLET, FILM COATED, EXTENDED RELEASE ORAL
COMMUNITY
End: 2019-11-04

## 2018-06-27 RX ORDER — TIMOLOL MALEATE 5 MG/ML
1 SOLUTION/ DROPS OPHTHALMIC 2 TIMES DAILY
COMMUNITY
End: 2019-11-04

## 2018-06-27 RX ORDER — FLUMAZENIL 0.1 MG/ML
0.2 INJECTION INTRAVENOUS
Status: DISCONTINUED | OUTPATIENT
Start: 2018-06-27 | End: 2018-06-27 | Stop reason: HOSPADM

## 2018-06-27 RX ORDER — SODIUM CHLORIDE 9 MG/ML
100 INJECTION, SOLUTION INTRAVENOUS CONTINUOUS
Status: DISCONTINUED | OUTPATIENT
Start: 2018-06-27 | End: 2018-06-27 | Stop reason: HOSPADM

## 2018-06-27 RX ORDER — MIDAZOLAM HYDROCHLORIDE 1 MG/ML
1-2 INJECTION, SOLUTION INTRAMUSCULAR; INTRAVENOUS
Status: DISCONTINUED | OUTPATIENT
Start: 2018-06-27 | End: 2018-06-27 | Stop reason: HOSPADM

## 2018-06-27 RX ADMIN — PROPOFOL 100 MG: 10 INJECTION, EMULSION INTRAVENOUS at 11:44

## 2018-06-27 RX ADMIN — LIDOCAINE HYDROCHLORIDE 100 MG: 20 INJECTION, SOLUTION EPIDURAL; INFILTRATION; INTRACAUDAL; PERINEURAL at 11:44

## 2018-06-27 RX ADMIN — PROPOFOL 50 MG: 10 INJECTION, EMULSION INTRAVENOUS at 11:50

## 2018-06-27 RX ADMIN — SODIUM CHLORIDE 100 ML/HR: 900 INJECTION, SOLUTION INTRAVENOUS at 11:37

## 2018-06-27 RX ADMIN — PROPOFOL 50 MG: 10 INJECTION, EMULSION INTRAVENOUS at 11:47

## 2018-06-27 NOTE — IP AVS SNAPSHOT
[address] [facility]. 
[phone] Patient: Griffin Mckeon 
MRN: [id number] ZCA:[id number] About your hospitalization You were admitted on:  June 27, 2018 You last received care in the:  [facility] ENDOSCOPY You were discharged on:  June 27, 2018 Why you were hospitalized Your primary diagnosis was:  Not on File Follow-up Information Follow up With Details Comments Contact Info MD Philip Li [address] [facility] 83. [phone] Discharge Orders None A check flip indicates which time of day the medication should be taken. My Medications CONTINUE taking these medications Instructions Each Dose to Equal  
 Morning Noon Evening Bedtime Azelastine 0.15 % (205.5 mcg) nasal spray Commonly known as:  ASTEPRO Your last dose was: Your next dose is:    
   
   
 two (2) times a day. CALCIUM 600 WITH VITAMIN D3 600 mg(1,500mg) -400 unit Cap Generic drug:  Calcium-Cholecalciferol (D3) Your last dose was: Your next dose is: Take 2 Caps by mouth daily. 2 Cap  
    
   
   
   
  
 fish oil-omega-3 fatty acids 340-1,000 mg capsule Your last dose was: Your next dose is: Take 1 Cap by mouth daily. 1 Cap  
    
   
   
   
  
 flecainide 50 mg tablet Commonly known as:  TAMBOCOR Your last dose was: Your next dose is: Take 50 mg by mouth two (2) times a day. 50 mg  
    
   
   
   
  
 gabapentin 100 mg capsule Commonly known as:  NEURONTIN Your last dose was: Your next dose is: Take 100 mg by mouth three (3) times daily. 100 mg  
    
   
   
   
  
 multivitamin tablet Commonly known as:  ONE A DAY Your last dose was: Your next dose is: Take 1 Tab by mouth daily. 1 Tab PREMARIN 0.625 mg/gram vaginal cream  
Generic drug:  conjugated estrogens Your last dose was: Your next dose is: Insert 0.5 g into vagina daily. 0.5 g PROTONIX 40 mg tablet Generic drug:  pantoprazole Your last dose was: Your next dose is: Take 40 mg by mouth daily. 40 mg RHINOCORT AQUA 32 mcg/actuation nasal spray Generic drug:  budesonide Your last dose was: Your next dose is:    
   
   
      
   
   
   
  
 sertraline 50 mg tablet Commonly known as:  ZOLOFT Your last dose was: Your next dose is: Take  by mouth daily. timolol 0.5 % ophthalmic solution Commonly known as:  TIMOPTIC Your last dose was: Your next dose is:    
   
   
 Administer 1 Drop to left eye two (2) times a day. 1 Drop  
    
   
   
   
  
 zolpidem CR 6.25 mg tablet Commonly known as:  AMBIEN CR Your last dose was: Your next dose is: Take 6.25 mg by mouth nightly as needed for Sleep. Takes 1/2 pill 6.25 mg  
    
   
   
   
  
  
STOP taking these medications ELIQUIS 5 mg tablet Generic drug:  apixaban Discharge Instructions Tita Pineda MD 
[facility], [address] [place], [place] [phone] 
[email or web address] 
 
Patricia Swain 
[id number] 
1955 EGD DISCHARGE INSTRUCTIONS Discomfort: 
Sore throat- throat lozenges or warm salt water gargle 
redness at IV site- apply warm compress to area; if redness or soreness persist- contact your physician Gaseous discomfort- walking, belching will help relieve any discomfort You may not operate a vehicle for 12 hours You may not engage in an occupation involving machinery or appliances for rest of today You may not drink alcoholic beverages for at least 12 hours Avoid making any critical decisions for at least 24 hour DIET You may have anything by mouth. You may eat and drink immediately. You may resume your regular diet  however -  remember your colon is empty and a heavy meal will produce gas. Avoid these foods:  vegetables, fried / greasy foods, carbonated drinks ACTIVITY You may resume your normal daily activities Spend the remainder of the day resting -  avoid any strenuous activity. CALL M.D. ANY SIGN OF Increasing pain, nausea, vomiting Abdominal distension (swelling) New increased bleeding (oral or rectal) Fever (chills) Pain in chest area Bloody discharge from nose or mouth Shortness of breath Follow-up Instructions: 
 Call Dr. Drake Key for any questions or problems. Telephone # [phone] Dr. Vences Ka office will notify you of the biopsy results available  Within 7 to 10 days. We will call you or send a letter May restart Eliquis on Friday. May take celebrex if needed. Cuca Doyle ENDOSCOPY FINDINGS: 
 Your endoscopy showed normal mucosa with no inflammation. Top sphincter area of the esophagus felt snug. Dilated the entire esophagus. DISCHARGE SUMMARY from Nurse The following personal items collected during your admission are returned to you:  
Dental Appliance: Dental Appliances: None Vision: Visual Aid: Glasses, With patient Hearing Aid:   
Jewelry:   
Clothing:   
Other Valuables:   
Valuables sent to safe:   
 
  
  
  
Introducing [facility]! Dear Kassi Adrian: Thank you for requesting a GroupZoom account. Our records indicate that you already have an active GroupZoom account. You can access your account anytime at [email or web address]. On-Q-ity/[employer] Did you know that you can access your hospital and ER discharge instructions at any time in GroupZoom? You can also review all of your test results from your hospital stay or ER visit. Additional Information If you have questions, please visit the Frequently Asked Questions section of the MyChart website at [email or web address] Remember, Nanoledget is NOT to be used for urgent needs. For medical emergencies, dial 911. Now available from your iPhone and Android! Introducing Jacobo Taylor As a [facility] patient, I wanted to make you aware of our electronic visit tool called Jacobo Taylor. [employer] allows you to connect within minutes with a medical provider 24 hours a day, seven days a week via a mobile device or tablet or logging into a secure website from your computer. You can access Jacobo Taylor from anywhere in the [place]. A virtual visit might be right for you when you have a simple condition and feel like you just dont want to get out of bed, or cant get away from work for an appointment, when your regular [facility] provider is not available (evenings, weekends or holidays), or when youre out of town and need minor care. Electronic visits cost only $49 and if the [employer] provider determines a prescription is needed to treat your condition, one can be electronically transmitted to a nearby pharmacy*. Please take a moment to enroll today if you have not already done so. The enrollment process is free and takes just a few minutes. To enroll, please download the BigSwerve godwin to your tablet or phone, or visit [email or web address] to enroll on your computer. And, as an [address] [place] [address] patient with a Authentium account, the results of your visits will be scanned into your electronic medical record and your primary care provider will be able to view the scanned results. We urge you to continue to see your regular [facility] provider for your ongoing medical care.   And while your primary care provider may not be the one available when you seek a Jacobo Adriarosa elena virtual visit, the peace of mind you get from getting a real diagnosis real time can be priceless. For more information on Jacobo Fernandezfin, view our Frequently Asked Questions (FAQs) at [email or web address]. Sincerely, 
 
Linh Hammer MD 
Chief Medical Officer Enrrique Fairbanks *:  certain medications cannot be prescribed via Jacobo Taylor Providers Seen During Your Hospitalization Provider Specialty Primary office phone Theresa Rcohe MD Gastroenterology [phone] Your Primary Care Physician (PCP) Primary Care Physician Office Phone Office Fax Christiano Bhat [phone] You are allergic to the following Allergen Reactions Latex Rash Adhesive Tape-Silicones Rash Erythromycin Rash Neomycin Rash Nickel Rash  
    
 Nsaids (Non-Steroidal Anti-Inflammatory Drug) Other (comments) Bleeding ulcers Recent Documentation Height Weight Breastfeeding? BMI OB Status Smoking Status 1.6 m 68.7 kg No 26.81 kg/m2 Hysterectomy Never Smoker Emergency Contacts Name Discharge Info Relation Home Work Mobile [address] CAREGIVER [3] Spouse [3]   [phone] Patient Belongings The following personal items are in your possession at time of discharge: 
  Dental Appliances: None  Visual Aid: Glasses, With patient Please provide this summary of care documentation to your next provider. Signatures-by signing, you are acknowledging that this After Visit Summary has been reviewed with you and you have received a copy. Patient Signature:  ____________________________________________________________ Date:  ____________________________________________________________  
  
Gwendloyn Finger  Provider Signature: ____________________________________________________________ Date:  ____________________________________________________________

## 2018-06-27 NOTE — ROUTINE PROCESS
Marco Antonio Chang  1955  [id number]    Situation:  Verbal report received from: Patricia Coffman RN  Procedure: Procedure(s):  ESOPHAGOGASTRODUODENOSCOPY (EGD)  ESOPHAGOGASTRODUODENAL (EGD) BIOPSY  ESOPHAGEAL DILATION    Background:    Preoperative diagnosis: DYSPHAGIA, HISTORY GASTRIC ULCER, AFIB  Postoperative diagnosis: esophagitis    :  Dr. Pranay Ortega  Assistant(s): Endoscopy Technician-1: Esha Reveles  Endoscopy RN-1: Liyah Smart RN    Specimens:   ID Type Source Tests Collected by Time Destination   1 : g e junction biopsy Preservative   Marky Velazquez MD 6/27/2018 1156 Pathology   2 : mid esophagus biopsy Preservative Esophagus, Mid  Marky Velazquez MD 6/27/2018 1157 Pathology     H. Pylori  yes    Assessment:  Intra-procedure medications     Anesthesia gave intra-procedure sedation and medications, see anesthesia flow sheet     Intravenous fluids: NS@ KVO     Vital signs stable     Abdominal assessment: round and soft     Recommendation:  Discharge patient per MD order  Family or Friend   Permission to share finding with family or friend yes

## 2018-06-27 NOTE — ANESTHESIA POSTPROCEDURE EVALUATION
Post-Anesthesia Evaluation and Assessment    Patient: Heather Viera MRN: [id number]  SSN: [id number]    YOB: 1955  Age: 70 y.o. Sex: female       Cardiovascular Function/Vital Signs  Visit Vitals    /56    Pulse (!) 51    Temp 36.4 °C (97.5 °F)    Resp 19    Ht 5' 3\" (1.6 m)    Wt 68.7 kg (151 lb 6 oz)    SpO2 100%    Breastfeeding No    BMI 26.81 kg/m2       Patient is status post total IV anesthesia, MAC anesthesia for Procedure(s):  ESOPHAGOGASTRODUODENOSCOPY (EGD)  ESOPHAGOGASTRODUODENAL (EGD) BIOPSY  ESOPHAGEAL DILATION. Nausea/Vomiting: None    Postoperative hydration reviewed and adequate. Pain:  Pain Scale 1: Visual (06/27/18 1215)  Pain Intensity 1: 0 (06/27/18 1209)   Managed    Neurological Status: At baseline    Mental Status and Level of Consciousness: Arousable    Pulmonary Status:   O2 Device: Room air (06/27/18 1215)   Adequate oxygenation and airway patent    Complications related to anesthesia: None    Post-anesthesia assessment completed.  No concerns    Signed By: Mariella Gibson MD     June 27, 2018

## 2018-06-27 NOTE — PROCEDURES
Norm Mckeonesa                   Endoscopic Gastroduodenoscopy Procedure Note      6/27/2018  Yvonne Umaña  1955  [id number]    Procedure: Endoscopic Gastroduodenoscopy with biopsy, esophageal dilation    Indication: dysphagia, Hx of PUD     Pre-operative Diagnosis: see indication above    Post-operative Diagnosis: see findings below    : CLEMENTE Murphy MD    Referring Provider:  Ramona Leong MD      Anesthesia/Sedation:  MAC anesthesia Propofol    Airway assessment: No airway problems anticipated    Pre-Procedural Exam:      Airway: clear, no airway problems anticipated  Heart: RRR, without gallops or rubs  Lungs: clear bilaterally without wheezes, crackles, or rhonchi  Abdomen: soft, nontender, nondistended, bowel sounds present  Mental Status: awake, alert and oriented to person, place and time       Procedure Details     After infomed consent was obtained for the procedure, with all risks and benefits of procedure explained the patient was taken to the endoscopy suite and placed in the left lateral decubitus position. Following sequential administration of sedation as per above, the endoscope was inserted into the mouth and advanced under direct vision to second portion of the duodenum. A careful inspection was made as the gastroscope was withdrawn, including a retroflexed view of the proximal stomach; findings and interventions are described below. Findings:   Esophagus: normal mucosa. Biopsied mid then GE junction. Dilated whole esophagus with 54 Fr villafuerte dilator. Felt \"snug\" in region of UES  Stomach: normal . Biopsied for pyloritek. Duodenum: normal    Therapies:  esophageal dilation with 47 Fr villafuerte  biopsy of esophagus    Specimens: 1. Biopsies of GE junction  2. Biopsies of mid esophagus           Complications:   None; patient tolerated the procedure well. EBL:  None.             Impression:      -Normal mucosa throughout.  -Empiric esophageal dilation    Recommendations:  -Await pathology. , -Follow symptoms. , -OK to start celebrex    Theresa Hill., MD6/27/2018

## 2018-06-27 NOTE — PERIOP NOTES
Anesthesia reports 200mg Propofol, 100mg Lidocaine and 300mL NS given during procedure. Received report from anesthesia staff on vital signs and status of patient.

## 2018-06-27 NOTE — DISCHARGE INSTRUCTIONS
Emery Alva MD  [facility], [address]  [place], [address] [facility]  [phone]  [email or web address]    Luisa Cole  [id number]  1955    EGD DISCHARGE INSTRUCTIONS    Discomfort:  Sore throat- throat lozenges or warm salt water gargle  redness at IV site- apply warm compress to area; if redness or soreness persist- contact your physician  Gaseous discomfort- walking, belching will help relieve any discomfort  You may not operate a vehicle for 12 hours  You may not engage in an occupation involving machinery or appliances for rest of today  You may not drink alcoholic beverages for at least 12 hours  Avoid making any critical decisions for at least 24 hour  DIET  You may have anything by mouth. You may eat and drink immediately. You may resume your regular diet - however -  remember your colon is empty and a heavy meal will produce gas. Avoid these foods:  vegetables, fried / greasy foods, carbonated drinks      ACTIVITY  You may resume your normal daily activities   Spend the remainder of the day resting -  avoid any strenuous activity. CALL M.D. ANY SIGN OF   Increasing pain, nausea, vomiting  Abdominal distension (swelling)  New increased bleeding (oral or rectal)  Fever (chills)  Pain in chest area  Bloody discharge from nose or mouth  Shortness of breath    Follow-up Instructions:   Call Dr. Emery Alva for any questions or problems. Telephone # [phone]  Dr. Li Salgado office will notify you of the biopsy results available  Within 7 to 10 days. We will call you or send a letter      May restart Eliquis on Friday. May take celebrex if needed. James Garza ENDOSCOPY FINDINGS:   Your endoscopy showed normal mucosa with no inflammation. Top sphincter area of the esophagus felt snug. Dilated the entire esophagus.           DISCHARGE SUMMARY from Nurse    The following personal items collected during your admission are returned to you:   Dental Appliance: Dental Appliances: None  Vision: Visual Aid: Glasses, With patient  Hearing Aid:    Jewelry:    Clothing:    Other Valuables:    Valuables sent to safe:

## 2018-06-27 NOTE — ANESTHESIA PREPROCEDURE EVALUATION
Anesthetic History     PONV          Review of Systems / Medical History  Patient summary reviewed, nursing notes reviewed and pertinent labs reviewed    Pulmonary  Within defined limits                 Neuro/Psych         Psychiatric history     Cardiovascular            Dysrhythmias : SVT and atrial fibrillation      Exercise tolerance: >4 METS     GI/Hepatic/Renal     GERD      PUD     Endo/Other        Arthritis     Other Findings              Physical Exam    Airway  Mallampati: II  TM Distance: 4 - 6 cm  Neck ROM: normal range of motion   Mouth opening: Normal     Cardiovascular  Regular rate and rhythm,  S1 and S2 normal,  no murmur, click, rub, or gallop             Dental  No notable dental hx       Pulmonary  Breath sounds clear to auscultation               Abdominal  GI exam deferred       Other Findings            Anesthetic Plan    ASA: 2  Anesthesia type: total IV anesthesia and MAC          Induction: Intravenous  Anesthetic plan and risks discussed with: Patient

## 2018-09-21 ENCOUNTER — HOSPITAL ENCOUNTER (OUTPATIENT)
Dept: MAMMOGRAPHY | Age: 63
Discharge: HOME | End: 2018-09-21
Attending: INTERNAL MEDICINE
Payer: MEDICARE

## 2018-09-21 DIAGNOSIS — Z12.31 VISIT FOR SCREENING MAMMOGRAM: ICD-10-CM

## 2018-09-21 PROCEDURE — 77067 SCR MAMMO BI INCL CAD: CPT

## 2019-06-29 ENCOUNTER — OFFICE VISIT (OUTPATIENT)
Dept: URGENT CARE | Age: 64
End: 2019-06-29

## 2019-06-29 VITALS
SYSTOLIC BLOOD PRESSURE: 141 MMHG | HEIGHT: 63 IN | HEART RATE: 64 BPM | DIASTOLIC BLOOD PRESSURE: 63 MMHG | OXYGEN SATURATION: 98 % | TEMPERATURE: 98.6 F | WEIGHT: 159 LBS | BODY MASS INDEX: 28.17 KG/M2 | RESPIRATION RATE: 16 BRPM

## 2019-06-29 DIAGNOSIS — L03.116 CELLULITIS OF LEFT LOWER EXTREMITY: Primary | ICD-10-CM

## 2019-06-29 RX ORDER — PREDNISOLONE ACETATE 10 MG/ML
1 SUSPENSION/ DROPS OPHTHALMIC 4 TIMES DAILY
COMMUNITY

## 2019-06-29 RX ORDER — ALENDRONATE SODIUM 70 MG/1
TABLET ORAL
COMMUNITY

## 2019-06-29 RX ORDER — CEPHALEXIN 500 MG/1
500 CAPSULE ORAL 3 TIMES DAILY
Qty: 21 CAP | Refills: 0 | Status: SHIPPED | OUTPATIENT
Start: 2019-06-29 | End: 2019-07-06

## 2019-06-29 NOTE — PATIENT INSTRUCTIONS

## 2019-06-29 NOTE — PROGRESS NOTES
The history is provided by the patient. Rash    This is a new problem. The current episode started yesterday. The problem has been gradually worsening. There has been no fever. The rash is present on the left lower leg. The patient is experiencing no pain. Pertinent negatives include no blisters, no itching, no pain, no weeping and no hives. She has tried nothing for the symptoms. The treatment provided no relief. Got several insect bites on her legs a couple days ago. Scratched them. Yesterday developed area of redness, warmth, discomfort. Area grew bigger today. No f/c/n/v. No hx of MRSA. No recent hospitalization or surgery in past several months.     Past Medical History:   Diagnosis Date    Depression     GERD (gastroesophageal reflux disease)     H/O hammer toe correction     Nausea & vomiting     Osteoarthritis     Psychiatric disorder     anxiety, seasonal affective disorder    PUD (peptic ulcer disease)     SVT (supraventricular tachycardia) ([facility])         Past Surgical History:   Procedure Laterality Date    DILATE ESOPHAGUS  6/27/2018         HX BREAST BIOPSY Right     pt was 19 or 20 negative    HX CATARACT REMOVAL      bilateral    HX GYN      HX HEENT      L pupil does not react; h/o bilateral retina surgery    HX HYSTERECTOMY      HX KNEE REPLACEMENT Left     HX ORTHOPAEDIC      left thumb surgery    HX ORTHOPAEDIC      right hammer toe repair with screw    HX RETINAL DETACHMENT REPAIR      bilateral     OH EGD TRANSORAL CONTROL BLEEDING ANY METHOD  10/2/2013         UPPER GI ENDOSCOPY,BIOPSY  6/27/2018              Family History   Problem Relation Age of Onset    Heart Disease Mother     Kidney Disease Mother     Heart Disease Father     Hypertension Father         Social History     Socioeconomic History    Marital status:      Spouse name: Not on file    Number of children: Not on file    Years of education: Not on file    Highest education level: Not on file   Occupational History    Not on file   Social Needs    Financial resource strain: Not on file    Food insecurity:     Worry: Not on file     Inability: Not on file    Transportation needs:     Medical: Not on file     Non-medical: Not on file   Tobacco Use    Smoking status: Never Smoker    Smokeless tobacco: Never Used   Substance and Sexual Activity    Alcohol use: Yes     Alcohol/week: 0.0 oz     Comment: 5 per week    Drug use: Yes     Types: Prescription    Sexual activity: Yes     Partners: Male     Birth control/protection: Surgical   Lifestyle    Physical activity:     Days per week: Not on file     Minutes per session: Not on file    Stress: Not on file   Relationships    Social connections:     Talks on phone: Not on file     Gets together: Not on file     Attends Buddhism service: Not on file     Active member of club or organization: Not on file     Attends meetings of clubs or organizations: Not on file     Relationship status: Not on file    Intimate partner violence:     Fear of current or ex partner: Not on file     Emotionally abused: Not on file     Physically abused: Not on file     Forced sexual activity: Not on file   Other Topics Concern    Not on file   Social History Narrative    Not on file                ALLERGIES: Latex; Adhesive tape-silicones; Erythromycin; Neomycin; Nickel; and Nsaids (non-steroidal anti-inflammatory drug)    Review of Systems   Constitutional: Negative for chills and fever. Skin: Positive for color change and rash. Negative for itching. Vitals:    06/29/19 1416   BP: 141/63   Pulse: 64   Resp: 16   Temp: 98.6 °F (37 °C)   SpO2: 98%   Weight: 159 lb (72.1 kg)   Height: 5' 3\" (1.6 m)       Physical Exam   Constitutional: She is oriented to person, place, and time. She appears well-developed and well-nourished. No distress. HENT:   Head: Normocephalic and atraumatic.    Eyes: Conjunctivae are normal.   Musculoskeletal:   L Knee with well healed surgical scar   Neurological: She is alert and oriented to person, place, and time. Skin: She is not diaphoretic. There is erythema. MDM     Differential Diagnosis; Clinical Impression; Plan:     Cellulitis of left lower leg. No constitutional sxs. No MRSA risk factors. Empiric abx and close monitoring.       Procedures

## 2019-09-23 ENCOUNTER — HOSPITAL ENCOUNTER (OUTPATIENT)
Dept: MAMMOGRAPHY | Age: 64
Discharge: HOME | End: 2019-09-23
Attending: INTERNAL MEDICINE
Payer: MEDICARE

## 2019-09-23 DIAGNOSIS — Z12.31 VISIT FOR SCREENING MAMMOGRAM: ICD-10-CM

## 2019-09-23 PROCEDURE — 77063 BREAST TOMOSYNTHESIS BI: CPT

## 2019-10-10 ENCOUNTER — HOSPITAL ENCOUNTER (OUTPATIENT)
Dept: MRI IMAGING | Age: 64
Discharge: HOME | End: 2019-10-10
Attending: ORTHOPAEDIC SURGERY
Payer: MEDICARE

## 2019-10-10 DIAGNOSIS — M17.11 LOCALIZED OSTEOARTHRITIS OF RIGHT KNEE: ICD-10-CM

## 2019-10-10 PROCEDURE — 73721 MRI JNT OF LWR EXTRE W/O DYE: CPT

## 2019-11-04 ENCOUNTER — HOSPITAL ENCOUNTER (OUTPATIENT)
Dept: PREADMISSION TESTING | Age: 64
Discharge: HOME | End: 2019-11-04
Attending: ORTHOPAEDIC SURGERY
Payer: MEDICARE

## 2019-11-04 VITALS
TEMPERATURE: 98 F | HEIGHT: 63 IN | WEIGHT: 159.83 LBS | BODY MASS INDEX: 28.32 KG/M2 | DIASTOLIC BLOOD PRESSURE: 79 MMHG | OXYGEN SATURATION: 99 % | HEART RATE: 56 BPM | RESPIRATION RATE: 16 BRPM | SYSTOLIC BLOOD PRESSURE: 147 MMHG

## 2019-11-04 LAB
ABO + RH BLD: NORMAL
ALBUMIN SERPL-MCNC: 3.7 G/DL (ref 3.5–5)
ALBUMIN/GLOB SERPL: 1.1 {RATIO} (ref 1.1–2.2)
ALP SERPL-CCNC: 69 U/L (ref 45–117)
ALT SERPL-CCNC: 18 U/L (ref 12–78)
ANION GAP SERPL CALC-SCNC: 6 MMOL/L (ref 5–15)
APPEARANCE UR: CLEAR
AST SERPL-CCNC: 18 U/L (ref 15–37)
BACTERIA URNS QL MICRO: NEGATIVE /HPF
BILIRUB SERPL-MCNC: 0.3 MG/DL (ref 0.2–1)
BILIRUB UR QL: NEGATIVE
BLOOD GROUP ANTIBODIES SERPL: NORMAL
BUN SERPL-MCNC: 11 MG/DL (ref 6–20)
BUN/CREAT SERPL: 13 (ref 12–20)
CALCIUM SERPL-MCNC: 9.2 MG/DL (ref 8.5–10.1)
CHLORIDE SERPL-SCNC: 107 MMOL/L (ref 97–108)
CO2 SERPL-SCNC: 27 MMOL/L (ref 21–32)
COLOR UR: NORMAL
CREAT SERPL-MCNC: 0.82 MG/DL (ref 0.55–1.02)
EPITH CASTS URNS QL MICRO: NORMAL /LPF
ERYTHROCYTE [DISTWIDTH] IN BLOOD BY AUTOMATED COUNT: 13.2 % (ref 11.5–14.5)
EST. AVERAGE GLUCOSE BLD GHB EST-MCNC: 108 MG/DL
GLOBULIN SER CALC-MCNC: 3.4 G/DL (ref 2–4)
GLUCOSE SERPL-MCNC: 88 MG/DL (ref 65–100)
GLUCOSE UR STRIP.AUTO-MCNC: NEGATIVE MG/DL
HBA1C MFR BLD: 5.4 % (ref 4.2–6.3)
HCT VFR BLD AUTO: 40 % (ref 35–47)
HGB BLD-MCNC: 13.1 G/DL (ref 11.5–16)
HGB UR QL STRIP: NEGATIVE
HYALINE CASTS URNS QL MICRO: NORMAL /LPF (ref 0–5)
INR PPP: 1 (ref 0.9–1.1)
KETONES UR QL STRIP.AUTO: NEGATIVE MG/DL
LEUKOCYTE ESTERASE UR QL STRIP.AUTO: NEGATIVE
MCH RBC QN AUTO: 32.1 PG (ref 26–34)
MCHC RBC AUTO-ENTMCNC: 32.8 G/DL (ref 30–36.5)
MCV RBC AUTO: 98 FL (ref 80–99)
NITRITE UR QL STRIP.AUTO: NEGATIVE
NRBC # BLD: 0 K/UL (ref 0–0.01)
NRBC BLD-RTO: 0 PER 100 WBC
PH UR STRIP: 7 [PH] (ref 5–8)
PLATELET # BLD AUTO: 273 K/UL (ref 150–400)
PMV BLD AUTO: 8.9 FL (ref 8.9–12.9)
POTASSIUM SERPL-SCNC: 3.9 MMOL/L (ref 3.5–5.1)
PROT SERPL-MCNC: 7.1 G/DL (ref 6.4–8.2)
PROT UR STRIP-MCNC: NEGATIVE MG/DL
PROTHROMBIN TIME: 10.4 SEC (ref 9–11.1)
RBC # BLD AUTO: 4.08 M/UL (ref 3.8–5.2)
RBC #/AREA URNS HPF: NORMAL /HPF (ref 0–5)
SODIUM SERPL-SCNC: 140 MMOL/L (ref 136–145)
SP GR UR REFRACTOMETRY: 1 (ref 1–1.03)
SPECIMEN EXP DATE BLD: NORMAL
UA: UC IF INDICATED,UAUC: NORMAL
UROBILINOGEN UR QL STRIP.AUTO: 0.2 EU/DL (ref 0.2–1)
WBC # BLD AUTO: 7.1 K/UL (ref 3.6–11)
WBC URNS QL MICRO: NORMAL /HPF (ref 0–4)

## 2019-11-04 PROCEDURE — 86900 BLOOD TYPING SEROLOGIC ABO: CPT

## 2019-11-04 PROCEDURE — 85027 COMPLETE CBC AUTOMATED: CPT

## 2019-11-04 PROCEDURE — 85610 PROTHROMBIN TIME: CPT

## 2019-11-04 PROCEDURE — 81001 URINALYSIS AUTO W/SCOPE: CPT

## 2019-11-04 PROCEDURE — 80053 COMPREHEN METABOLIC PANEL: CPT

## 2019-11-04 PROCEDURE — 83036 HEMOGLOBIN GLYCOSYLATED A1C: CPT

## 2019-11-04 PROCEDURE — 36415 COLL VENOUS BLD VENIPUNCTURE: CPT

## 2019-11-04 RX ORDER — ACETAMINOPHEN 500 MG
1000 TABLET ORAL ONCE
Status: CANCELLED | OUTPATIENT
Start: 2019-11-11 | End: 2019-11-11

## 2019-11-04 RX ORDER — PREGABALIN 150 MG/1
150 CAPSULE ORAL ONCE
Status: CANCELLED | OUTPATIENT
Start: 2019-11-11 | End: 2019-11-11

## 2019-11-04 RX ORDER — ZOLPIDEM TARTRATE 5 MG/1
5 TABLET ORAL
COMMUNITY
End: 2019-11-13

## 2019-11-04 RX ORDER — SODIUM CHLORIDE, SODIUM LACTATE, POTASSIUM CHLORIDE, CALCIUM CHLORIDE 600; 310; 30; 20 MG/100ML; MG/100ML; MG/100ML; MG/100ML
25 INJECTION, SOLUTION INTRAVENOUS CONTINUOUS
Status: CANCELLED | OUTPATIENT
Start: 2019-11-11

## 2019-11-04 RX ORDER — SERTRALINE HYDROCHLORIDE 50 MG/1
50 TABLET, FILM COATED ORAL 2 TIMES DAILY
COMMUNITY

## 2019-11-04 NOTE — PERIOP NOTES
[facility]  Joint/Spine Preoperative Instructions        Surgery Date 11/11/19          Time of Arrival 0730  Contact #:  [phone]    1. On the day of your surgery, please report to the Surgical Services Registration Desk and sign in at your designated time. The Surgery Center is located to the right of the Emergency Room. 2. You must have someone with you to drive you home. You should not drive a car for 24 hours following surgery. Please make arrangements for a friend or family member to stay with you for the first 24 hours after your surgery. 3. No food after midnight 11/10/19. Medications morning of surgery should be taken with a sip of water. Please follow pre-surgery drink instructions that were given at your Pre Admission Testing appointment. 4. We recommend you do not drink any alcoholic beverages for 24 hours before and after your surgery. 5. Contact your surgeons office for instructions on the following medications: non-steroidal anti-inflammatory drugs (i.e. Advil, Aleve), vitamins, and supplements. (Some surgeons will want you to stop these medications prior to surgery and others may allow you to take them)  **If you are currently taking Plavix, Coumadin, Aspirin and/or other blood-thinning agents, contact your surgeon for instructions. ** Your surgeon will partner with the physician prescribing these medications to determine if it is safe to stop or if you need to continue taking. Please do not stop taking these medications without instructions from your surgeon    6. Wear comfortable clothes. Wear glasses instead of contacts. Do not bring any money or jewelry. Please bring picture ID, insurance card, and any prearranged co-payment or hospital payment. Do not wear make-up, particularly mascara the morning of your surgery. Do not wear nail polish, particularly if you are having foot /hand surgery.   Wear your hair loose or down, no ponytails, buns, kylee pins or clips. All body piercings must be removed. Please shower with antibacterial soap for three consecutive days before and on the morning of surgery, but do not apply any lotions, powders or deodorants after the shower on the day of surgery. Please use a fresh towels after each shower. Please sleep in clean clothes and change bed linens the night before surgery. Please do not shave for 48 hours prior to surgery. Shaving of the face is acceptable. 7. You should understand that if you do not follow these instructions your surgery may be cancelled. If your physical condition changes (I.e. fever, cold or flu) please contact your surgeon as soon as possible. 8. It is important that you be on time. If a situation occurs where you may be late, please call [phone] (OR Holding Area). 9. If you have any questions and or problems, please call [phone] (Pre-admission Testing). 10. Your surgery time may be subject to change. You will receive a phone call the evening prior if your time changes. 11.  If having outpatient surgery, you must have someone to drive you here, stay with you during the duration of your stay, and to drive you home at time of discharge. 12.   In an effort to improve the efficiency, privacy, and safety for all of our Pre-op patients visitors are not allowed in the Holding area. Once you arrive and are registered your family/visitors will be asked to remain in the waiting room. The Pre-op staff will get you from the Surgical Waiting Area and will explain to you and your family/visitors that the Pre-op phase is beginning. The staff will answer any questions and provide instructions for tracking of the patient, by use of the existing tracking number and color-coded status board in the waiting room.   At this time the staff will also ask for your designated spokesperson information in the event that the physician or staff need to provide an update or obtain any pertinent information. The designated spokesperson will be notified if the physician needs to speak to family during the pre-operative phase. If at any time your family/visitors has questions or concerns they may approach the volunteer desk in the waiting area for assistance. Special Instructions: Follow your doctors instructions when to stop certain medications such as, Blood Thinners, Aspirin, NSAIDS, Vitamins, Supplements, etc... Follow instructions for pre-surgery drink and skin cleansing/Hibiclens. Follow instructions for practicing Incentive Spirometer and bring to hospital day of surgery. Review your Joint Surgery Handbook. MEDICATIONS TO TAKE THE MORNING OF SURGERY WITH A SIP OF WATER:  Prednisolone Ophthalmic drops, Flecainide, Sertraline, Pantoprazole. I understand a pre-operative phone call will be made to verify my surgery time. In the event that I am not available, I give permission for a message to be left on my answering service and/or with another person?   yes         ___________________        __________   11/4/19  @  1547    (Signature of Patient)             (Witness)                (Date and Time)

## 2019-11-04 NOTE — PERIOP NOTES
Incentive Spirometer        Using the incentive spirometer helps expand the small air sacs of your lungs, helps you breathe deeply, and helps improve your lung function. Use your incentive spirometer twice a day (10 breaths each time) prior to surgery. How to Use Your Incentive Spirometer:  1. Hold the incentive spirometer in an upright position. 2. Breathe out as usual.   3. Place the mouthpiece in your mouth and seal your lips tightly around it. 4. Take a deep breath. Breathe in slowly and as deeply as possible. Keep the blue flow rate guide between the arrows. 5. Hold your breath as long as possible. Then exhale slowly and allow the piston to fall to the bottom of the column. 6. Rest for a few seconds and repeat steps one through five at least 10 times. PAT Tidal Volume______2500____________  x______2__________  Date__11/4/19    Sanjiv Lance THE INCENTIVE SPIROMETER WITH YOU TO THE HOSPITAL ON THE DAY OF YOUR SURGERY. Opportunity given to ask and answer questions as well as to observe return demonstration.     Patient signature_____________________________    Witness____________________________

## 2019-11-04 NOTE — PERIOP NOTES
EKG not repeated during PAT visit, EKG date 6/6/19 received from [facility] on chart. 11/5/19 @ 1300:  Called to [facility] requested latest office notes and EKG to be faxed. Cardiac Notes received from [facility] dated 6/6/19.    11/5/19 @ 1310: LVM for [facility] at Dr. Leopold Campbell office, pt has NSAIDS listed as allergy due to hx of bleeding ulcers, Celebrex PO was ordered pre-op, does Dr. Leopold Campbell still want pt to have pre-op Celebrex with known history? Also, notifying MD of patient Nickel allergy. Faxed the same info to [facility], Fax confirmation received. 11/5/19 @ 1500: Call from [facility] at Dr. Leopold Campbell office, states per Dr. Leopold Campbell ok to give pt Celebrex pre-op as ordered. [facility] states she is aware of Nickel allergy and will be using Titanium.

## 2019-11-04 NOTE — PERIOP NOTES

## 2019-11-04 NOTE — PERIOP NOTES
Preventing Infections Before and After  Your Surgery    IMPORTANT INSTRUCTIONS    Please read and follow these instructions carefully. If you are unable to comply with the below instructions your procedure will be cancelled. Every Night for Three (3) nights before your surgery:  1. Shower with an antibacterial soap, such as Dial, or the soap provided at your preassessment appointment. A shower is better than a bath for cleaning your skin. 2. If needed, ask someone to help you reach all areas of your body. Dont forget to clean your belly button with every shower. The night before your surgery: If you lose your Hibiclens please contact surgery center or you can purchase it at a local pharmacy  1. On the night before your surgery, shower with an antibacterial soap, such as Dial, or the soap provided at your preassessment appointment. 2. With one packet of Hibiclens in hand, turn water off.  3. Apply Hibiclens antiseptic skin cleanser with a clean, freshly washed washcloth. ? Gently apply to your body from chin to toes (except the genital area) and especially the area(s) where your incision(s) will be. ? Leave Hibiclens on your skin for at least 20 seconds. CAUTION: If needed, Hibiclens may be used to clean the folds of skin of the legs (such as in the area of the groin) and on your buttocks and hips. However, do not use Hibiclens above the neck or in the genital area (your bottom) or put inside any area of your body. 4. Turn the water back on and rinse. 5. Dry gently with a clean, freshly washed towel. 6. After your shower, do not use any powder, deodorant, perfumes or lotion. 7. Use clean, freshly washed towels and washcloths every time you shower. 8. Wear clean, freshly washed pajamas to bed the night before surgery. 9. Sleep on clean, freshly washed sheets. 10. Do not allow pets to sleep in your bed with you. The Morning of your surgery:  1.  Shower again thoroughly with an antibacterial soap, such as Dial or the soap provided at your preassessment appointment. If needed, ask someone for help to reach all areas of your body. Dont forget to clean your belly button! Rinse. 2. Dry gently with a clean, freshly washed towel. 3. After your shower, do not use any powder, deodorant, perfumes or lotion prior to surgery. 4. Put on clean, freshly washed clothing. Tips to help prevent infections after your surgery:  1. Protect your surgical wound from germs:  ? Hand washing is the most important thing you and your caregivers can do to prevent infections. ? Keep your bandage clean and dry! ? Do not touch your surgical wound. 2. Use clean, freshly washed towels and washcloths every time you shower; do not share bath linens with others. 3. Until your surgical wound is healed, wear clothing and sleep on bed linens each day that are clean and freshly washed. 4. Do not allow pets to sleep in your bed with you or touch your surgical wound. 5. Do not smoke  smoking delays wound healing. This may be a good time to stop smoking. 6. If you have diabetes, it is important for you to manage your blood sugar levels properly before your surgery as well as after your surgery. Poorly managed blood sugar levels slow down wound healing and prevent you from healing completely. If you lose your Hibiclens, please call the [facility], or it is available for purchase at your pharmacy.                ___________________      ___________________      11/4/19  @  1233  (Signature of Patient)          (Witness)                   (Date and Time)

## 2019-11-05 LAB
BACTERIA SPEC CULT: NORMAL
BACTERIA SPEC CULT: NORMAL
SERVICE CMNT-IMP: NORMAL

## 2019-11-05 RX ORDER — CELECOXIB 200 MG/1
400 CAPSULE ORAL ONCE
Status: CANCELLED | OUTPATIENT
Start: 2019-11-11 | End: 2019-11-11

## 2019-11-06 NOTE — ADVANCED PRACTICE NURSE
PAT Nurse Practitioner   Pre-Operative Chart Review/Assessment:-ORTHOPEDIC/NEUROSURGICAL SPINE                Patient Name:  Usman Longo                                                         Age:   75 y.o.    :  1955     Today's Date:  2019     Date of PAT:   19      Date of Surgery:    19      Procedure(s):  Right total knee arthroplasty     Surgeon:   Luis Enrique Mckeon     Medical Clearance:  Dr. Naresh Ewing                   PLAN:      1)  Cardiac Clearance:  Dr. Yovana Castillos 10/31/19       2)  Diabetic Treatment Consult:  Not indicated-A1C 5.4      3)  Sleep Apnea evaluation:   Not indicated-JENNY 1      4) Treatment for MRSA/Staph Aureus:  Negative      5) Additional Concerns:  SVT s/p ablation, N/V, PUD, depression                Vital Signs:         Vitals:    19 1114   BP: 147/79   Pulse: (!) 56   Resp: 16   Temp: 98 °F (36.7 °C)   SpO2: 99%   Weight: 72.5 kg (159 lb 13.3 oz)   Height: 5' 3\" (1.6 m)            ____________________________________________  PAST MEDICAL HISTORY  Past Medical History:   Diagnosis Date    Coagulation disorder ([facility] [address]     takes Eliquis    Depression     GERD (gastroesophageal reflux disease)     H/O hammer toe correction     Nausea & vomiting     Osteoarthritis     knee, thumb    Osteoporosis     Psychiatric disorder     anxiety, seasonal affective disorder    PUD (peptic ulcer disease)     required blood transfusion    SVT (supraventricular tachycardia) ([facility] [address]     Afib takes Eliquis      ____________________________________________  PAST SURGICAL HISTORY  Past Surgical History:   Procedure Laterality Date    CARDIAC SURG PROCEDURE UNLIST  2017    Cardiac Ablation    DILATE ESOPHAGUS  2018         HX BREAST BIOPSY Right     pt was 21or 19years old negative    HX CATARACT REMOVAL      bilateral    HX GYN      HX HEENT      L pupil does not react; h/o bilateral retina surgery/detachment    HX HYSTERECTOMY      HX KNEE REPLACEMENT Left  HX ORTHOPAEDIC Bilateral     left thumb surgery due to OA    HX ORTHOPAEDIC Right     right hammer toe repair with screw    HX RETINAL DETACHMENT REPAIR      bilateral     VT EGD TRANSORAL CONTROL BLEEDING ANY METHOD  10/2/2013         UPPER GI ENDOSCOPY,BIOPSY  6/27/2018           ____________________________________________  HOME MEDICATIONS    Current Outpatient Medications   Medication Sig    folic acid/multivit-min/lutein (SPECTRAVITE ADULT 50 PLUS,LUT, PO) Take 1 Tab by mouth daily.  zolpidem (AMBIEN) 5 mg tablet Take 5 mg by mouth nightly as needed for Sleep. 1/2 tab at night as needed    folic acid/multivit-min/lutein (SPECTRAVITE ADULT 50 PLUS,LUT, PO) Take 1 Tab by mouth daily.  sertraline (ZOLOFT) 50 mg tablet Take 50 mg by mouth two (2) times a day.  prednisoLONE acetate (PRED FORTE) 1 % ophthalmic suspension Administer 1 Drop to both eyes four (4) times daily.  alendronate (FOSAMAX) 70 mg tablet Take  by mouth every seven (7) days.  fish oil-omega-3 fatty acids 340-1,000 mg capsule Take 1 Cap by mouth daily.  pantoprazole (PROTONIX) 40 mg tablet Take 40 mg by mouth daily.  flecainide (TAMBOCOR) 50 mg tablet Take 50 mg by mouth two (2) times a day.  Calcium-Cholecalciferol, D3, (CALCIUM 600 WITH VITAMIN D3) 600 mg(1,500mg) -400 unit cap Take 2 Caps by mouth daily.  apixaban (ELIQUIS PO) Take 5 mg by mouth two (2) times a day.  conjugated estrogens (PREMARIN) 0.625 mg/gram vaginal cream Insert 0.5 g into vagina daily.      No current facility-administered medications for this encounter.       ____________________________________________  ALLERGIES  Allergies   Allergen Reactions    Latex Rash    Adhesive Tape-Silicones Rash    Erythromycin Rash    Neomycin Rash    Nickel Rash    Nsaids (Non-Steroidal Anti-Inflammatory Drug) Other (comments)     Bleeding ulcers       ____________________________________________  SOCIAL HISTORY  Social History     Tobacco Use    Smoking status: Never Smoker    Smokeless tobacco: Never Used   Substance Use Topics    Alcohol use: Yes     Alcohol/week: 5.0 standard drinks     Types: 5 Glasses of wine per week     Comment: 5 per week      ____________________________________________        Labs:   Results for Marissa Bryan \"REINALDO\" (MRN [id number]) as of 11/6/2019 14:28   Ref.  Range 11/4/2019 11:01   WBC Latest Ref Range: 3.6 - 11.0 K/uL 7.1   NRBC Latest Ref Range: 0  WBC 0.0   RBC Latest Ref Range: 3.80 - 5.20 M/uL 4.08   HGB Latest Ref Range: 11.5 - 16.0 g/dL 13.1   HCT Latest Ref Range: 35.0 - 47.0 % 40.0   MCV Latest Ref Range: 80.0 - 99.0 FL 98.0   MCH Latest Ref Range: 26.0 - 34.0 PG 32.1   MCHC Latest Ref Range: 30.0 - 36.5 g/dL 32.8   RDW Latest Ref Range: 11.5 - 14.5 % 13.2   PLATELET Latest Ref Range: 150 - 400 K/uL 273   MPV Latest Ref Range: 8.9 - 12.9 FL 8.9   ABSOLUTE NRBC Latest Ref Range: 0.00 - 0.01 K/uL 0.00   Color Latest Units:   YELLOW/STRAW   Appearance Latest Ref Range: CLEAR   CLEAR   Specific gravity Latest Ref Range: 1.003 - 1.030   1.005   pH (UA) Latest Ref Range: 5.0 - 8.0   7.0   Protein Latest Ref Range: NEG mg/dL NEGATIVE   Glucose Latest Ref Range: NEG mg/dL NEGATIVE   Ketone Latest Ref Range: NEG mg/dL NEGATIVE   Blood Latest Ref Range: NEG   NEGATIVE   Bilirubin Latest Ref Range: NEG   NEGATIVE   Urobilinogen Latest Ref Range: 0.2 - 1.0 EU/dL 0.2   Nitrites Latest Ref Range: NEG   NEGATIVE   Leukocyte Esterase Latest Ref Range: NEG   NEGATIVE   Epithelial cells Latest Ref Range: FEW /lpf FEW   WBC Latest Ref Range: 0 - 4 /hpf 0-4   RBC Latest Ref Range: 0 - 5 /hpf 0-5   Bacteria Latest Ref Range: NEG /hpf NEGATIVE   Hyaline cast Latest Ref Range: 0 - 5 /lpf 0-2   INR Latest Ref Range: 0.9 - 1.1   1.0   Prothrombin time Latest Ref Range: 9.0 - 11.1 sec 10.4   Sodium Latest Ref Range: 136 - 145 mmol/L 140   Potassium Latest Ref Range: 3.5 - 5.1 mmol/L 3.9   Chloride Latest Ref Range: 97 - 108 mmol/L 107   CO2 Latest Ref Range: 21 - 32 mmol/L 27   Anion gap Latest Ref Range: 5 - 15 mmol/L 6   Glucose Latest Ref Range: 65 - 100 mg/dL 88   BUN Latest Ref Range: 6 - 20 MG/DL 11   Creatinine Latest Ref Range: 0.55 - 1.02 MG/DL 0.82   BUN/Creatinine ratio Latest Ref Range: 12 - 20   13   Calcium Latest Ref Range: 8.5 - 10.1 MG/DL 9.2   GFR est non-AA Latest Ref Range: >60 ml/min/1.73m2 >60   GFR est AA Latest Ref Range: >60 ml/min/1.73m2 >60   Bilirubin, total Latest Ref Range: 0.2 - 1.0 MG/DL 0.3   Protein, total Latest Ref Range: 6.4 - 8.2 g/dL 7.1   Albumin Latest Ref Range: 3.5 - 5.0 g/dL 3.7   Globulin Latest Ref Range: 2.0 - 4.0 g/dL 3.4   A-G Ratio Latest Ref Range: 1.1 - 2.2   1.1   ALT (SGPT) Latest Ref Range: 12 - 78 U/L 18   AST Latest Ref Range: 15 - 37 U/L 18   Alk.  phosphatase Latest Ref Range: 45 - 117 U/L 69   Hemoglobin A1c, (calculated) Latest Ref Range: 4.2 - 6.3 % 5.4   Est. average glucose Latest Units: mg/dL 108   CULTURE, MRSA Unknown Rpt   Crossmatch Expiration Unknown 11/14/2019   TYPE & SCREEN Unknown Rpt         Skin:     Denies open wounds, cuts, sores, rashes or other vandana of concern in PAT assessment       Molly Domingo NP

## 2019-11-10 ENCOUNTER — ANESTHESIA EVENT (OUTPATIENT)
Dept: SURGERY | Age: 64
DRG: 468 | End: 2019-11-10
Payer: MEDICARE

## 2019-11-10 NOTE — ANESTHESIA PREPROCEDURE EVALUATION
Relevant Problems   No relevant active problems   Anesthetic History     PONV          Review of Systems / Medical History  Patient summary reviewed, nursing notes reviewed and pertinent labs reviewed    Pulmonary  Within defined limits                 Neuro/Psych         Psychiatric history     Cardiovascular            Dysrhythmias : SVT and atrial fibrillation      Exercise tolerance: >4 METS     GI/Hepatic/Renal     GERD      PUD     Endo/Other        Arthritis     Other Findings              Physical Exam    Airway  Mallampati: II  TM Distance: 4 - 6 cm  Neck ROM: normal range of motion   Mouth opening: Normal     Cardiovascular  Regular rate and rhythm,  S1 and S2 normal,  no murmur, click, rub, or gallop             Dental  No notable dental hx       Pulmonary  Breath sounds clear to auscultation               Abdominal  GI exam deferred       Other Findings            Anesthetic Plan    ASA: 2  Anesthesia type: total IV anesthesia and MAC          Induction: Intravenous  Anesthetic plan and risks discussed with: Patient          Anesthetic History     PONV          Review of Systems / Medical History  Patient summary reviewed, nursing notes reviewed and pertinent labs reviewed    Pulmonary  Within defined limits                 Neuro/Psych         Psychiatric history     Cardiovascular            Dysrhythmias : SVT and atrial fibrillation      Exercise tolerance: >4 METS     GI/Hepatic/Renal     GERD      PUD     Endo/Other        Arthritis     Other Findings              Physical Exam    Airway  Mallampati: II  TM Distance: 4 - 6 cm  Neck ROM: normal range of motion   Mouth opening: Normal     Cardiovascular  Regular rate and rhythm,  S1 and S2 normal,  no murmur, click, rub, or gallop             Dental  No notable dental hx       Pulmonary  Breath sounds clear to auscultation               Abdominal  GI exam deferred       Other Findings            Anesthetic Plan    ASA: 2  Anesthesia type: total IV anesthesia and MAC          Induction: Intravenous  Anesthetic plan and risks discussed with: Patient          Anesthetic History     PONV          Review of Systems / Medical History  Patient summary reviewed, nursing notes reviewed and pertinent labs reviewed    Pulmonary  Within defined limits                 Neuro/Psych         Psychiatric history     Cardiovascular            Dysrhythmias       Exercise tolerance: >4 METS     GI/Hepatic/Renal     GERD           Endo/Other        Arthritis     Other Findings              Physical Exam    Airway  Mallampati: II  TM Distance: 4 - 6 cm  Neck ROM: normal range of motion   Mouth opening: Normal     Cardiovascular  Regular rate and rhythm,  S1 and S2 normal,  no murmur, click, rub, or gallop             Dental  No notable dental hx       Pulmonary  Breath sounds clear to auscultation               Abdominal  GI exam deferred       Other Findings            Anesthetic Plan    ASA: 3  Anesthesia type: general    Monitoring Plan: BIS      Induction: Intravenous  Anesthetic plan and risks discussed with: Patient              Anesthetic History     PONV          Review of Systems / Medical History  Patient summary reviewed, nursing notes reviewed and pertinent labs reviewed    Pulmonary  Within defined limits                 Neuro/Psych         Psychiatric history and dementia    Comments: Depression  Anxiety  Seasonal Affective Disorder Cardiovascular            Dysrhythmias : atrial fibrillation and SVT      Exercise tolerance: >4 METS  Comments: S/P A-fib Ablation (8/31/17)  S/P A-Flutter Ablation (6/1/17)    TTE (4/14/17):   Mild MR, mild TI, EF=60-65%    Taking Apixaban (Eliquis)   GI/Hepatic/Renal     GERD      PUD     Endo/Other        Arthritis    Comments: Right Knee OA  S/P Left TKR Other Findings   Comments: Osteoporosis         Physical Exam    Airway  Mallampati: II  TM Distance: 4 - 6 cm  Neck ROM: normal range of motion   Mouth opening: Normal Cardiovascular  Regular rate and rhythm,  S1 and S2 normal,  no murmur, click, rub, or gallop             Dental    Dentition: Caps/crowns     Pulmonary  Breath sounds clear to auscultation               Abdominal  GI exam deferred       Other Findings            Anesthetic Plan    ASA: 3  Anesthesia type: general and total IV anesthesia    Monitoring Plan: BIS  Post-op pain plan if not by surgeon: peripheral nerve block single    Induction: Intravenous  Anesthetic plan and risks discussed with: Patient

## 2019-11-11 ENCOUNTER — ANESTHESIA EVENT (OUTPATIENT)
Dept: SURGERY | Age: 64
DRG: 468 | End: 2019-11-11
Payer: MEDICARE

## 2019-11-11 ENCOUNTER — ANESTHESIA (OUTPATIENT)
Dept: SURGERY | Age: 64
DRG: 468 | End: 2019-11-11
Payer: MEDICARE

## 2019-11-11 ENCOUNTER — HOSPITAL ENCOUNTER (INPATIENT)
Age: 64
LOS: 2 days | Discharge: HOME HEALTH | DRG: 468 | End: 2019-11-13
Attending: ORTHOPAEDIC SURGERY | Admitting: ORTHOPAEDIC SURGERY
Payer: MEDICARE

## 2019-11-11 DIAGNOSIS — Z96.651 STATUS POST TOTAL RIGHT KNEE REPLACEMENT: Primary | ICD-10-CM

## 2019-11-11 PROBLEM — Z96.659 STATUS POST TOTAL KNEE REPLACEMENT: Status: ACTIVE | Noted: 2019-11-11

## 2019-11-11 PROCEDURE — 77030040922 HC BLNKT HYPOTHRM STRY -A

## 2019-11-11 PROCEDURE — 77030008684 HC TU ET CUF COVD -B: Performed by: ANESTHESIOLOGY

## 2019-11-11 PROCEDURE — 77030006835 HC BLD SAW SAG STRY -B: Performed by: ORTHOPAEDIC SURGERY

## 2019-11-11 PROCEDURE — 76060000034 HC ANESTHESIA 1.5 TO 2 HR: Performed by: ORTHOPAEDIC SURGERY

## 2019-11-11 PROCEDURE — 74011250637 HC RX REV CODE- 250/637: Performed by: NURSE ANESTHETIST, CERTIFIED REGISTERED

## 2019-11-11 PROCEDURE — 77030018673: Performed by: ORTHOPAEDIC SURGERY

## 2019-11-11 PROCEDURE — 74011250636 HC RX REV CODE- 250/636: Performed by: NURSE ANESTHETIST, CERTIFIED REGISTERED

## 2019-11-11 PROCEDURE — 77030031139 HC SUT VCRL2 J&J -A: Performed by: ORTHOPAEDIC SURGERY

## 2019-11-11 PROCEDURE — 77030011640 HC PAD GRND REM COVD -A: Performed by: ORTHOPAEDIC SURGERY

## 2019-11-11 PROCEDURE — 77030010785: Performed by: ORTHOPAEDIC SURGERY

## 2019-11-11 PROCEDURE — 76210000001 HC OR PH I REC 2.5 TO 3 HR: Performed by: ORTHOPAEDIC SURGERY

## 2019-11-11 PROCEDURE — 77030021678 HC GLIDESCP STAT DISP VERT -B: Performed by: ANESTHESIOLOGY

## 2019-11-11 PROCEDURE — 77030036638 HC ACC KT GPS KNE V2 EXAC -D: Performed by: ORTHOPAEDIC SURGERY

## 2019-11-11 PROCEDURE — 74011250636 HC RX REV CODE- 250/636: Performed by: ANESTHESIOLOGY

## 2019-11-11 PROCEDURE — 74011250637 HC RX REV CODE- 250/637: Performed by: ORTHOPAEDIC SURGERY

## 2019-11-11 PROCEDURE — 0SPV0JZ REMOVAL OF SYNTHETIC SUBSTITUTE FROM RIGHT KNEE JOINT, TIBIAL SURFACE, OPEN APPROACH: ICD-10-PCS | Performed by: ORTHOPAEDIC SURGERY

## 2019-11-11 PROCEDURE — 74011000250 HC RX REV CODE- 250: Performed by: NURSE ANESTHETIST, CERTIFIED REGISTERED

## 2019-11-11 PROCEDURE — 77030022704 HC SUT VLOC COVD -B: Performed by: ORTHOPAEDIC SURGERY

## 2019-11-11 PROCEDURE — 77030018836 HC SOL IRR NACL ICUM -A: Performed by: ORTHOPAEDIC SURGERY

## 2019-11-11 PROCEDURE — 77030019908 HC STETH ESOPH SIMS -A: Performed by: ANESTHESIOLOGY

## 2019-11-11 PROCEDURE — 77030026438 HC STYL ET INTUB CARD -A: Performed by: ANESTHESIOLOGY

## 2019-11-11 PROCEDURE — 74011250636 HC RX REV CODE- 250/636: Performed by: ORTHOPAEDIC SURGERY

## 2019-11-11 PROCEDURE — 74011250636 HC RX REV CODE- 250/636: Performed by: PHYSICIAN ASSISTANT

## 2019-11-11 PROCEDURE — 65270000029 HC RM PRIVATE

## 2019-11-11 PROCEDURE — 0SRC0J9 REPLACEMENT OF RIGHT KNEE JOINT WITH SYNTHETIC SUBSTITUTE, CEMENTED, OPEN APPROACH: ICD-10-PCS | Performed by: ORTHOPAEDIC SURGERY

## 2019-11-11 PROCEDURE — 77030000032 HC CUF TRNQT ZIMM -B: Performed by: ORTHOPAEDIC SURGERY

## 2019-11-11 PROCEDURE — C1713 ANCHOR/SCREW BN/BN,TIS/BN: HCPCS | Performed by: ORTHOPAEDIC SURGERY

## 2019-11-11 PROCEDURE — 77030018846 HC SOL IRR STRL H20 ICUM -A: Performed by: ORTHOPAEDIC SURGERY

## 2019-11-11 PROCEDURE — 77030003862 HC BIT DRL SYNT -B: Performed by: ORTHOPAEDIC SURGERY

## 2019-11-11 PROCEDURE — 76210000006 HC OR PH I REC 0.5 TO 1 HR: Performed by: ORTHOPAEDIC SURGERY

## 2019-11-11 PROCEDURE — 74011250637 HC RX REV CODE- 250/637: Performed by: PHYSICIAN ASSISTANT

## 2019-11-11 PROCEDURE — 64447 NJX AA&/STRD FEMORAL NRV IMG: CPT

## 2019-11-11 PROCEDURE — 3E0T3BZ INTRODUCTION OF ANESTHETIC AGENT INTO PERIPHERAL NERVES AND PLEXI, PERCUTANEOUS APPROACH: ICD-10-PCS | Performed by: ORTHOPAEDIC SURGERY

## 2019-11-11 PROCEDURE — 76060000033 HC ANESTHESIA 1 TO 1.5 HR: Performed by: ORTHOPAEDIC SURGERY

## 2019-11-11 PROCEDURE — 77030013079 HC BLNKT BAIR HGGR 3M -A: Performed by: ANESTHESIOLOGY

## 2019-11-11 PROCEDURE — 77030036722: Performed by: ORTHOPAEDIC SURGERY

## 2019-11-11 PROCEDURE — 77030019707 HC TBNG LAVG CRBJT KINM -C: Performed by: ORTHOPAEDIC SURGERY

## 2019-11-11 PROCEDURE — 77030013708 HC HNDPC SUC IRR PULS STRY –B: Performed by: ORTHOPAEDIC SURGERY

## 2019-11-11 PROCEDURE — 77030040361 HC SLV COMPR DVT MDII -B: Performed by: ORTHOPAEDIC SURGERY

## 2019-11-11 PROCEDURE — 76010000161 HC OR TIME 1 TO 1.5 HR INTENSV-TIER 1: Performed by: ORTHOPAEDIC SURGERY

## 2019-11-11 PROCEDURE — 77030033138 HC SUT PGA STRATFX J&J -B: Performed by: ORTHOPAEDIC SURGERY

## 2019-11-11 PROCEDURE — 76010000162 HC OR TIME 1.5 TO 2 HR INTENSV-TIER 1: Performed by: ORTHOPAEDIC SURGERY

## 2019-11-11 PROCEDURE — 74011000250 HC RX REV CODE- 250: Performed by: ORTHOPAEDIC SURGERY

## 2019-11-11 PROCEDURE — 0SRV0J9 REPLACEMENT OF RIGHT KNEE JOINT, TIBIAL SURFACE WITH SYNTHETIC SUBSTITUTE, CEMENTED, OPEN APPROACH: ICD-10-PCS | Performed by: ORTHOPAEDIC SURGERY

## 2019-11-11 PROCEDURE — 77030033067 HC SUT PDO STRATFX SPIR J&J -B: Performed by: ORTHOPAEDIC SURGERY

## 2019-11-11 PROCEDURE — 74011000250 HC RX REV CODE- 250: Performed by: PHYSICIAN ASSISTANT

## 2019-11-11 PROCEDURE — C1776 JOINT DEVICE (IMPLANTABLE): HCPCS | Performed by: ORTHOPAEDIC SURGERY

## 2019-11-11 PROCEDURE — 77030039267 HC ADH SKN EXOFIN S2SG -B: Performed by: ORTHOPAEDIC SURGERY

## 2019-11-11 DEVICE — IMPLANTABLE DEVICE
Type: IMPLANTABLE DEVICE | Site: KNEE | Status: FUNCTIONAL
Brand: VANGUARD KNEE SYSTEM

## 2019-11-11 DEVICE — SCREW BNE L45MM DIA4MM CANC S STL ST CANN NONLOCKING FULL: Type: IMPLANTABLE DEVICE | Site: KNEE | Status: FUNCTIONAL

## 2019-11-11 DEVICE — SCREW BNE L60MM DIA4MM CANC S STL ST CANN NONLOCKING FULL: Type: IMPLANTABLE DEVICE | Site: KNEE | Status: FUNCTIONAL

## 2019-11-11 DEVICE — TRAY TIB L71MM KNEE COPOLYESTER SIL INTLOK PRI CEM VANGUARD: Type: IMPLANTABLE DEVICE | Site: KNEE | Status: FUNCTIONAL

## 2019-11-11 DEVICE — COBALT HV BONE CEMENT 40GM
Type: IMPLANTABLE DEVICE | Site: KNEE | Status: FUNCTIONAL
Brand: DJO SURGICAL

## 2019-11-11 DEVICE — IMPLANTABLE DEVICE: Type: IMPLANTABLE DEVICE | Site: KNEE | Status: FUNCTIONAL

## 2019-11-11 DEVICE — BEARING TIB AP71MM ML75MM THK10MM KNEE ARCM POST STBL MOD
Type: IMPLANTABLE DEVICE | Site: KNEE | Status: NON-FUNCTIONAL
Removed: 2019-11-11

## 2019-11-11 DEVICE — TRAY TIB L71MM KNEE CO CHROM I BEAM MOD INTLOK CEM VANGUARD
Type: IMPLANTABLE DEVICE | Site: KNEE | Status: NON-FUNCTIONAL
Removed: 2019-11-11

## 2019-11-11 RX ORDER — FENTANYL CITRATE 50 UG/ML
INJECTION, SOLUTION INTRAMUSCULAR; INTRAVENOUS AS NEEDED
Status: DISCONTINUED | OUTPATIENT
Start: 2019-11-11 | End: 2019-11-11 | Stop reason: HOSPADM

## 2019-11-11 RX ORDER — PREGABALIN 75 MG/1
150 CAPSULE ORAL ONCE
Status: COMPLETED | OUTPATIENT
Start: 2019-11-11 | End: 2019-11-11

## 2019-11-11 RX ORDER — PANTOPRAZOLE SODIUM 40 MG/1
40 TABLET, DELAYED RELEASE ORAL
Status: DISCONTINUED | OUTPATIENT
Start: 2019-11-12 | End: 2019-11-13 | Stop reason: HOSPADM

## 2019-11-11 RX ORDER — ONDANSETRON 2 MG/ML
INJECTION INTRAMUSCULAR; INTRAVENOUS AS NEEDED
Status: DISCONTINUED | OUTPATIENT
Start: 2019-11-11 | End: 2019-11-11 | Stop reason: HOSPADM

## 2019-11-11 RX ORDER — ONDANSETRON 2 MG/ML
4 INJECTION INTRAMUSCULAR; INTRAVENOUS
Status: DISPENSED | OUTPATIENT
Start: 2019-11-11 | End: 2019-11-12

## 2019-11-11 RX ORDER — PREDNISOLONE ACETATE 10 MG/ML
1 SUSPENSION/ DROPS OPHTHALMIC 4 TIMES DAILY
Status: DISCONTINUED | OUTPATIENT
Start: 2019-11-11 | End: 2019-11-13 | Stop reason: HOSPADM

## 2019-11-11 RX ORDER — ROPIVACAINE HYDROCHLORIDE 5 MG/ML
INJECTION, SOLUTION EPIDURAL; INFILTRATION; PERINEURAL AS NEEDED
Status: DISCONTINUED | OUTPATIENT
Start: 2019-11-11 | End: 2019-11-11 | Stop reason: HOSPADM

## 2019-11-11 RX ORDER — HYDROMORPHONE HYDROCHLORIDE 2 MG/ML
INJECTION, SOLUTION INTRAMUSCULAR; INTRAVENOUS; SUBCUTANEOUS AS NEEDED
Status: DISCONTINUED | OUTPATIENT
Start: 2019-11-11 | End: 2019-11-11 | Stop reason: HOSPADM

## 2019-11-11 RX ORDER — HYDROMORPHONE HYDROCHLORIDE 1 MG/ML
0.2 INJECTION, SOLUTION INTRAMUSCULAR; INTRAVENOUS; SUBCUTANEOUS
Status: DISCONTINUED | OUTPATIENT
Start: 2019-11-11 | End: 2019-11-11 | Stop reason: HOSPADM

## 2019-11-11 RX ORDER — PROPOFOL 10 MG/ML
INJECTION, EMULSION INTRAVENOUS AS NEEDED
Status: DISCONTINUED | OUTPATIENT
Start: 2019-11-11 | End: 2019-11-11 | Stop reason: HOSPADM

## 2019-11-11 RX ORDER — FENTANYL CITRATE 50 UG/ML
25 INJECTION, SOLUTION INTRAMUSCULAR; INTRAVENOUS
Status: COMPLETED | OUTPATIENT
Start: 2019-11-11 | End: 2019-11-11

## 2019-11-11 RX ORDER — EPHEDRINE SULFATE/0.9% NACL/PF 50 MG/5 ML
SYRINGE (ML) INTRAVENOUS AS NEEDED
Status: DISCONTINUED | OUTPATIENT
Start: 2019-11-11 | End: 2019-11-11 | Stop reason: HOSPADM

## 2019-11-11 RX ORDER — SODIUM CHLORIDE 0.9 % (FLUSH) 0.9 %
5-40 SYRINGE (ML) INJECTION EVERY 8 HOURS
Status: DISCONTINUED | OUTPATIENT
Start: 2019-11-11 | End: 2019-11-13 | Stop reason: HOSPADM

## 2019-11-11 RX ORDER — SODIUM CHLORIDE 0.9 % (FLUSH) 0.9 %
5-40 SYRINGE (ML) INJECTION AS NEEDED
Status: DISCONTINUED | OUTPATIENT
Start: 2019-11-11 | End: 2019-11-11 | Stop reason: HOSPADM

## 2019-11-11 RX ORDER — POLYETHYLENE GLYCOL 3350 17 G/17G
17 POWDER, FOR SOLUTION ORAL DAILY
Status: DISCONTINUED | OUTPATIENT
Start: 2019-11-12 | End: 2019-11-13 | Stop reason: HOSPADM

## 2019-11-11 RX ORDER — PROPOFOL 10 MG/ML
INJECTION, EMULSION INTRAVENOUS
Status: DISCONTINUED | OUTPATIENT
Start: 2019-11-11 | End: 2019-11-11 | Stop reason: HOSPADM

## 2019-11-11 RX ORDER — SODIUM CHLORIDE, SODIUM LACTATE, POTASSIUM CHLORIDE, CALCIUM CHLORIDE 600; 310; 30; 20 MG/100ML; MG/100ML; MG/100ML; MG/100ML
25 INJECTION, SOLUTION INTRAVENOUS CONTINUOUS
Status: DISCONTINUED | OUTPATIENT
Start: 2019-11-11 | End: 2019-11-11 | Stop reason: HOSPADM

## 2019-11-11 RX ORDER — OXYCODONE HYDROCHLORIDE 5 MG/1
5 TABLET ORAL
Status: DISCONTINUED | OUTPATIENT
Start: 2019-11-11 | End: 2019-11-13 | Stop reason: HOSPADM

## 2019-11-11 RX ORDER — FACIAL-BODY WIPES
10 EACH TOPICAL DAILY PRN
Status: DISCONTINUED | OUTPATIENT
Start: 2019-11-13 | End: 2019-11-13 | Stop reason: HOSPADM

## 2019-11-11 RX ORDER — SERTRALINE HYDROCHLORIDE 50 MG/1
50 TABLET, FILM COATED ORAL 2 TIMES DAILY
Status: DISCONTINUED | OUTPATIENT
Start: 2019-11-11 | End: 2019-11-13 | Stop reason: HOSPADM

## 2019-11-11 RX ORDER — NALOXONE HYDROCHLORIDE 0.4 MG/ML
0.4 INJECTION, SOLUTION INTRAMUSCULAR; INTRAVENOUS; SUBCUTANEOUS AS NEEDED
Status: DISCONTINUED | OUTPATIENT
Start: 2019-11-11 | End: 2019-11-13 | Stop reason: HOSPADM

## 2019-11-11 RX ORDER — NEOSTIGMINE METHYLSULFATE 1 MG/ML
INJECTION INTRAVENOUS AS NEEDED
Status: DISCONTINUED | OUTPATIENT
Start: 2019-11-11 | End: 2019-11-11 | Stop reason: HOSPADM

## 2019-11-11 RX ORDER — DEXAMETHASONE SODIUM PHOSPHATE 4 MG/ML
10 INJECTION, SOLUTION INTRA-ARTICULAR; INTRALESIONAL; INTRAMUSCULAR; INTRAVENOUS; SOFT TISSUE ONCE
Status: COMPLETED | OUTPATIENT
Start: 2019-11-12 | End: 2019-11-12

## 2019-11-11 RX ORDER — SUCCINYLCHOLINE CHLORIDE 20 MG/ML
INJECTION INTRAMUSCULAR; INTRAVENOUS AS NEEDED
Status: DISCONTINUED | OUTPATIENT
Start: 2019-11-11 | End: 2019-11-11 | Stop reason: HOSPADM

## 2019-11-11 RX ORDER — LIDOCAINE HYDROCHLORIDE 20 MG/ML
INJECTION, SOLUTION EPIDURAL; INFILTRATION; INTRACAUDAL; PERINEURAL AS NEEDED
Status: DISCONTINUED | OUTPATIENT
Start: 2019-11-11 | End: 2019-11-11 | Stop reason: HOSPADM

## 2019-11-11 RX ORDER — AMOXICILLIN 250 MG
1 CAPSULE ORAL 2 TIMES DAILY
Status: DISCONTINUED | OUTPATIENT
Start: 2019-11-11 | End: 2019-11-13 | Stop reason: HOSPADM

## 2019-11-11 RX ORDER — FLECAINIDE ACETATE 100 MG/1
50 TABLET ORAL 2 TIMES DAILY
Status: DISCONTINUED | OUTPATIENT
Start: 2019-11-11 | End: 2019-11-13 | Stop reason: HOSPADM

## 2019-11-11 RX ORDER — SODIUM CHLORIDE 0.9 % (FLUSH) 0.9 %
5-40 SYRINGE (ML) INJECTION AS NEEDED
Status: DISCONTINUED | OUTPATIENT
Start: 2019-11-11 | End: 2019-11-13 | Stop reason: HOSPADM

## 2019-11-11 RX ORDER — DIPHENHYDRAMINE HYDROCHLORIDE 50 MG/ML
12.5 INJECTION, SOLUTION INTRAMUSCULAR; INTRAVENOUS AS NEEDED
Status: DISCONTINUED | OUTPATIENT
Start: 2019-11-11 | End: 2019-11-11 | Stop reason: HOSPADM

## 2019-11-11 RX ORDER — CEFAZOLIN SODIUM 1 G/3ML
INJECTION, POWDER, FOR SOLUTION INTRAMUSCULAR; INTRAVENOUS AS NEEDED
Status: DISCONTINUED | OUTPATIENT
Start: 2019-11-11 | End: 2019-11-11 | Stop reason: HOSPADM

## 2019-11-11 RX ORDER — DEXAMETHASONE SODIUM PHOSPHATE 4 MG/ML
INJECTION, SOLUTION INTRA-ARTICULAR; INTRALESIONAL; INTRAMUSCULAR; INTRAVENOUS; SOFT TISSUE AS NEEDED
Status: DISCONTINUED | OUTPATIENT
Start: 2019-11-11 | End: 2019-11-11 | Stop reason: HOSPADM

## 2019-11-11 RX ORDER — CELECOXIB 200 MG/1
400 CAPSULE ORAL ONCE
Status: COMPLETED | OUTPATIENT
Start: 2019-11-11 | End: 2019-11-11

## 2019-11-11 RX ORDER — SCOLOPAMINE TRANSDERMAL SYSTEM 1 MG/1
PATCH, EXTENDED RELEASE TRANSDERMAL AS NEEDED
Status: DISCONTINUED | OUTPATIENT
Start: 2019-11-11 | End: 2019-11-11 | Stop reason: HOSPADM

## 2019-11-11 RX ORDER — ACETAMINOPHEN 500 MG
1000 TABLET ORAL ONCE
Status: COMPLETED | OUTPATIENT
Start: 2019-11-11 | End: 2019-11-11

## 2019-11-11 RX ORDER — GLYCOPYRROLATE 0.2 MG/ML
INJECTION INTRAMUSCULAR; INTRAVENOUS AS NEEDED
Status: DISCONTINUED | OUTPATIENT
Start: 2019-11-11 | End: 2019-11-11 | Stop reason: HOSPADM

## 2019-11-11 RX ORDER — ONDANSETRON 4 MG/1
4 TABLET, ORALLY DISINTEGRATING ORAL
Status: DISCONTINUED | OUTPATIENT
Start: 2019-11-13 | End: 2019-11-12

## 2019-11-11 RX ORDER — MORPHINE SULFATE 2 MG/ML
2 INJECTION, SOLUTION INTRAMUSCULAR; INTRAVENOUS
Status: ACTIVE | OUTPATIENT
Start: 2019-11-11 | End: 2019-11-12

## 2019-11-11 RX ORDER — ACETAMINOPHEN 325 MG/1
650 TABLET ORAL EVERY 6 HOURS
Status: DISCONTINUED | OUTPATIENT
Start: 2019-11-11 | End: 2019-11-13 | Stop reason: HOSPADM

## 2019-11-11 RX ORDER — SODIUM CHLORIDE 0.9 % (FLUSH) 0.9 %
5-40 SYRINGE (ML) INJECTION EVERY 8 HOURS
Status: DISCONTINUED | OUTPATIENT
Start: 2019-11-11 | End: 2019-11-11 | Stop reason: HOSPADM

## 2019-11-11 RX ORDER — ROCURONIUM BROMIDE 10 MG/ML
INJECTION, SOLUTION INTRAVENOUS AS NEEDED
Status: DISCONTINUED | OUTPATIENT
Start: 2019-11-11 | End: 2019-11-11 | Stop reason: HOSPADM

## 2019-11-11 RX ORDER — PHENYLEPHRINE HCL IN 0.9% NACL 0.4MG/10ML
SYRINGE (ML) INTRAVENOUS AS NEEDED
Status: DISCONTINUED | OUTPATIENT
Start: 2019-11-11 | End: 2019-11-11 | Stop reason: HOSPADM

## 2019-11-11 RX ORDER — DIPHENHYDRAMINE HCL 12.5MG/5ML
12.5 LIQUID (ML) ORAL
Status: DISCONTINUED | OUTPATIENT
Start: 2019-11-11 | End: 2019-11-13 | Stop reason: HOSPADM

## 2019-11-11 RX ORDER — OXYCODONE HYDROCHLORIDE 5 MG/1
10 TABLET ORAL
Status: DISCONTINUED | OUTPATIENT
Start: 2019-11-11 | End: 2019-11-13 | Stop reason: HOSPADM

## 2019-11-11 RX ORDER — MIDAZOLAM HYDROCHLORIDE 1 MG/ML
INJECTION, SOLUTION INTRAMUSCULAR; INTRAVENOUS AS NEEDED
Status: DISCONTINUED | OUTPATIENT
Start: 2019-11-11 | End: 2019-11-11 | Stop reason: HOSPADM

## 2019-11-11 RX ORDER — LIDOCAINE HYDROCHLORIDE 10 MG/ML
0.1 INJECTION, SOLUTION EPIDURAL; INFILTRATION; INTRACAUDAL; PERINEURAL AS NEEDED
Status: DISCONTINUED | OUTPATIENT
Start: 2019-11-11 | End: 2019-11-11 | Stop reason: HOSPADM

## 2019-11-11 RX ORDER — SODIUM CHLORIDE 9 MG/ML
125 INJECTION, SOLUTION INTRAVENOUS CONTINUOUS
Status: DISPENSED | OUTPATIENT
Start: 2019-11-11 | End: 2019-11-12

## 2019-11-11 RX ADMIN — PREGABALIN 150 MG: 75 CAPSULE ORAL at 07:47

## 2019-11-11 RX ADMIN — WATER 2 G: 1 INJECTION INTRAMUSCULAR; INTRAVENOUS; SUBCUTANEOUS at 16:08

## 2019-11-11 RX ADMIN — FENTANYL CITRATE 25 MCG: 50 INJECTION, SOLUTION INTRAMUSCULAR; INTRAVENOUS at 15:04

## 2019-11-11 RX ADMIN — CEFAZOLIN 2 G: 1 INJECTION, POWDER, FOR SOLUTION INTRAMUSCULAR; INTRAVENOUS; PARENTERAL at 13:01

## 2019-11-11 RX ADMIN — SUCCINYLCHOLINE CHLORIDE 140 MG: 20 INJECTION, SOLUTION INTRAMUSCULAR; INTRAVENOUS at 12:49

## 2019-11-11 RX ADMIN — SODIUM CHLORIDE, SODIUM LACTATE, POTASSIUM CHLORIDE, AND CALCIUM CHLORIDE: 600; 310; 30; 20 INJECTION, SOLUTION INTRAVENOUS at 09:38

## 2019-11-11 RX ADMIN — PROPOFOL 50 MG: 10 INJECTION, EMULSION INTRAVENOUS at 08:50

## 2019-11-11 RX ADMIN — HYDROMORPHONE HYDROCHLORIDE 0.4 MG: 2 INJECTION, SOLUTION INTRAMUSCULAR; INTRAVENOUS; SUBCUTANEOUS at 14:07

## 2019-11-11 RX ADMIN — PROPOFOL 50 MG: 10 INJECTION, EMULSION INTRAVENOUS at 08:56

## 2019-11-11 RX ADMIN — CELECOXIB 400 MG: 200 CAPSULE ORAL at 07:47

## 2019-11-11 RX ADMIN — FENTANYL CITRATE 25 MCG: 50 INJECTION, SOLUTION INTRAMUSCULAR; INTRAVENOUS at 10:00

## 2019-11-11 RX ADMIN — Medication 3 AMPULE: at 07:33

## 2019-11-11 RX ADMIN — Medication 10 MG: at 08:34

## 2019-11-11 RX ADMIN — FENTANYL CITRATE 25 MCG: 50 INJECTION, SOLUTION INTRAMUSCULAR; INTRAVENOUS at 09:57

## 2019-11-11 RX ADMIN — SODIUM CHLORIDE 125 ML/HR: 900 INJECTION, SOLUTION INTRAVENOUS at 18:57

## 2019-11-11 RX ADMIN — PREDNISOLONE ACETATE 1 DROP: 10 SUSPENSION/ DROPS OPHTHALMIC at 18:39

## 2019-11-11 RX ADMIN — HYDROMORPHONE HYDROCHLORIDE 0.2 MG: 1 INJECTION, SOLUTION INTRAMUSCULAR; INTRAVENOUS; SUBCUTANEOUS at 10:40

## 2019-11-11 RX ADMIN — MIDAZOLAM HYDROCHLORIDE 1 MG: 1 INJECTION INTRAMUSCULAR; INTRAVENOUS at 08:10

## 2019-11-11 RX ADMIN — ROCURONIUM BROMIDE 20 MG: 10 INJECTION INTRAVENOUS at 12:56

## 2019-11-11 RX ADMIN — ROCURONIUM BROMIDE 10 MG: 10 INJECTION INTRAVENOUS at 12:49

## 2019-11-11 RX ADMIN — PROPOFOL 100 MCG/KG/MIN: 10 INJECTION, EMULSION INTRAVENOUS at 12:51

## 2019-11-11 RX ADMIN — SUCCINYLCHOLINE CHLORIDE 140 MG: 20 INJECTION, SOLUTION INTRAMUSCULAR; INTRAVENOUS at 08:30

## 2019-11-11 RX ADMIN — ACETAMINOPHEN 650 MG: 325 TABLET ORAL at 17:53

## 2019-11-11 RX ADMIN — ACETAMINOPHEN 1000 MG: 500 TABLET ORAL at 07:47

## 2019-11-11 RX ADMIN — LIDOCAINE HYDROCHLORIDE 80 MG: 20 INJECTION, SOLUTION EPIDURAL; INFILTRATION; INTRACAUDAL; PERINEURAL at 08:27

## 2019-11-11 RX ADMIN — Medication 80 MCG: at 14:15

## 2019-11-11 RX ADMIN — FENTANYL CITRATE 25 MCG: 50 INJECTION, SOLUTION INTRAMUSCULAR; INTRAVENOUS at 10:06

## 2019-11-11 RX ADMIN — PROPOFOL 50 MG: 10 INJECTION, EMULSION INTRAVENOUS at 09:34

## 2019-11-11 RX ADMIN — PROPOFOL 100 MCG/KG/MIN: 10 INJECTION, EMULSION INTRAVENOUS at 08:31

## 2019-11-11 RX ADMIN — SERTRALINE HYDROCHLORIDE 50 MG: 50 TABLET ORAL at 17:53

## 2019-11-11 RX ADMIN — FENTANYL CITRATE 50 MCG: 50 INJECTION INTRAMUSCULAR; INTRAVENOUS at 08:07

## 2019-11-11 RX ADMIN — DEXAMETHASONE SODIUM PHOSPHATE 8 MG: 4 INJECTION, SOLUTION INTRAMUSCULAR; INTRAVENOUS at 08:39

## 2019-11-11 RX ADMIN — PROPOFOL 120 MG: 10 INJECTION, EMULSION INTRAVENOUS at 08:30

## 2019-11-11 RX ADMIN — FENTANYL CITRATE 50 MCG: 50 INJECTION INTRAMUSCULAR; INTRAVENOUS at 08:05

## 2019-11-11 RX ADMIN — NEOSTIGMINE METHYLSULFATE 3 MG: 1 INJECTION INTRAVENOUS at 13:53

## 2019-11-11 RX ADMIN — WATER 2 G: 1 INJECTION INTRAMUSCULAR; INTRAVENOUS; SUBCUTANEOUS at 08:35

## 2019-11-11 RX ADMIN — PROPOFOL 50 MG: 10 INJECTION, EMULSION INTRAVENOUS at 08:45

## 2019-11-11 RX ADMIN — FENTANYL CITRATE 25 MCG: 50 INJECTION, SOLUTION INTRAMUSCULAR; INTRAVENOUS at 10:03

## 2019-11-11 RX ADMIN — HYDROMORPHONE HYDROCHLORIDE 0.2 MG: 1 INJECTION, SOLUTION INTRAMUSCULAR; INTRAVENOUS; SUBCUTANEOUS at 10:25

## 2019-11-11 RX ADMIN — FENTANYL CITRATE 50 MCG: 50 INJECTION INTRAMUSCULAR; INTRAVENOUS at 08:03

## 2019-11-11 RX ADMIN — HYDROMORPHONE HYDROCHLORIDE 0.2 MG: 1 INJECTION, SOLUTION INTRAMUSCULAR; INTRAVENOUS; SUBCUTANEOUS at 11:10

## 2019-11-11 RX ADMIN — Medication 80 MCG: at 12:57

## 2019-11-11 RX ADMIN — MIDAZOLAM HYDROCHLORIDE 2 MG: 1 INJECTION INTRAMUSCULAR; INTRAVENOUS at 12:40

## 2019-11-11 RX ADMIN — FENTANYL CITRATE 50 MCG: 50 INJECTION INTRAMUSCULAR; INTRAVENOUS at 08:27

## 2019-11-11 RX ADMIN — SODIUM CHLORIDE, POTASSIUM CHLORIDE, SODIUM LACTATE AND CALCIUM CHLORIDE: 600; 310; 30; 20 INJECTION, SOLUTION INTRAVENOUS at 12:57

## 2019-11-11 RX ADMIN — WATER 2 G: 1 INJECTION INTRAMUSCULAR; INTRAVENOUS; SUBCUTANEOUS at 23:45

## 2019-11-11 RX ADMIN — PROPOFOL 30 MG: 10 INJECTION, EMULSION INTRAVENOUS at 08:46

## 2019-11-11 RX ADMIN — PROPOFOL 120 MG: 10 INJECTION, EMULSION INTRAVENOUS at 12:49

## 2019-11-11 RX ADMIN — SCOPALAMINE 1 PATCH: 1 PATCH, EXTENDED RELEASE TRANSDERMAL at 08:19

## 2019-11-11 RX ADMIN — ONDANSETRON HYDROCHLORIDE 4 MG: 2 INJECTION, SOLUTION INTRAMUSCULAR; INTRAVENOUS at 09:20

## 2019-11-11 RX ADMIN — ROPIVACAINE HYDROCHLORIDE 10 ML: 5 INJECTION, SOLUTION EPIDURAL; INFILTRATION; PERINEURAL at 08:12

## 2019-11-11 RX ADMIN — HYDROMORPHONE HYDROCHLORIDE 0.2 MG: 1 INJECTION, SOLUTION INTRAMUSCULAR; INTRAVENOUS; SUBCUTANEOUS at 10:55

## 2019-11-11 RX ADMIN — OXYCODONE HYDROCHLORIDE 5 MG: 5 TABLET ORAL at 21:19

## 2019-11-11 RX ADMIN — ACETAMINOPHEN 650 MG: 325 TABLET ORAL at 23:44

## 2019-11-11 RX ADMIN — HYDROMORPHONE HYDROCHLORIDE 0.2 MG: 1 INJECTION, SOLUTION INTRAMUSCULAR; INTRAVENOUS; SUBCUTANEOUS at 14:54

## 2019-11-11 RX ADMIN — SODIUM CHLORIDE 125 ML/HR: 900 INJECTION, SOLUTION INTRAVENOUS at 10:13

## 2019-11-11 RX ADMIN — PREDNISOLONE ACETATE 1 DROP: 10 SUSPENSION/ DROPS OPHTHALMIC at 19:30

## 2019-11-11 RX ADMIN — HYDROMORPHONE HYDROCHLORIDE 0.2 MG: 1 INJECTION, SOLUTION INTRAMUSCULAR; INTRAVENOUS; SUBCUTANEOUS at 10:10

## 2019-11-11 RX ADMIN — MIDAZOLAM HYDROCHLORIDE 2 MG: 1 INJECTION INTRAMUSCULAR; INTRAVENOUS at 08:03

## 2019-11-11 RX ADMIN — FENTANYL CITRATE 50 MCG: 50 INJECTION, SOLUTION INTRAMUSCULAR; INTRAVENOUS at 12:49

## 2019-11-11 RX ADMIN — HYDROMORPHONE HYDROCHLORIDE 0.2 MG: 1 INJECTION, SOLUTION INTRAMUSCULAR; INTRAVENOUS; SUBCUTANEOUS at 14:48

## 2019-11-11 RX ADMIN — LIDOCAINE HYDROCHLORIDE 40 MG: 20 INJECTION, SOLUTION EPIDURAL; INFILTRATION; INTRACAUDAL; PERINEURAL at 12:49

## 2019-11-11 RX ADMIN — FENTANYL CITRATE 50 MCG: 50 INJECTION INTRAMUSCULAR; INTRAVENOUS at 08:45

## 2019-11-11 RX ADMIN — Medication 40 MCG: at 08:37

## 2019-11-11 RX ADMIN — Medication 10 ML: at 16:08

## 2019-11-11 RX ADMIN — FLECAINIDE ACETATE 50 MG: 100 TABLET ORAL at 17:53

## 2019-11-11 RX ADMIN — FENTANYL CITRATE 25 MCG: 50 INJECTION, SOLUTION INTRAMUSCULAR; INTRAVENOUS at 14:59

## 2019-11-11 RX ADMIN — ROPIVACAINE HYDROCHLORIDE 10 ML: 5 INJECTION, SOLUTION EPIDURAL; INFILTRATION; PERINEURAL at 08:13

## 2019-11-11 RX ADMIN — FENTANYL CITRATE 50 MCG: 50 INJECTION, SOLUTION INTRAMUSCULAR; INTRAVENOUS at 13:29

## 2019-11-11 RX ADMIN — PROPOFOL 100 MG: 10 INJECTION, EMULSION INTRAVENOUS at 09:25

## 2019-11-11 RX ADMIN — Medication 80 MCG: at 12:53

## 2019-11-11 RX ADMIN — MIDAZOLAM HYDROCHLORIDE 1 MG: 1 INJECTION INTRAMUSCULAR; INTRAVENOUS at 08:08

## 2019-11-11 RX ADMIN — FENTANYL CITRATE 25 MCG: 50 INJECTION, SOLUTION INTRAMUSCULAR; INTRAVENOUS at 15:08

## 2019-11-11 RX ADMIN — ONDANSETRON HYDROCHLORIDE 4 MG: 2 INJECTION, SOLUTION INTRAMUSCULAR; INTRAVENOUS at 13:39

## 2019-11-11 RX ADMIN — ROCURONIUM BROMIDE 5 MG: 10 INJECTION INTRAVENOUS at 08:27

## 2019-11-11 RX ADMIN — FENTANYL CITRATE 25 MCG: 50 INJECTION, SOLUTION INTRAMUSCULAR; INTRAVENOUS at 15:13

## 2019-11-11 RX ADMIN — Medication 10 MG: at 08:37

## 2019-11-11 RX ADMIN — MIDAZOLAM HYDROCHLORIDE 1 MG: 1 INJECTION INTRAMUSCULAR; INTRAVENOUS at 08:05

## 2019-11-11 RX ADMIN — DOCUSATE SODIUM - SENNOSIDES 1 TABLET: 50; 8.6 TABLET, FILM COATED ORAL at 17:53

## 2019-11-11 RX ADMIN — SODIUM CHLORIDE, SODIUM LACTATE, POTASSIUM CHLORIDE, AND CALCIUM CHLORIDE 25 ML/HR: 600; 310; 30; 20 INJECTION, SOLUTION INTRAVENOUS at 07:33

## 2019-11-11 RX ADMIN — GLYCOPYRROLATE 0.3 MG: 0.2 INJECTION, SOLUTION INTRAMUSCULAR; INTRAVENOUS at 13:53

## 2019-11-11 NOTE — OP NOTES
Patrick Pedersen MD - Adult Reconstruction and Total Joint Replacement    Teri Franklin - MRN [id number] - : 1955 (75 y.o.)    Date: 2019    PREOPERATIVE DIAGNOSIS:  Right knee degenerative joint disease with nickel allergy    POSTOPERATIVE DIAGNOSIS:  Same    PROCEDURE: Total knee replacement with imageless computer navigation    Implants Used:   Implant Name Type Inv. Item Serial No.  Lot No. LRB No. Used Action   CEMENT BNE [place] 40/20GM -- FORMERLY [place] - [id number] - SN/A  CEMENT BNE [place] 40/20GM -- FORMERLY [place] - [id number] N/A [facility] [id number] Right 1 Implanted   TRAY TIB I-BEAM FIX 71MM COCR --  - SN/A  TRAY TIB I-BEAM FIX 71MM COCR --  N/A [employer] [id number] Right 1 Implanted   Biomet Vanguard Knee System  Metal Alternative PS Femoral Closed Box    N/A  [id number] Right 1 Implanted   INSERT TIB BEAR PS 10X71/75MM -- VANGUARD - SN/A  INSERT TIB BEAR PS 10X71/75MM -- [employer]ARD N/A [employer] [id number] Right 1 Implanted       Anesthesia: General + block / local    Pre-operative antibiotic: Karma    Surgeon: Patrick Pedersen     Assist: IRENE Rodriguez (Performing all or most of the following assistant-at-surgery services including but not limited to: proper patient positioning, sterile/prep and draping, placement of instruments/trackers, operative exposure, minor portions of bone / soft tissue excision, final irrigation and debridement, deep and superficial closure, application of final dressings)    EBL: minimal    Drains: none     Specimens: none     Complications: none     Condition: stable to PACU     INDICATIONS: Longstanding knee pain unresponsive to conservative measures. The risks, benefits, and alternatives to the procedure were explained to the patient and they wished to proceed. They understood no guarantees could be given about the outcome of the procedure.     DESCRIPTION OF PROCEDURE:  The patient was brought in to the operating room and placed supine on a standard OR table. Anesthesia was provided by the anesthesia team without difficulty. A thigh tourniquet was applied to the operative limb which was then prepped and draped in the usual fashion. Pre-operative antibiotics were administered. An appropriate time-out was performed. The limb was exsanguinated with an Esmarch and tourniquet inflated. A standard medial parapatellar arthrotomy was used. The fat pad was excised. The patella was then subluxed laterally and attention turned to the femur. Navigation was used after the registration sequence to make the appropriate distal femoral cut. The femoral cut was confirmed with navigation. We then used the 4-in-1 guide to size the femur and drill for the lugs of the cut block. The cruciate ligaments were excised along with the anterior portions of the menisci. The  4-in-1 guide position was confirmed with navigation and pinned in parallel to the epicondylar axis and perpendicular to Levar's line. The anterior, posterior and chamfer cuts were performed, protecting the collateral ligaments at all times. The posterior capsule was cleared and any osteophytes were removed. The box cut was then made. Attention was then turned to the tibia. The navigation system was used to perform the tibial cut perpendicular to the mechanical axis. The remainder of the menisci were excised and the tibia sized. The patella was noted to be in acceptable condition and was not resurfaced. Selective denervation was performed. Trial components were then placed and the knee taken through a range of motion and found to have excellent range of motion, stability, and ligamentous balance. The rotation of the tibial tray was marked and the trials removed. The trial tibial tray was reinserted and the tibial prepared for the keel of the tray. All cut surfaces were thoroughly lavaged and dried.      The final implants were then cemented into place and excess cement removed with a curette. The tibial tray liner was inserted into the locking mechanism and the knee reduced. It was again taken through a range of motion and found to be stable in all planes with excellent tracking of the patella. The wound was thoroughly lavaged again. The extensor mechanism was repaired with heavy interrupted suture and a running stitch. Periarticular injection was performed. Skin closure was then performed in layers. Sterile compressive dressings were applied. The patient was awakened, moved to the stretcher and taken to the recovery room in stable condition. At the conclusion of the procedure, all counts were correct. There no immediate complications.

## 2019-11-11 NOTE — PERIOP NOTES
TRANSFER - OUT REPORT:    Verbal report given to Liang Ornelas RN (name) on [facility]  being transferred to [facility](unit)       Report consisted of patients Situation, Background, Assessment and   Recommendations(SBAR). Information from the following report(s) SBAR, Kardex, OR Summary, Intake/Output and MAR was reviewed with the receiving nurse. Opportunity for questions and clarification was provided.       Patient transported with:   O2 @ 2 liters  Tech

## 2019-11-11 NOTE — ANESTHESIA PROCEDURE NOTES
Peripheral Block    Start time: 11/11/2019 8:02 AM  End time: 11/11/2019 8:13 AM  Performed by: Eliza Carter MD  Authorized by: Eliza Carter MD       Pre-procedure: Indications: at surgeon's request and post-op pain management    Preanesthetic Checklist: patient identified, risks and benefits discussed, site marked, timeout performed, anesthesia consent given and patient being monitored    Timeout Time: 08:02          Block Type:   Block Type:   Adductor canal  Laterality:  Right  Monitoring:  Standard ASA monitoring, continuous pulse ox, frequent vital sign checks, heart rate, responsive to questions and oxygen  Injection Technique:  Single shot  Procedures: ultrasound guided    Patient Position: supine  Prep: chlorhexidine    Location:  Mid thigh  Needle Type:  Stimuplex  Needle Gauge:  21 G  Needle Localization:  Anatomical landmarks and ultrasound guidance    Assessment:  Number of attempts:  1  Injection Assessment:  Incremental injection every 5 mL, local visualized surrounding nerve on ultrasound, negative aspiration for blood, no paresthesia, no intravascular symptoms and ultrasound image on chart  Patient tolerance:  Patient tolerated the procedure well with no immediate complications

## 2019-11-11 NOTE — PERIOP NOTES
Handoff Report from Operating Room to PACU    Report received from Luis Carlisle  and Cynthia Quezada CRNA regarding Sheyla Brewer.      Surgeon(s):  Rocael Meade MD  And Procedure(s) (LRB):  RE-ENTRY RIGHT TOTAL KNEE ARTHROPLASTY WITH NAVIGATION (GEN WITH BLOCK) (LATEX ALLERGY) (NICKEL ALLERGY) (Right)  confirmed   with allergies and dressings discussed. Anesthesia type, drugs, patient history, complications, estimated blood loss, vital signs, intake and output, and last pain medication and reversal medications were reviewed.

## 2019-11-11 NOTE — PROGRESS NOTES
Orthopaedics -  Unfortunately, after the conclusion of the procedure, one of the reps not in attendance informed me that we had been given a tibial tray with nickel content. The femoral component is titanium and fine. I discussed options, including doing nothing, with the patient and family and they would like to remove the tibial tray and replace it with a titanium tray. We will plan to do that as soon as a room is ready.

## 2019-11-11 NOTE — ANESTHESIA POSTPROCEDURE EVALUATION
Procedure(s):  RE-ENTRY RIGHT TOTAL KNEE ARTHROPLASTY WITH NAVIGATION (GEN WITH BLOCK) (LATEX ALLERGY) (NICKEL ALLERGY). general, total IV anesthesia    Anesthesia Post Evaluation        Patient location during evaluation: PACU  Note status: Adequate. Level of consciousness: responsive to verbal stimuli and sleepy but conscious  Pain management: satisfactory to patient  Airway patency: patent  Anesthetic complications: no  Cardiovascular status: acceptable  Respiratory status: acceptable  Hydration status: acceptable  Comments: +Post-Anesthesia Evaluation and Assessment    Patient: Joel Fagan MRN: [id number]  SSN: [id number]   YOB: 1955  Age: 75 y.o. Sex: female      Cardiovascular Function/Vital Signs    /62   Pulse 72   Temp 36.6 °C (97.8 °F)   Resp 16   Ht 5' 3\" (1.6 m)   Wt 72.3 kg (159 lb 6.3 oz)   SpO2 98%   BMI 28.24 kg/m²     Patient is status post Procedure(s):  RE-ENTRY RIGHT TOTAL KNEE ARTHROPLASTY WITH NAVIGATION (GEN WITH BLOCK) (LATEX ALLERGY) (NICKEL ALLERGY). Nausea/Vomiting: Controlled. Postoperative hydration reviewed and adequate. Pain:  Pain Scale 1: Numeric (0 - 10) (11/11/19 1430)  Pain Intensity 1: 0 (11/11/19 1430)   Managed. Neurological Status:   Neuro (WDL): Exceptions to WDL (11/11/19 1230)   At baseline. Mental Status and Level of Consciousness: Arousable. Pulmonary Status:   O2 Device: Nasal cannula (11/11/19 1430)   Adequate oxygenation and airway patent. Complications related to anesthesia: None    Post-anesthesia assessment completed. No concerns. Signed By: Zena Mackenzie MD    11/11/2019  Post anesthesia nausea and vomiting:  controlled      Vitals Value Taken Time   /62 11/11/2019  2:35 PM   Temp 36.6 °C (97.8 °F) 11/11/2019  2:30 PM   Pulse 62 11/11/2019  2:41 PM   Resp 12 11/11/2019  2:41 PM   SpO2 100 % 11/11/2019  2:41 PM   Vitals shown include unvalidated device data.

## 2019-11-11 NOTE — H&P
Silviano Maya MD - Adult Reconstruction and Total Joint Replacement     Orthopaedic History and Physical        NAME: Vidhi Ragsdale       :  1955       MRN:  [id number]          Subjective:   Patient ID: Codey Jones is a 75 y.o. female. Chief Complaint: Follow-up of the Right Knee    Mrs. Chris Red presents today to discuss the results of her 10/10/19 MRI. She reports severe pain constantly in her right knee. She states that any activity exacerbates the pain, and is dull and achy at rest. She states that Tylenol does not provide significant relief. Of note, she has a history of left TKA and is pleased with her prosthetic and recovery. She also reports a nickel allergy. She also reports a history of AFIB, has had an ablation a few years back, and takes Eliquis. She reports no other cardiac or medical issues.     Patient Active Problem List    Diagnosis Date Noted    Status post total knee replacement 2019    S/P ablation of atrial fibrillation 2017    Primary localized osteoarthrosis, lower leg 2014    GI bleed 10/02/2013     Past Medical History:   Diagnosis Date    Coagulation disorder ([address]     takes Eliquis    Depression     GERD (gastroesophageal reflux disease)     H/O hammer toe correction     Nausea & vomiting     Osteoarthritis     knee, thumb    Osteoporosis     Psychiatric disorder     anxiety, seasonal affective disorder    PUD (peptic ulcer disease)     required blood transfusion    SVT (supraventricular tachycardia) ([address]     Afib takes Eliquis      Past Surgical History:   Procedure Laterality Date    CARDIAC SURG PROCEDURE UNLIST  2017    Cardiac Ablation    DILATE ESOPHAGUS  2018         HX BREAST BIOPSY Right     pt was 21or 19years old negative    HX CATARACT REMOVAL      bilateral    HX GYN      HX HEENT      L pupil does not react; h/o bilateral retina surgery/detachment    HX HYSTERECTOMY      HX KNEE REPLACEMENT Left     HX ORTHOPAEDIC Bilateral     left thumb surgery due to OA    HX ORTHOPAEDIC Right     right hammer toe repair with screw    HX RETINAL DETACHMENT REPAIR      bilateral     PA EGD TRANSORAL CONTROL BLEEDING ANY METHOD  10/2/2013         UPPER GI ENDOSCOPY,BIOPSY  6/27/2018           Prior to Admission medications    Medication Sig Start Date End Date Taking? Authorizing Provider   sertraline (ZOLOFT) 50 mg tablet Take 50 mg by mouth two (2) times a day. Yes Provider, Historical   prednisoLONE acetate (PRED FORTE) 1 % ophthalmic suspension Administer 1 Drop to both eyes four (4) times daily. Yes Provider, Historical   pantoprazole (PROTONIX) 40 mg tablet Take 40 mg by mouth daily. Yes Provider, Historical   flecainide (TAMBOCOR) 50 mg tablet Take 50 mg by mouth two (2) times a day. Yes Provider, Historical   folic acid/multivit-min/lutein (SPECTRAVITE ADULT 50 PLUS,LUT, PO) Take 1 Tab by mouth daily. Provider, Historical   zolpidem (AMBIEN) 5 mg tablet Take 5 mg by mouth nightly as needed for Sleep. 1/2 tab at night as needed    Provider, Historical   alendronate (FOSAMAX) 70 mg tablet Take  by mouth every seven (7) days. Provider, Historical   apixaban (ELIQUIS PO) Take 5 mg by mouth two (2) times a day. Provider, Historical   fish oil-omega-3 fatty acids 340-1,000 mg capsule Take 1 Cap by mouth daily. Provider, Historical   conjugated estrogens (PREMARIN) 0.625 mg/gram vaginal cream Insert 0.5 g into vagina daily. Provider, Historical   Calcium-Cholecalciferol, D3, (CALCIUM 600 WITH VITAMIN D3) 600 mg(1,500mg) -400 unit cap Take 2 Caps by mouth daily.     Provider, Historical     Allergies   Allergen Reactions    Latex Rash    Adhesive Tape-Silicones Rash    Erythromycin Rash    Neomycin Rash    Nickel Rash    Nsaids (Non-Steroidal Anti-Inflammatory Drug) Other (comments)     Bleeding ulcers       Social History     Tobacco Use    Smoking status: Never Smoker  Smokeless tobacco: Never Used   Substance Use Topics    Alcohol use: Yes     Alcohol/week: 5.0 standard drinks     Types: 5 Glasses of wine per week     Comment: 5 per week      Family History   Problem Relation Age of Onset    Heart Disease Mother     Kidney Disease Mother     Heart Disease Father     Hypertension Father         REVIEW OF SYSTEMS: A comprehensive review of systems was negative except for that written in the HPI. Objective:   Constitutional: She appears stated age. Pt is cooperative and is in no acute distress. Well nourished. Well developed. Body habitus is overweight. Body mass index is 28.08 kg/m². Assistive devices: none  Eyes: Sclera are nonicteric. Respiratory: No labored breathing. Cardiovascular: No marked edema. Well perfused extremities bilaterally. Skin: No marked skin ulcers. No lymphedema or skin abnormalities. Neurological: No marked sensory loss noted. Grossly neurovascularly intact. Both lower extremities are intact to distal sensory and motor function. Psychiatric: Alert and oriented x3. MUSCULOSKELETAL: Lumbar spine is nonfocal. No obvious LLD. 5/5 BLE strength. Benign left knee incision. She does have some slight laxity in this knee but is asymptomatic. Full extension and flexion greater than 120 degrees on the left. Right knee has primarily medial joint line tenderness but some lateral or tenderness as well. Mild effusion. No instability. Full extension flexion greater than 115 degrees. Good quad strength.     Radiographs:         Mri Knee Right Without Contrast ([id number])    Result Date: 10/10/2019  Eric Gardiner - \[facility] [place] [facility]\"" - MRI KNEE RT WO CONT Patient: Ashley Echevarria : 1955 Date of Service: 10/10/2019 9:05:25 AM Reason For Exam: Localized osteoarthritis of right knee Ordering Provider: Marsha Huff Physician: Richard Coreas Signing Date: 10/10/2019 12:23:46 PM EXAM: MRI KNEE RT WO CONT INDICATION: Right knee osteoarthritis COMPARISON: None TECHNIQUE: Axial T2 fat-saturated and proton density fat-saturated; coronal T1 and proton density fat-saturated; and sagittal T2 fat-saturated, proton density fat-saturated, and gradient echo MRI of the right knee . CONTRAST: None. FINDINGS: ACL and PCL: Intact. Collateral ligaments and posterior, lateral corner: Intact. Extensor mechanism: Intact. . Patellofemoral alignment: No patellar subluxation/tilt. Trochlear groove is not hypoplastic. TT-TG distance: 7 mm Joint fluid: Small joint effusion with multiple loose bodies within the posterior intercondylar notch. There is a moderate-sized joint effusion with complex synovitis and possibly a small amount of hemorrhage within it. Medial meniscus: Large complex undersurface tear posterior horn with displaced meniscal tissue into the notch. Body is subluxed from the joint Lateral meniscus: Degenerative undersurface tear body lateral meniscus extending to the junctions of both the anterior and posterior horns Articular cartilage: 5 x 5 mm full-thickness cartilage defect central posterior weightbearing surface lateral femoral condyle Diffuse thinning of the weightbearing and posterior weightbearing surface medial femoral condyle with high-grade partial-thickness cartilage loss posterior weightbearing surface. There is thinning of the medial tibial plateau cartilage with focal full-thickness fissuring at its peripheral rim. There is full-thickness cartilage loss central patella with delamination at its lateral margin. Bone marrow: There is tricompartmental degenerative spurring with subchondral degenerative changes medially. Soft tissue mass: None. IMPRESSION: 1 . Large complex undersurface tear posterior horn medial meniscus with displaced meniscal tissue into the notch. Body is subluxed from the joint 2. Degenerative undersurface tear body lateral meniscus extending to the junctions of the anterior and posterior horns 3.  Tricompartmental cartilage loss most severe medially 4. Multiple loose bodies in the posterior intercondylar notch 5. Moderate-sized complex Baker's cyst which appears to contain hemorrhage Signing date/time: 10/10/2019 12:23 PM Signed by: Perla Woodard V       Assessment:     ICD-10-CM   1. Localized osteoarthritis of right knee M17.11   2. Status post total left knee replacement Z96.652       Plan: At this time, I recommended a right TKA. All questions were answered to her satisfaction. Follow up as needed. We will wait for permission from her cardiologist to stop taking Eliquis before surgery. Conservative treatments including activity modification, medication, and steroid injections have not successfully relieved pain. Surgery was discussed at length with the pt today. We went over all the pertinent risks, benefits, and alternatives to the procedure. They understand no guarantees can be made about the outcome and they would like to proceed. I discussed general recovery timeline with the pt.  We will plan for the arthroplasty after she receives clearance from her cardiologist.    Scribed for Dr. Damien Alpers by IRENE Humphries

## 2019-11-11 NOTE — H&P
Date of Surgery Update:  Debbie Hermosillo was seen and examined on the day of surgery prior to the procedure. There were no significant clinical changes since the completion of the History and Physical.    Exam today prior to surgery showed no acute cardiac findings, no respiratory difficulty, and no abdominal complaints or pain. This patient is not a candidate for TXA. Documentation of Medical Necessity:    Symptoms: pain with activity and at rest, antalgia, interferes with ADLs    Conservative Treatment: activity modification, multiple medications, injection    Physical Findings: varus, pain at joint line, antalgia on ambulation, crepitation    Imaging: MRI showing significant OA, sclerosis and osteophytes, varus    Indications:   Failure of conservative treatments with daily pain and functional limitations. Appropriate imaging demonstrating significant disease. Appropriate physical findings consistent with significant degenerative joint disease. All pertinent risks, benefits, and alternatives to operative management including continued conservative care were explained at length. The patient has elected to proceed with appropriately indicated and medically necessary total joint arthroplasty. They understand no guarantees can be given about the outcome. *They will be admitted as an inpatient because of the need for intensive therapy and post-anesthesia monitoring.     Signed By: IRENE Castro     November 11, 2019 7:54 AM

## 2019-11-11 NOTE — PERIOP NOTES
[id number] Report from Operating Room to PACU    Report received from JAC Maki [address] and [address] regarding Benson Lemus.      Surgeon(s):  Alejandro Eng MD  And Procedure(s) (LRB):  RIGHT TOTAL KNEE ARTHROPLASTY WITH NAVIGATION (GEN WITH BLOCK) (LATEX ALLERGY) (NICKEL ALLERGY) (Right)  confirmed   with allergies and dressings discussed. Anesthesia type, drugs, patient history, complications, estimated blood loss, vital signs, intake and output, and last pain medication, lines, reversal medications and temperature were reviewed. 0930- Family updated. 56- Dr. Thuy Vasquez at bedside discussing taking pt back to the OR to replace non-titanium part of her knee. Pt states she wants to fix it now. Will keep pt NPO until MD and OR are available. 80- Pt's  signed consent d/t pt having had anesthesia today. 1242- OR team at bedside to take pt back to OR.

## 2019-11-11 NOTE — PROGRESS NOTES
TRANSFER - IN REPORT:    Verbal report received from Paty Cochran RN(name) on Paralee Ripa  being received from Yovana RN(unit) for routine post - op      Report consisted of patients Situation, Background, Assessment and   Recommendations(SBAR). Information from the following report(s) SBAR, Kardex, OR Summary, Procedure Summary, Intake/Output, MAR and Accordion was reviewed with the receiving nurse. Opportunity for questions and clarification was provided. Assessment completed upon patients arrival to unit and care assumed.

## 2019-11-11 NOTE — ANESTHESIA PREPROCEDURE EVALUATION
Relevant Problems   No relevant active problems   Anesthetic History     PONV          Review of Systems / Medical History  Patient summary reviewed, nursing notes reviewed and pertinent labs reviewed    Pulmonary  Within defined limits                 Neuro/Psych         Psychiatric history     Cardiovascular            Dysrhythmias : SVT and atrial fibrillation      Exercise tolerance: >4 METS     GI/Hepatic/Renal     GERD      PUD     Endo/Other        Arthritis     Other Findings              Physical Exam    Airway  Mallampati: II  TM Distance: 4 - 6 cm  Neck ROM: normal range of motion   Mouth opening: Normal     Cardiovascular  Regular rate and rhythm,  S1 and S2 normal,  no murmur, click, rub, or gallop             Dental  No notable dental hx       Pulmonary  Breath sounds clear to auscultation               Abdominal  GI exam deferred       Other Findings            Anesthetic Plan    ASA: 2  Anesthesia type: total IV anesthesia and MAC          Induction: Intravenous  Anesthetic plan and risks discussed with: Patient          Anesthetic History     PONV          Review of Systems / Medical History  Patient summary reviewed, nursing notes reviewed and pertinent labs reviewed    Pulmonary  Within defined limits                 Neuro/Psych         Psychiatric history     Cardiovascular            Dysrhythmias : SVT and atrial fibrillation      Exercise tolerance: >4 METS     GI/Hepatic/Renal     GERD      PUD     Endo/Other        Arthritis     Other Findings              Physical Exam    Airway  Mallampati: II  TM Distance: 4 - 6 cm  Neck ROM: normal range of motion   Mouth opening: Normal     Cardiovascular  Regular rate and rhythm,  S1 and S2 normal,  no murmur, click, rub, or gallop             Dental  No notable dental hx       Pulmonary  Breath sounds clear to auscultation               Abdominal  GI exam deferred       Other Findings            Anesthetic Plan    ASA: 2  Anesthesia type: total IV anesthesia and MAC          Induction: Intravenous  Anesthetic plan and risks discussed with: Patient          Anesthetic History     PONV          Review of Systems / Medical History  Patient summary reviewed, nursing notes reviewed and pertinent labs reviewed    Pulmonary  Within defined limits                 Neuro/Psych         Psychiatric history     Cardiovascular            Dysrhythmias       Exercise tolerance: >4 METS     GI/Hepatic/Renal     GERD           Endo/Other        Arthritis     Other Findings              Physical Exam    Airway  Mallampati: II  TM Distance: 4 - 6 cm  Neck ROM: normal range of motion   Mouth opening: Normal     Cardiovascular  Regular rate and rhythm,  S1 and S2 normal,  no murmur, click, rub, or gallop             Dental  No notable dental hx       Pulmonary  Breath sounds clear to auscultation               Abdominal  GI exam deferred       Other Findings            Anesthetic Plan    ASA: 3  Anesthesia type: general    Monitoring Plan: BIS      Induction: Intravenous  Anesthetic plan and risks discussed with: Patient              Anesthetic History     PONV          Review of Systems / Medical History  Patient summary reviewed, nursing notes reviewed and pertinent labs reviewed    Pulmonary  Within defined limits                 Neuro/Psych         Psychiatric history and dementia    Comments: Depression  Anxiety  Seasonal Affective Disorder Cardiovascular            Dysrhythmias : atrial fibrillation and SVT      Exercise tolerance: >4 METS  Comments: S/P A-fib Ablation (8/31/17)  S/P A-Flutter Ablation (6/1/17)    TTE (4/14/17):   Mild MR, mild TI, EF=60-65%    Taking Apixaban (Eliquis)   GI/Hepatic/Renal     GERD      PUD     Endo/Other        Arthritis    Comments: Right Knee OA  S/P Left TKR Other Findings   Comments: S/P Right TKR today (11/11/19) - patient with nickel allergy - prosthesis replacement to be replaced    Osteoporosis           Physical Exam    Airway  Mallampati: II  TM Distance: 4 - 6 cm  Neck ROM: normal range of motion   Mouth opening: Normal     Cardiovascular  Regular rate and rhythm,  S1 and S2 normal,  no murmur, click, rub, or gallop             Dental    Dentition: Caps/crowns     Pulmonary  Breath sounds clear to auscultation               Abdominal  GI exam deferred       Other Findings            Anesthetic Plan    ASA: 3  Anesthesia type: general and total IV anesthesia    Monitoring Plan: BIS  Post-op pain plan if not by surgeon: peripheral nerve block single    Induction: Intravenous  Anesthetic plan and risks discussed with: Patient

## 2019-11-12 ENCOUNTER — HOME HEALTH ADMISSION (OUTPATIENT)
Dept: HOME HEALTH SERVICES | Facility: HOME HEALTH | Age: 64
End: 2019-11-12
Payer: MEDICARE

## 2019-11-12 LAB
ANION GAP SERPL CALC-SCNC: 6 MMOL/L (ref 5–15)
BUN SERPL-MCNC: 13 MG/DL (ref 6–20)
BUN/CREAT SERPL: 16 (ref 12–20)
CALCIUM SERPL-MCNC: 7.9 MG/DL (ref 8.5–10.1)
CHLORIDE SERPL-SCNC: 103 MMOL/L (ref 97–108)
CO2 SERPL-SCNC: 25 MMOL/L (ref 21–32)
CREAT SERPL-MCNC: 0.82 MG/DL (ref 0.55–1.02)
GLUCOSE SERPL-MCNC: 114 MG/DL (ref 65–100)
HGB BLD-MCNC: 10.1 G/DL (ref 11.5–16)
POTASSIUM SERPL-SCNC: 4 MMOL/L (ref 3.5–5.1)
SODIUM SERPL-SCNC: 134 MMOL/L (ref 136–145)

## 2019-11-12 PROCEDURE — 74011250636 HC RX REV CODE- 250/636: Performed by: PHYSICIAN ASSISTANT

## 2019-11-12 PROCEDURE — 85018 HEMOGLOBIN: CPT

## 2019-11-12 PROCEDURE — 74011250637 HC RX REV CODE- 250/637: Performed by: PHYSICIAN ASSISTANT

## 2019-11-12 PROCEDURE — 36415 COLL VENOUS BLD VENIPUNCTURE: CPT

## 2019-11-12 PROCEDURE — 97116 GAIT TRAINING THERAPY: CPT

## 2019-11-12 PROCEDURE — 97530 THERAPEUTIC ACTIVITIES: CPT

## 2019-11-12 PROCEDURE — 80048 BASIC METABOLIC PNL TOTAL CA: CPT

## 2019-11-12 PROCEDURE — 97162 PT EVAL MOD COMPLEX 30 MIN: CPT

## 2019-11-12 PROCEDURE — 51798 US URINE CAPACITY MEASURE: CPT

## 2019-11-12 PROCEDURE — 65270000029 HC RM PRIVATE

## 2019-11-12 PROCEDURE — 97110 THERAPEUTIC EXERCISES: CPT

## 2019-11-12 RX ORDER — ONDANSETRON 4 MG/1
4 TABLET, ORALLY DISINTEGRATING ORAL
Status: DISCONTINUED | OUTPATIENT
Start: 2019-11-12 | End: 2019-11-13 | Stop reason: HOSPADM

## 2019-11-12 RX ORDER — CELECOXIB 200 MG/1
200 CAPSULE ORAL DAILY
Status: DISCONTINUED | OUTPATIENT
Start: 2019-11-13 | End: 2019-11-13 | Stop reason: HOSPADM

## 2019-11-12 RX ADMIN — DOCUSATE SODIUM - SENNOSIDES 1 TABLET: 50; 8.6 TABLET, FILM COATED ORAL at 17:42

## 2019-11-12 RX ADMIN — FLECAINIDE ACETATE 50 MG: 100 TABLET ORAL at 17:42

## 2019-11-12 RX ADMIN — SODIUM CHLORIDE 125 ML/HR: 900 INJECTION, SOLUTION INTRAVENOUS at 02:45

## 2019-11-12 RX ADMIN — Medication 10 ML: at 22:00

## 2019-11-12 RX ADMIN — POLYETHYLENE GLYCOL (3350) 17 G: 17 POWDER, FOR SOLUTION ORAL at 08:20

## 2019-11-12 RX ADMIN — OXYCODONE HYDROCHLORIDE 10 MG: 5 TABLET ORAL at 04:57

## 2019-11-12 RX ADMIN — ONDANSETRON 4 MG: 2 INJECTION INTRAMUSCULAR; INTRAVENOUS at 08:19

## 2019-11-12 RX ADMIN — PREDNISOLONE ACETATE 1 DROP: 10 SUSPENSION/ DROPS OPHTHALMIC at 17:43

## 2019-11-12 RX ADMIN — PANTOPRAZOLE SODIUM 40 MG: 40 TABLET, DELAYED RELEASE ORAL at 07:34

## 2019-11-12 RX ADMIN — PREDNISOLONE ACETATE 1 DROP: 10 SUSPENSION/ DROPS OPHTHALMIC at 10:31

## 2019-11-12 RX ADMIN — DOCUSATE SODIUM - SENNOSIDES 1 TABLET: 50; 8.6 TABLET, FILM COATED ORAL at 08:19

## 2019-11-12 RX ADMIN — ONDANSETRON 4 MG: 2 INJECTION INTRAMUSCULAR; INTRAVENOUS at 04:03

## 2019-11-12 RX ADMIN — OXYCODONE HYDROCHLORIDE 10 MG: 5 TABLET ORAL at 16:50

## 2019-11-12 RX ADMIN — ACETAMINOPHEN 650 MG: 325 TABLET ORAL at 05:00

## 2019-11-12 RX ADMIN — OXYCODONE HYDROCHLORIDE 10 MG: 5 TABLET ORAL at 11:41

## 2019-11-12 RX ADMIN — Medication 10 ML: at 15:33

## 2019-11-12 RX ADMIN — APIXABAN 5 MG: 5 TABLET, FILM COATED ORAL at 17:42

## 2019-11-12 RX ADMIN — FLECAINIDE ACETATE 50 MG: 100 TABLET ORAL at 08:21

## 2019-11-12 RX ADMIN — OXYCODONE HYDROCHLORIDE 10 MG: 5 TABLET ORAL at 00:51

## 2019-11-12 RX ADMIN — ACETAMINOPHEN 650 MG: 325 TABLET ORAL at 11:41

## 2019-11-12 RX ADMIN — ACETAMINOPHEN 650 MG: 325 TABLET ORAL at 17:41

## 2019-11-12 RX ADMIN — OXYCODONE HYDROCHLORIDE 10 MG: 5 TABLET ORAL at 08:21

## 2019-11-12 RX ADMIN — APIXABAN 5 MG: 5 TABLET, FILM COATED ORAL at 08:21

## 2019-11-12 RX ADMIN — DEXAMETHASONE SODIUM PHOSPHATE 10 MG: 4 INJECTION, SOLUTION INTRAMUSCULAR; INTRAVENOUS at 08:20

## 2019-11-12 RX ADMIN — SERTRALINE HYDROCHLORIDE 50 MG: 50 TABLET ORAL at 17:42

## 2019-11-12 RX ADMIN — SERTRALINE HYDROCHLORIDE 50 MG: 50 TABLET ORAL at 08:22

## 2019-11-12 RX ADMIN — OXYCODONE HYDROCHLORIDE 5 MG: 5 TABLET ORAL at 22:05

## 2019-11-12 NOTE — ANESTHESIA POSTPROCEDURE EVALUATION
Procedure(s):  RIGHT TOTAL KNEE ARTHROPLASTY WITH NAVIGATION (GEN WITH BLOCK) (LATEX ALLERGY) (NICKEL ALLERGY). general    Anesthesia Post Evaluation        Patient location during evaluation: PACU  Note status: Adequate. Level of consciousness: responsive to verbal stimuli and sleepy but conscious  Pain management: satisfactory to patient  Airway patency: patent  Anesthetic complications: no  Cardiovascular status: acceptable  Respiratory status: acceptable  Hydration status: acceptable  Comments: +Post-Anesthesia Evaluation and Assessment    Patient: Brennen Berg MRN: [id number]  SSN: [id number]   YOB: 1955  Age: 75 y.o. Sex: female      Cardiovascular Function/Vital Signs    BP 99/63   Pulse 63   Temp 36.6 °C (97.9 °F)   Resp 18   Ht 5' 3\" (1.6 m)   Wt 72.3 kg (159 lb 6.3 oz)   SpO2 94%   BMI 28.24 kg/m²     Patient is status post Procedure(s):  RIGHT TOTAL KNEE ARTHROPLASTY WITH NAVIGATION (GEN WITH BLOCK) (LATEX ALLERGY) (NICKEL ALLERGY). Nausea/Vomiting: Controlled. Postoperative hydration reviewed and adequate. Pain:  Pain Scale 1: Numeric (0 - 10) (11/12/19 0537)  Pain Intensity 1: 7 (11/12/19 0537)   Managed. Neurological Status:   Neuro (WDL): Within Defined Limits (11/11/19 1514)   At baseline. Mental Status and Level of Consciousness: Arousable. Pulmonary Status:   O2 Device: Room air (11/12/19 0033)   Adequate oxygenation and airway patent. Complications related to anesthesia: None    Post-anesthesia assessment completed. No concerns. Signed By: Janel Sandoval MD    11/12/2019  Post anesthesia nausea and vomiting:  controlled      Vitals Value Taken Time   /73 11/11/2019 12:00 PM   Temp 36.4 °C (97.5 °F) 11/11/2019 12:30 PM   Pulse 75 11/11/2019 12:38 PM   Resp 17 11/11/2019 12:38 PM   SpO2 100 % 11/11/2019 12:38 PM   Vitals shown include unvalidated device data.

## 2019-11-12 NOTE — PROGRESS NOTES
Pt has not voided. Pt states she has not voided since prior to first surgery around 0700 this morning. Bladder scan performed, showing 399 mL. Paged on-call doctor For Dr. Luis Enrique Mckeon, spoke with Dr. Hanane Macias, who ordered to insert Earl until morning.

## 2019-11-12 NOTE — PROGRESS NOTES
Ortho / Neurosurgery NP Note    POD# 1  s/p RE-ENTRY RIGHT TOTAL KNEE ARTHROPLASTY WITH NAVIGATION (GEN WITH BLOCK) (LATEX ALLERGY) (NICKEL ALLERGY)   Pt seen with  at bedside    Pt resting in bed. Complaints of pain - aware to ask for PRN medications. She and  vocal about not being happy with pre-op class. Offered to answer remaining questions. Pt c/p PONV - scopolamine patch in  Place and admits to relief with Zofran. Poor appetite. VSS Afebrile. Except BP low 94/47 - removed scope patch. Voiding status: reilly reinserted last night - will see how she mobilizes today. Output (mL)  Emesis: 200 mL (11/12/19 0818)  Last Bowel Movement Date: 11/11/19 (11/11/19 2041)  Unmeasurable Output  Urine Occurrence(s): 1(voided in bathroom  ) (11/11/19 0750)  Stool Occurrence(s): 1(bm in bathroom  ) (11/11/19 0715)      Labs  Lab Results   Component Value Date/Time    HGB 10.1 (L) 11/12/2019 01:48 AM      Lab Results   Component Value Date/Time    INR 1.0 11/04/2019 11:01 AM        Recent Glucose Results:   Lab Results   Component Value Date/Time     (H) 11/12/2019 01:48 AM         Body mass index is 28.24 kg/m². : A BMI > 30 is classified as obesity and > 40 is classified as morbid obesity. Prineo Dressing c.d.i  Cryotherapy in place over incision  Calves soft and supple; No pain with passive stretch  Sensation and motor intact  SCDs for mechanical DVT proph while in bed     PLAN:  1) PT BID; revision in same day due to nickel allergy. TTWB  2) Eliquis for DVT Prophylaxis   3) GI Prophylaxis - Protonix  4) Readniess for discharge:     [x] Vital Signs stable    [x] Hgb stable    [] + Voiding - reilly removed overnight and then reinserted. Will remove later today if mobile with PT. [x] Wound intact, drainage minimal    [] Tolerating PO intake  - poor intake - advised to focus on Ensure.    [] Cleared by PT (OT if applicable)     [] Stair training completed (if applicable)    [] Independent / Contact Guard Assist (household distance)     [] Bed mobility     [] Car transfers     [] ADLs    [x] Adequate pain control on oral medication alone     Plan home when clears PT - possibly today vs tomorrow      Elsi Roman NP  DNP, ACNP-BC, ONP-C

## 2019-11-12 NOTE — PROGRESS NOTES
Bedside and Verbal shift change report given to [address] [place] [address] (oncoming nurse) by Migdalia Linares (offgoing nurse). Report included the following information SBAR, Kardex, Procedure Summary, Intake/Output, MAR, Accordion and Recent Results.

## 2019-11-12 NOTE — PROGRESS NOTES
CURT: home with home health     Reason for Admission: right total knee arthroplasty                      RRAT Score:  10                   Plan for utilizing home health:  [facility]                        Current Advanced Directive/Advance Care Plan: on file and correct                          Transition of Care Plan:                      Patient is a 74 y/o female who was admitted to [facility] for a planned right total knee arthroplasty. CM made room visit with patient who was alert and oriented with  at bedside. Patient confirmed demographics (patient requested to take out home phone number, CM made corrections to chart), insurance, and emergency contact on file. Patient's PCP is Dr. Junaid Sorensen and was last seen 2 wks ago. Patient [facility] on [address]. Patient and spouse reside in a three story home (including finished basement), master bedroom is on the third floor (enter home on second floor) with 7 entry steps. Patient has DME including forearm crutches, cane, RW, built in shower chair. Prior to admission patient was independent with ALDs, IADLs, and driving. Patient has used [facility] in the past but unsure of agency used. Patient denied any history of SNF or IPR. Patient's plan is to return home with home health. Patient requested to use [facility]. FOC signed and on bedside chart. Referral sent to [facility] and they accepted patient for [facility] services. AVS updated. Patient's  top provide transportation upon d/c. Patient cleared for d/c from CM standpoint. Care Management Interventions  PCP Verified by CM: Yes  Mode of Transport at Discharge:  Other (see comment)  Transition of Care Consult (CM Consult): Discharge Planning, [address]: Yes  Discharge Durable Medical Equipment: No  Physical Therapy Consult: Yes  Occupational Therapy Consult: No  Speech Therapy Consult: No  Current Support Network: Lives with Spouse, Own Home, Family Lives Nearby  Confirm Follow Up Transport: Family  Plan discussed with Pt/Family/Caregiver: Yes  Freedom of Choice Offered: Yes  Discharge Location  Discharge Placement: Home with home health    Anamika Holder, [address]

## 2019-11-12 NOTE — PROGRESS NOTES
Bedside and Verbal shift change report given to [address] [place] [address] (oncoming nurse) by Mohit Muniz (offgoing nurse). Report included the following information SBAR, Kardex, Procedure Summary, Intake/Output, MAR, Accordion and Recent Results.

## 2019-11-12 NOTE — PROGRESS NOTES
Problem: Mobility Impaired (Adult and Pediatric)  Goal: *Acute Goals and Plan of Care (Insert Text)  Description  FUNCTIONAL STATUS PRIOR TO ADMISSION: Patient was modified independent using a walker and lofstrand crutches for functional mobility. HOME SUPPORT PRIOR TO ADMISSION: The patient lived with spouse but did not require assist. Lives in 2 story home with 7 steps to enter with 1 rail on L. 13 steps to upstairs bedroom and walk-in shower. Spouse will assist as needed post hospitalization. Physical Therapy Goals  Initiated 11/12/2019    1. Patient will move from supine to sit and sit to supine , scoot up and down and roll side to side in bed with independence within 4 days. 2. Patient will perform sit to stand with modified independence within 4 days. 3. Patient will ambulate with modified independence for 125 feet with the least restrictive device within 4 days. 4. Patient will ascend/descend 7 stairs with 1 handrail(s) and 1 crutch with minimal assistance/contact guard assist within 4 days. 5. Patient will perform home exercise program per protocol with supervision/set-up within 4 days. 6. Patient will demonstrate AROM 0-90 degrees in operative joint within 4 days. Note:   PHYSICAL THERAPY TREATMENT  Patient: Ovidio Rodgers (2 y.o. female)  Date: 11/12/2019  Diagnosis: Status post total knee replacement [Z96.659]     Procedure(s) (LRB):  RE-ENTRY RIGHT TOTAL KNEE ARTHROPLASTY WITH NAVIGATION (GEN WITH BLOCK) (LATEX ALLERGY) (NICKEL ALLERGY) (Right) 1 Day Post-Op    Precautions:  Fall, (RLE TTWB)  Chart, physical therapy assessment, plan of care and goals were reviewed.     ASSESSMENT  Based on the objective data described below pt progressing slow towards goals, is not safe and does not like her Lofstrand crutches, did fair with one crutch and hand rail on stairs but needs more trng in am before d/c, much more stable with RW, does fair with bed mob and transfers, vc's for safety and proper RW use. Current Level of Function Impacting Discharge (mobility/balance): Contact guard assistance;Assist x1/ min assist on stairs         PLAN :  Patient continues to benefit from skilled intervention to address the above impairments. Continue treatment per established plan of care. to address goals. Recommendation for discharge: (in order for the patient to meet his/her long term goals)  Physical therapy at least 2 days/week in the home     This discharge recommendation:  Has been made in collaboration with the attending provider and/or case management    IF patient discharges home will need the following DME: has rolling walker     OBJECTIVE DATA SUMMARY:     Critical Behavior:  Neurologic State: Alert  Orientation Level: Oriented X4  Cognition: Follows commands     Range of Motion:  PROM: Within functional limits(except R knee 0 - 65 degrees)    Functional Mobility Training:  Bed Mobility:  Rolling: Stand-by assistance  Supine to Sit: Stand-by assistance  Scooting: Stand-by assistance  Level of Assistance: Stand-by assistance  Interventions: Verbal cues    Transfers:  Sit to Stand: Contact guard assistance;Assist x1  Stand to Sit: Contact guard assistance;Assist x1  Bed to Chair: Contact guard assistance;Assist x1;Additional time  Interventions: Tactile cues; Verbal cues  Level of Assistance: Contact guard assistance    Balance:  Sitting: Intact; Without support  Standing: Impaired; With support  Standing - Static: Fair;Constant support  Standing - Dynamic : Fair;Constant support    Ambulation/Gait Training:  Distance (ft): 25 Feet (ft)  Assistive Device: Walker, rolling;Gait belt;Crutches  Ambulation - Level of Assistance: Contact guard assistance;Assist x1  Gait Description (WDL): Exceptions to WDL  Gait Abnormalities: Antalgic;Decreased step clearance; Step to gait  Right Side Weight Bearing: Toe touch  Left Side Weight Bearing: Full  Base of Support: Narrowed; Center of gravity altered  Speed/Marilee: Slow  Step Length: Left shortened    Stairs:  Number of Stairs Trained: 4  Stairs - Level of Assistance: Minimum assistance;Assist X1;Additional time   Rail Use: Left     Therapeutic Exercises:   sitting  EXERCISE   Sets   Reps   Active Active Assist   Passive   Comments   Ankle pumps 1 10 ? ? ? bilat   Heel raises 1 10 ? ? ? \"   Toe tap 1 10 ? ? ? \"   Knee ext 1 10 ? ? ? \"   Hip flex 1 10 ? ? ? \"     Pain Rating: see flow sheet    Activity Tolerance: Poor    After treatment patient left in no apparent distress: Sitting in chair and Call bell within reach    COMMUNICATION/COLLABORATION:   The patients plan of care was discussed with: Registered Nurse    Tete Sutton PTA   Time Calculation: 30 mins

## 2019-11-12 NOTE — PROGRESS NOTES
Problem: Falls - Risk of  Goal: *Absence of Falls  Description  Document Levi Estrada Fall Risk and appropriate interventions in the flowsheet. Outcome: Progressing Towards Goal  Note:   Fall Risk Interventions:  Mobility Interventions: Utilize walker, cane, or other assistive device, Communicate number of staff needed for ambulation/transfer, Patient to call before getting OOB    Mentation Interventions: Door open when patient unattended    Medication Interventions: Evaluate medications/consider consulting pharmacy, Patient to call before getting OOB    Elimination Interventions: Bed/chair exit alarm, Patient to call for help with toileting needs, Call light in reach    History of Falls Interventions:  Investigate reason for fall

## 2019-11-12 NOTE — PROGRESS NOTES
Problem: Mobility Impaired (Adult and Pediatric)  Goal: *Acute Goals and Plan of Care (Insert Text)  Description  FUNCTIONAL STATUS PRIOR TO ADMISSION: Patient was modified independent using a walker and lofstrand crutches for functional mobility. HOME SUPPORT PRIOR TO ADMISSION: The patient lived with spouse but did not require assist. Lives in 2 story home with 7 steps to enter with 1 rail on L. 13 steps to upstairs bedroom and walk-in shower. Spouse will assist as needed post hospitalization. Physical Therapy Goals  Initiated 11/12/2019    1. Patient will move from supine to sit and sit to supine , scoot up and down and roll side to side in bed with independence within 4 days. 2. Patient will perform sit to stand with modified independence within 4 days. 3. Patient will ambulate with modified independence for 125 feet with the least restrictive device within 4 days. 4. Patient will ascend/descend 7 stairs with 1 handrail(s) and 1 crutch with minimal assistance/contact guard assist within 4 days. 5. Patient will perform home exercise program per protocol with supervision/set-up within 4 days. 6. Patient will demonstrate AROM 0-90 degrees in operative joint within 4 days. Outcome: Progressing Towards Goal   PHYSICAL THERAPY EVALUATION  Patient: Xochitl Toney (2 y.o. female)  Date: 11/12/2019  Primary Diagnosis: Status post total knee replacement [Z96.659]  Procedure(s) (LRB):  RE-ENTRY RIGHT TOTAL KNEE ARTHROPLASTY WITH NAVIGATION (GEN WITH BLOCK) (LATEX ALLERGY) (NICKEL ALLERGY) (Right) 1 Day Post-Op   Precautions:  TTWB, Fall(RLE TTWB)      ASSESSMENT  Based on the objective data described below, the patient presents with moderated pain R knee following TKR yesterday with need to redo tibial tray with titanium metal due to nickel allergy. She has decreased R quad strength, decreased ROM 0-70 degrees, wb status limited to TTWB and decreased mobility skills.  She tolerated the TKR exercises well except for the heel slides which were more painful for her. She was able to maintain NWB on RLE rather than TTWB and take steps to chair with cg assistance using walker. VSS thorough out the session - see below for details. Will progress gait training and work on stairs and car transfers this afternoon. Vitals:    11/12/19 0735 11/12/19 1100 11/12/19 1131 11/12/19 1132   BP: 94/47 104/48 108/62 105/62   BP 1 Location:  Left arm Left arm Left arm   BP Patient Position:  At rest;Supine Sitting Standing   Pulse: 68 61 66 75   Resp: 16      Temp: 98.7 °F (37.1 °C)      SpO2: 92% 94% 98%    Weight:       Height:            Current Level of Function Impacting Discharge (mobility/balance): min a x 1 for supine to sit; cg assist for sit to stand and bed to chair transfers using RW with TTWB RLE. Functional Outcome Measure: The patient scored 45/100 on the Barthel outcome measure which is indicative of moderate functional impairment. Other factors to consider for discharge: TTWB RLE, 7 steps to enter home and 13 steps to upstairs bedroom - can stay on 1st floor initially per patient. Patient will benefit from skilled therapy intervention to address the above noted impairments. PLAN :  Recommendations and Planned Interventions: bed mobility training, transfer training, gait training, therapeutic exercises, modalities, edema management/control, patient and family training/education and therapeutic activities      Frequency/Duration: Patient will be followed by physical therapy:  twice daily to address goals. Recommendation for discharge: (in order for the patient to meet his/her long term goals)  Physical therapy at least 2 days/week in the home AND ensure assist and/or supervision for safety with ADLs and mobility.      This discharge recommendation:  Has been made in collaboration with the attending provider and/or case management    IF patient discharges home will need the following DME: patient owns DME required for discharge         SUBJECTIVE:   Patient stated My knee is very stiff.     OBJECTIVE DATA SUMMARY:   HISTORY:    Past Medical History:   Diagnosis Date    Coagulation disorder ([address]     takes Eliquis    Depression     GERD (gastroesophageal reflux disease)     H/O hammer toe correction     Nausea & vomiting     Osteoarthritis     knee, thumb    Osteoporosis     Psychiatric disorder     anxiety, seasonal affective disorder    PUD (peptic ulcer disease) 2015    required blood transfusion    SVT (supraventricular tachycardia) (HCC)     Afib takes Eliquis     Past Surgical History:   Procedure Laterality Date    CARDIAC SURG PROCEDURE UNLIST  2017    Cardiac Ablation    DILATE ESOPHAGUS  6/27/2018         HX BREAST BIOPSY Right     pt was 21or 19years old negative    HX CATARACT REMOVAL      bilateral    HX GYN      HX HEENT  1979    L pupil does not react; h/o bilateral retina surgery/detachment    HX HYSTERECTOMY      HX KNEE REPLACEMENT Left     HX ORTHOPAEDIC Bilateral     left thumb surgery due to OA    HX ORTHOPAEDIC Right     right hammer toe repair with screw    HX RETINAL DETACHMENT REPAIR      bilateral     NV EGD TRANSORAL CONTROL BLEEDING ANY METHOD  10/2/2013         UPPER GI ENDOSCOPY,BIOPSY  6/27/2018            Personal factors and/or comorbidities impacting plan of care: TTWB RLE    Home Situation  # Steps to Enter: 7  Rails to Enter: Yes  Hand Rails : Left  Wheelchair Ramp: No  Tub or Shower Type: Shower    EXAMINATION/PRESENTATION/DECISION MAKING:   Critical Behavior:  Neurologic State: Alert  Orientation Level: Oriented X4  Cognition: Follows commands        Skin:  No drainage visible through dressing  Edema: R knee moderate edema and bruising  Range Of Motion:  AROM: Within functional limits(except R knee 3-/5.)           PROM: Within functional limits(except R knee 0 - 65 degrees)           Strength:    Strength: Generally decreased, functional(R knee 3-/5; R hip 3-/5; R ankle 3/5; LLE 4/5)                    Tone & Sensation:   Tone: Normal              Sensation: Intact               Coordination:  Coordination: Within functional limits  Vision:   Acuity: Within Defined Limits  Functional Mobility:  Bed Mobility:  Rolling: Stand-by assistance  Supine to Sit: Minimum assistance;Assist x1(for RLE only)     Scooting: Stand-by assistance  Transfers:  Sit to Stand: Contact guard assistance;Assist x1  Stand to Sit: Contact guard assistance;Assist x1        Bed to Chair: Contact guard assistance;Assist x1;Adaptive equipment(RW; TTWB RLE)              Balance:   Sitting: Intact  Standing: Impaired  Standing - Static: Constant support;Good  Standing - Dynamic : Fair;Constant support  Ambulation/Gait Training:  Distance (ft): 4 Feet (ft)  Assistive Device: Gait belt;Walker, rolling  Ambulation - Level of Assistance: Contact guard assistance;Assist x1     Gait Description (WDL): Exceptions to WDL  Gait Abnormalities: Step to gait(TTWB RLE)  Right Side Weight Bearing: Toe touch  Left Side Weight Bearing: Full  Base of Support: Narrowed     Speed/Marilee: Slow  Step Length: Right shortened;Left shortened                    Therapeutic Exercises:   Supine - ankle pumps, quad sets, heel slides. All x 10 reps    Functional Measure:  Barthel Index:    Bathin  Bladder: 0(reilly in place)  Bowels: 10  Groomin  Dressin  Feeding: 10  Mobility: 0  Stairs: 0  Toilet Use: 5  Transfer (Bed to Chair and Back): 10  Total: 45/100       The Barthel ADL Index: Guidelines  1. The index should be used as a record of what a patient does, not as a record of what a patient could do. 2. The main aim is to establish degree of independence from any help, physical or verbal, however minor and for whatever reason. 3. The need for supervision renders the patient not independent. 4. A patient's performance should be established using the best available evidence.  Asking the patient, friends/relatives and nurses are the usual sources, but direct observation and common sense are also important. However direct testing is not needed. 5. Usually the patient's performance over the preceding 24-48 hours is important, but occasionally longer periods will be relevant. 6. Middle categories imply that the patient supplies over 50 per cent of the effort. 7. Use of aids to be independent is allowed. Neri Durand., Barthel, D.W. ([id number]). Functional evaluation: the Barthel Index. [facility] (14)2. TATO Villavicencio, Karolyn Johnson., Sammuel Hatchet., [place], [address] (). Measuring the change indisability after inpatient rehabilitation; comparison of the responsiveness of the Barthel Index and Functional Bazine Measure. Journal of Neurology, Neurosurgery, and Psychiatry, 66(4), 240-622. ANTHONY Irizarry, MAYO Rodriguez, & Wesley Miller MLaneyA. (2004.) Assessment of post-stroke quality of life in cost-effectiveness studies: The usefulness of the Barthel Index and the EuroQoL-5D. Quality of Life Research, 15, 397-19         Physical Therapy Evaluation Charge Determination   History Examination Presentation Decision-Making   MEDIUM  Complexity : 1-2 comorbidities / personal factors will impact the outcome/ POC  HIGH Complexity : 4+ Standardized tests and measures addressing body structure, function, activity limitation and / or participation in recreation  MEDIUM Complexity : Evolving with changing characteristics  Other outcome measures Barthel  MEDIUM      Based on the above components, the patient evaluation is determined to be of the following complexity level: MEDIUM    Pain Ratin/10 R knee    Activity Tolerance:   Fair, SpO2 stable on RA, requires rest breaks and limited by R knee pain   Please refer to the flowsheet for vital signs taken during this treatment.     After treatment patient left in no apparent distress:   Sitting in chair, Call bell within reach and Caregiver / family present    COMMUNICATION/EDUCATION:   The patients plan of care was discussed with: Physical Therapy Assistant, Registered Nurse,  and NP . Fall prevention education was provided and the patient/caregiver indicated understanding., Patient/family have participated as able in goal setting and plan of care. and Patient/family agree to work toward stated goals and plan of care.     Thank you for this referral.  Trudy Tristan, PT   Time Calculation: 30 mins

## 2019-11-13 VITALS
HEART RATE: 80 BPM | DIASTOLIC BLOOD PRESSURE: 49 MMHG | HEIGHT: 63 IN | BODY MASS INDEX: 28.24 KG/M2 | SYSTOLIC BLOOD PRESSURE: 98 MMHG | RESPIRATION RATE: 18 BRPM | WEIGHT: 159.39 LBS | OXYGEN SATURATION: 94 % | TEMPERATURE: 98.3 F

## 2019-11-13 LAB
ANION GAP SERPL CALC-SCNC: 8 MMOL/L (ref 5–15)
BUN SERPL-MCNC: 11 MG/DL (ref 6–20)
BUN/CREAT SERPL: 14 (ref 12–20)
CALCIUM SERPL-MCNC: 8.3 MG/DL (ref 8.5–10.1)
CHLORIDE SERPL-SCNC: 100 MMOL/L (ref 97–108)
CO2 SERPL-SCNC: 25 MMOL/L (ref 21–32)
CREAT SERPL-MCNC: 0.77 MG/DL (ref 0.55–1.02)
GLUCOSE SERPL-MCNC: 102 MG/DL (ref 65–100)
HGB BLD-MCNC: 9 G/DL (ref 11.5–16)
POTASSIUM SERPL-SCNC: 3.3 MMOL/L (ref 3.5–5.1)
SODIUM SERPL-SCNC: 133 MMOL/L (ref 136–145)

## 2019-11-13 PROCEDURE — 36415 COLL VENOUS BLD VENIPUNCTURE: CPT

## 2019-11-13 PROCEDURE — 85018 HEMOGLOBIN: CPT

## 2019-11-13 PROCEDURE — 74011250637 HC RX REV CODE- 250/637: Performed by: ORTHOPAEDIC SURGERY

## 2019-11-13 PROCEDURE — 97116 GAIT TRAINING THERAPY: CPT

## 2019-11-13 PROCEDURE — 94760 N-INVAS EAR/PLS OXIMETRY 1: CPT

## 2019-11-13 PROCEDURE — 97530 THERAPEUTIC ACTIVITIES: CPT

## 2019-11-13 PROCEDURE — 80048 BASIC METABOLIC PNL TOTAL CA: CPT

## 2019-11-13 PROCEDURE — 97110 THERAPEUTIC EXERCISES: CPT

## 2019-11-13 PROCEDURE — 74011250637 HC RX REV CODE- 250/637: Performed by: PHYSICIAN ASSISTANT

## 2019-11-13 RX ORDER — POLYETHYLENE GLYCOL 3350 17 G/17G
17 POWDER, FOR SOLUTION ORAL
Qty: 15 PACKET | Refills: 0 | Status: SHIPPED | OUTPATIENT
Start: 2019-11-13 | End: 2019-11-28

## 2019-11-13 RX ORDER — OXYCODONE HYDROCHLORIDE 5 MG/1
5-10 TABLET ORAL
Qty: 80 TAB | Refills: 0 | Status: SHIPPED | OUTPATIENT
Start: 2019-11-13 | End: 2019-11-27

## 2019-11-13 RX ORDER — ACETAMINOPHEN 325 MG/1
650 TABLET ORAL EVERY 6 HOURS
Qty: 112 TAB | Refills: 0 | Status: SHIPPED
Start: 2019-11-13 | End: 2019-11-27

## 2019-11-13 RX ORDER — AMOXICILLIN 250 MG
1 CAPSULE ORAL DAILY
Qty: 30 TAB | Refills: 0 | Status: SHIPPED | OUTPATIENT
Start: 2019-11-13

## 2019-11-13 RX ADMIN — CELECOXIB 200 MG: 200 CAPSULE ORAL at 08:21

## 2019-11-13 RX ADMIN — ACETAMINOPHEN 650 MG: 325 TABLET ORAL at 00:00

## 2019-11-13 RX ADMIN — OXYCODONE HYDROCHLORIDE 5 MG: 5 TABLET ORAL at 02:45

## 2019-11-13 RX ADMIN — APIXABAN 5 MG: 5 TABLET, FILM COATED ORAL at 08:22

## 2019-11-13 RX ADMIN — OXYCODONE HYDROCHLORIDE 5 MG: 5 TABLET ORAL at 11:40

## 2019-11-13 RX ADMIN — PANTOPRAZOLE SODIUM 40 MG: 40 TABLET, DELAYED RELEASE ORAL at 07:55

## 2019-11-13 RX ADMIN — FLECAINIDE ACETATE 50 MG: 100 TABLET ORAL at 08:22

## 2019-11-13 RX ADMIN — ACETAMINOPHEN 650 MG: 325 TABLET ORAL at 11:40

## 2019-11-13 RX ADMIN — SERTRALINE HYDROCHLORIDE 50 MG: 50 TABLET ORAL at 08:21

## 2019-11-13 RX ADMIN — OXYCODONE HYDROCHLORIDE 5 MG: 5 TABLET ORAL at 07:55

## 2019-11-13 RX ADMIN — ONDANSETRON 4 MG: 4 TABLET, ORALLY DISINTEGRATING ORAL at 08:19

## 2019-11-13 RX ADMIN — DOCUSATE SODIUM - SENNOSIDES 1 TABLET: 50; 8.6 TABLET, FILM COATED ORAL at 08:21

## 2019-11-13 RX ADMIN — Medication 10 ML: at 06:00

## 2019-11-13 RX ADMIN — PREDNISOLONE ACETATE 1 DROP: 10 SUSPENSION/ DROPS OPHTHALMIC at 09:00

## 2019-11-13 RX ADMIN — POLYETHYLENE GLYCOL (3350) 17 G: 17 POWDER, FOR SOLUTION ORAL at 08:21

## 2019-11-13 NOTE — PROGRESS NOTES
Ortho / Neurosurgery NP Note    POD# 2  s/p RE-ENTRY RIGHT TOTAL KNEE ARTHROPLASTY WITH NAVIGATION (GEN WITH BLOCK) (LATEX ALLERGY) (NICKEL ALLERGY)   Pt seen with  at bedside    Pt resting in chair. Pain well controlled today    PONV improved    VSS Afebrile. Except BP low 98/49 - stable and asymptomatic     Voiding status: + void    Output (mL)  Urine Voided: 750 ml (11/13/19 0626)  Emesis: 200 mL (11/12/19 0818)  Last Bowel Movement Date: 11/10/19 (11/13/19 0900)  Unmeasurable Output  Urine Occurrence(s): 1(voided in bathroom  ) (11/11/19 0750)  Stool Occurrence(s): 1(bm in bathroom  ) (11/11/19 0715)  Straight Cath  Straight Cath: Sterile technique used;Nurse performed cath (11/12/19 1842)  Number of Attempts: 1 (11/12/19 1842)  Catheter Size: 14 FR (11/12/19 1842)  Time Catheter Inserted: 0431 (11/12/19 1842)  Time Catheter Removed: 1938 (11/12/19 1842)  Urine: 475 mL (11/12/19 1842)      Labs  Lab Results   Component Value Date/Time    HGB 9.0 (L) 11/13/2019 02:49 AM      Lab Results   Component Value Date/Time    INR 1.0 11/04/2019 11:01 AM        Recent Glucose Results:   Lab Results   Component Value Date/Time     (H) 11/13/2019 02:49 AM         Body mass index is 28.24 kg/m². : A BMI > 30 is classified as obesity and > 40 is classified as morbid obesity. Prineo Dressing c.d.i  Cryotherapy in place over incision  Calves soft and supple; No pain with passive stretch  Sensation and motor intact  SCDs for mechanical DVT proph while in bed     PLAN:  1) PT BID; revision in same day due to nickel allergy.   TTWB  2) Eliquis for DVT Prophylaxis   3) GI Prophylaxis - Protonix  4) Readniess for discharge:     [x] Vital Signs stable    [x] Hgb stable    [x] + Voiding -   [x] Wound intact, drainage minimal    [x] Tolerating PO intake    [x] Cleared by PT (OT if applicable)     [x] Stair training completed (if applicable)    [x] Independent / Contact Guard Assist (household distance)     [x] Bed mobility     [x] Car transfers     [x] ADLs    [x] Adequate pain control on oral medication alone     Plan home today -  Escribed meds to [address], NP  DNP, ACNP-BC, ONP-C

## 2019-11-13 NOTE — PROGRESS NOTES
Reviewed discharge instructions with patient, all questions answered. IV access removed earlier. Discharge instructions with patient. Patient's  [facility]. Patient belongings packed and waiting for volunteer with wheelchair for discharge home.     Earney Krabbe, RN

## 2019-11-13 NOTE — DISCHARGE SUMMARY
Ortho Discharge Summary    Patient ID:  Vidhi Ragsdale  [id number]  female  75 y.o.  1955    Admit date: 11/11/2019    Discharge date: 11/13/2019    Admitting Physician: Silviano Maya MD     Consulting Physician(s):   Treatment Team: Attending Provider: Ashanti Mendes MD; Utilization Review: Lukasz Gill RN; Care Manager: Malinda Farrell    Date of Surgery:   11/11/2019     Preoperative Diagnosis:  RIGHT KNEE OSTEOARTHRITIS    Postoperative Diagnosis:   RIGHT KNEE OSTEOARTHRITIS    Procedure(s):     RE-ENTRY RIGHT TOTAL KNEE ARTHROPLASTY WITH NAVIGATION (GEN WITH BLOCK) (LATEX ALLERGY) (NICKEL ALLERGY)     Anesthesia Type:   General     Surgeon: Ashanti Mendes MD                            HPI:  Pt is a 75 y.o. female who has a history of RIGHT KNEE OSTEOARTHRITIS  with pain and limitations of activities of daily living who presents at this time for a right TKA following the failure of conservative management. Re-entry TKA to revise nickel containing component. PMH:   Past Medical History:   Diagnosis Date    Coagulation disorder ([address]     takes Eliquis    Depression     GERD (gastroesophageal reflux disease)     H/O hammer toe correction     Nausea & vomiting     Osteoarthritis     knee, thumb    Osteoporosis     Psychiatric disorder     anxiety, seasonal affective disorder    PUD (peptic ulcer disease) 2015    required blood transfusion    SVT (supraventricular tachycardia) (HCC)     Afib takes Eliquis       Body mass index is 28.24 kg/m². : A BMI > 30 is classified as obesity and > 40 is classified as morbid obesity. Medications upon admission :   Prior to Admission Medications   Prescriptions Last Dose Informant Patient Reported? Taking? Calcium-Cholecalciferol, D3, (CALCIUM 600 WITH VITAMIN D3) 600 mg(1,500mg) -400 unit cap 11/5/2019  Yes No   Sig: Take 2 Caps by mouth daily.    alendronate (FOSAMAX) 70 mg tablet 11/10/2019 at 0700  Yes No   Sig: Take  by mouth every seven (7) days. apixaban (ELIQUIS PO) 11/4/2019  Yes No   Sig: Take 5 mg by mouth two (2) times a day. conjugated estrogens (PREMARIN) 0.625 mg/gram vaginal cream 11/10/2019 at 0800  Yes No   Sig: Insert 0.5 g into vagina daily. fish oil-omega-3 fatty acids 340-1,000 mg capsule 11/5/2019  Yes No   Sig: Take 1 Cap by mouth daily. flecainide (TAMBOCOR) 50 mg tablet 11/11/2019 at 0500  Yes Yes   Sig: Take 50 mg by mouth two (2) times a day. folic acid/multivit-min/lutein (SPECTRAVITE ADULT 50 PLUS,LUT, PO) 11/5/2019  Yes No   Sig: Take 1 Tab by mouth daily. pantoprazole (PROTONIX) 40 mg tablet 11/11/2019 at 0500  Yes Yes   Sig: Take 40 mg by mouth daily. prednisoLONE acetate (PRED FORTE) 1 % ophthalmic suspension 11/11/2019 at 0500  Yes Yes   Sig: Administer 1 Drop to both eyes four (4) times daily. sertraline (ZOLOFT) 50 mg tablet 11/11/2019 at 0500  Yes Yes   Sig: Take 50 mg by mouth two (2) times a day. zolpidem (AMBIEN) 5 mg tablet 11/2/2019  Yes No   Sig: Take 5 mg by mouth nightly as needed for Sleep. 1/2 tab at night as needed      Facility-Administered Medications: None        Allergies: Allergies   Allergen Reactions    Latex Rash    Prolia [Denosumab] Other (comments)     \" major body reaction     Adhesive Tape-Silicones Rash    Erythromycin Rash    Neomycin Rash    Nickel Rash    Nsaids (Non-Steroidal Anti-Inflammatory Drug) Other (comments)     Bleeding ulcers         Hospital Course: The patient underwent surgery. Complications:  None; patient tolerated the procedure well. Was taken to the PACU in stable condition and then transferred to the ortho floor.       Perioperative Antibiotics:  Ancef     Postoperative Pain Management:  Oxycodone      DVT Prophylaxis: Eliquis    Postoperative transfusions:    Number of units banked PRBCs =   none     Post Op complications: none    Hemoglobin at discharge:    Lab Results   Component Value Date/Time    HGB 9.0 (L) 11/13/2019 02:49 AM    INR 1.0 11/04/2019 11:01 AM       Dressing was changed on POD # 1. Incision - clean, dry and intact. No significant erythema ; moderate, expected swelling. Neurovascular exam found to be within normal limits. Wound appears to be healing without any evidence of infection. Physical Therapy started following surgery and participated in bed mobility, transfers and ambulation. Maintaining TTWB       Gait:  Gait  Base of Support: Narrowed, Center of gravity altered  Speed/Marilee: Slow  Step Length: Left shortened  Gait Abnormalities: Antalgic, Decreased step clearance, Step to gait  Ambulation - Level of Assistance: Contact guard assistance, Assist x1  Distance (ft): 25 Feet (ft)  Assistive Device: Walker, rolling, Gait belt, Crutches  Rail Use: Left   Stairs - Level of Assistance: Minimum assistance, Assist X1, Additional time  Number of Stairs Trained: 4                 Discharged to: Home. Condition on Discharge:   stable    Discharge instructions:  - Anticoagulate with Eliquis  - Take pain medications as prescribed  - Resume pre hospital diet      - Discharge activity: activity as tolerated  - Ambulate with assistive device as needed. - Weight bearing status WBAT  - Wound Care Keep wound clean and dry. See discharge instruction sheet. -DISCHARGE MEDICATION LIST     Current Discharge Medication List      START taking these medications    Details   acetaminophen (TYLENOL) 325 mg tablet Take 2 Tabs by mouth every six (6) hours for 14 days. Qty: 112 Tab, Refills: 0      oxyCODONE IR (ROXICODONE) 5 mg immediate release tablet Take 1-2 Tabs by mouth every four (4) hours as needed for Pain for up to 14 days. Max Daily Amount: 60 mg.  Qty: 80 Tab, Refills: 0    Associated Diagnoses: Status post total right knee replacement      polyethylene glycol (MIRALAX) 17 gram packet Take 1 Packet by mouth daily as needed (constipation) for up to 15 days.   Qty: 15 Packet, Refills: 0      senna-docusate (PERICOLACE) 8.6-50 mg per tablet Take 1 Tab by mouth daily. Qty: 30 Tab, Refills: 0         CONTINUE these medications which have NOT CHANGED    Details   sertraline (ZOLOFT) 50 mg tablet Take 50 mg by mouth two (2) times a day. prednisoLONE acetate (PRED FORTE) 1 % ophthalmic suspension Administer 1 Drop to both eyes four (4) times daily. pantoprazole (PROTONIX) 40 mg tablet Take 40 mg by mouth daily. flecainide (TAMBOCOR) 50 mg tablet Take 50 mg by mouth two (2) times a day. folic acid/multivit-min/lutein (SPECTRAVITE ADULT 50 PLUS,LUT, PO) Take 1 Tab by mouth daily. alendronate (FOSAMAX) 70 mg tablet Take  by mouth every seven (7) days. apixaban (ELIQUIS PO) Take 5 mg by mouth two (2) times a day. fish oil-omega-3 fatty acids 340-1,000 mg capsule Take 1 Cap by mouth daily. conjugated estrogens (PREMARIN) 0.625 mg/gram vaginal cream Insert 0.5 g into vagina daily. Calcium-Cholecalciferol, D3, (CALCIUM 600 WITH VITAMIN D3) 600 mg(1,500mg) -400 unit cap Take 2 Caps by mouth daily. STOP taking these medications       zolpidem (AMBIEN) 5 mg tablet Comments:   Reason for Stopping:            per medical continuation form      -Follow up in office in 2 weeks      Signed:  May Crowder.  Azul Mistry, PAIGEP-BC, ONP-C  Orthopaedic Nurse Practitioner    11/13/2019  10:46 AM

## 2019-11-13 NOTE — PROGRESS NOTES
Problem: Mobility Impaired (Adult and Pediatric)  Goal: *Acute Goals and Plan of Care (Insert Text)  Description  FUNCTIONAL STATUS PRIOR TO ADMISSION: Patient was modified independent using a walker and lofstrand crutches for functional mobility. HOME SUPPORT PRIOR TO ADMISSION: The patient lived with spouse but did not require assist. Lives in 2 story home with 7 steps to enter with 1 rail on L. 13 steps to upstairs bedroom and walk-in shower. Spouse will assist as needed post hospitalization. Physical Therapy Goals  Initiated 11/12/2019    1. Patient will move from supine to sit and sit to supine , scoot up and down and roll side to side in bed with independence within 4 days. 2. Patient will perform sit to stand with modified independence within 4 days. 3. Patient will ambulate with modified independence for 125 feet with the least restrictive device within 4 days. 4. Patient will ascend/descend 7 stairs with 1 handrail(s) and 1 crutch with minimal assistance/contact guard assist within 4 days. 5. Patient will perform home exercise program per protocol with supervision/set-up within 4 days. 6. Patient will demonstrate AROM 0-90 degrees in operative joint within 4 days. Note:   PHYSICAL THERAPY TREATMENT  Patient: Kristina Bowers (40 y.o. female)  Date: 11/13/2019  Diagnosis: Status post total knee replacement [Z96.659]     Procedure(s) (LRB):  RE-ENTRY RIGHT TOTAL KNEE ARTHROPLASTY WITH NAVIGATION (GEN WITH BLOCK) (LATEX ALLERGY) (NICKEL ALLERGY)   (Right) 2 Days Post-Op    Precautions:  Fall, (RLE TTWB)  Chart, physical therapy assessment, plan of care and goals were reviewed. ASSESSMENT  Based on the objective data described below, pt moving very well today with good improvement with all mobility, had  stair train to help get pt into house, no LOB or SOB, did well with toilet transfer and bed mob, does well with ther-ex, vc's for safety and proper RW use.      Current Level of Function Impacting Discharge (mobility/balance):        PLAN :  Patient continues to benefit from skilled intervention to address the above impairments. Continue treatment per established plan of care. to address goals. Recommendation for discharge: (in order for the patient to meet his/her long term goals)  Physical therapy at least 2 days/week in the home     This discharge recommendation:  Has been made in collaboration with the attending provider and/or case management    IF patient discharges home will need the following DME: has rolling walker     OBJECTIVE DATA SUMMARY:     Critical Behavior:  Neurologic State: Alert  Orientation Level: Oriented X4  Cognition: Follows commands, Appropriate decision making, Appropriate for age attention/concentration, Appropriate safety awareness     Functional Mobility Training:  Bed Mobility:  Rolling: Supervision  Supine to Sit: Supervision  Scooting: Supervision  Level of Assistance: Supervision  Interventions: Verbal cues    Transfers:  Sit to Stand: Stand-by assistance  Stand to Sit: Stand-by assistance  Bed to Chair: Contact guard assistance; Additional time  Interventions: Tactile cues; Verbal cues  Level of Assistance: Contact guard assistance    Balance:  Sitting: Intact; Without support  Standing: Intact; With support  Standing - Static: Good;Constant support  Standing - Dynamic : Good;Constant support    Ambulation/Gait Training:  Distance (ft): 25 Feet (ft)  Assistive Device: Walker, rolling;Gait belt  Ambulation - Level of Assistance: Contact guard assistance;Assist x1  Gait Abnormalities: Antalgic;Decreased step clearance; Step to gait  Right Side Weight Bearing: Toe touch  Base of Support: Narrowed; Center of gravity altered  Speed/Marilee: Slow  Step Length: Left shortened    Stairs:  Number of Stairs Trained: 8  Stairs - Level of Assistance: Minimum assistance;Assist X1;Additional time   Rail Use: Left     Therapeutic Exercises:  sitting  EXERCISE   Sets Reps   Active Active Assist   Passive   Comments   Ankle pumps 1 10 ? ? ? bilat   Heel raises 1 10 ? ? ? \"   Toe tap 1 10 ? ? ? \"   Knee ext 1 10 ? ? ? \"   Hip flex 1 10 ? ? ? \"     Pain Rating: see flow sheet    Activity Tolerance: Fair    After treatment patient left in no apparent distress: Sitting in chair, Call bell within reach and Caregiver / family present    COMMUNICATION/COLLABORATION:   The patients plan of care was discussed with: Registered Nurse    Eros Prince PTA   Time Calculation: 35 mins

## 2019-11-14 ENCOUNTER — HOME CARE VISIT (OUTPATIENT)
Dept: SCHEDULING | Facility: HOME HEALTH | Age: 64
End: 2019-11-14
Payer: MEDICARE

## 2019-11-14 VITALS
HEART RATE: 67 BPM | HEIGHT: 63 IN | OXYGEN SATURATION: 99 % | DIASTOLIC BLOOD PRESSURE: 66 MMHG | WEIGHT: 159.39 LBS | TEMPERATURE: 98.3 F | SYSTOLIC BLOOD PRESSURE: 100 MMHG | RESPIRATION RATE: 19 BRPM | BODY MASS INDEX: 28.24 KG/M2

## 2019-11-14 PROCEDURE — 400013 HH SOC

## 2019-11-14 PROCEDURE — G0151 HHCP-SERV OF PT,EA 15 MIN: HCPCS

## 2019-11-14 PROCEDURE — 3331090002 HH PPS REVENUE DEBIT

## 2019-11-14 PROCEDURE — 3331090001 HH PPS REVENUE CREDIT

## 2019-11-15 PROCEDURE — 3331090001 HH PPS REVENUE CREDIT

## 2019-11-15 PROCEDURE — 3331090002 HH PPS REVENUE DEBIT

## 2019-11-16 PROCEDURE — 3331090002 HH PPS REVENUE DEBIT

## 2019-11-16 PROCEDURE — 3331090001 HH PPS REVENUE CREDIT

## 2019-11-17 PROCEDURE — 3331090002 HH PPS REVENUE DEBIT

## 2019-11-17 PROCEDURE — 3331090001 HH PPS REVENUE CREDIT

## 2019-11-18 ENCOUNTER — HOME CARE VISIT (OUTPATIENT)
Dept: SCHEDULING | Facility: HOME HEALTH | Age: 64
End: 2019-11-18
Payer: MEDICARE

## 2019-11-18 PROCEDURE — 3331090002 HH PPS REVENUE DEBIT

## 2019-11-18 PROCEDURE — G0151 HHCP-SERV OF PT,EA 15 MIN: HCPCS

## 2019-11-18 PROCEDURE — 3331090001 HH PPS REVENUE CREDIT

## 2019-11-19 PROCEDURE — 3331090002 HH PPS REVENUE DEBIT

## 2019-11-19 PROCEDURE — 3331090001 HH PPS REVENUE CREDIT

## 2019-11-20 PROCEDURE — 3331090001 HH PPS REVENUE CREDIT

## 2019-11-20 PROCEDURE — 3331090002 HH PPS REVENUE DEBIT

## 2019-11-21 ENCOUNTER — HOME CARE VISIT (OUTPATIENT)
Dept: SCHEDULING | Facility: HOME HEALTH | Age: 64
End: 2019-11-21
Payer: MEDICARE

## 2019-11-21 VITALS
OXYGEN SATURATION: 98 % | RESPIRATION RATE: 18 BRPM | HEART RATE: 68 BPM | TEMPERATURE: 98.4 F | SYSTOLIC BLOOD PRESSURE: 120 MMHG | DIASTOLIC BLOOD PRESSURE: 60 MMHG

## 2019-11-21 PROCEDURE — G0151 HHCP-SERV OF PT,EA 15 MIN: HCPCS

## 2019-11-21 PROCEDURE — 3331090001 HH PPS REVENUE CREDIT

## 2019-11-21 PROCEDURE — 3331090002 HH PPS REVENUE DEBIT

## 2019-11-22 PROCEDURE — 3331090001 HH PPS REVENUE CREDIT

## 2019-11-22 PROCEDURE — 3331090002 HH PPS REVENUE DEBIT

## 2019-11-22 NOTE — OP NOTES
[address]  OPERATIVE REPORT    Name:  Lee Christianson  MR#:  [id number]  :  1955  ACCOUNT #:  [de-identified]  DATE OF SERVICE:  2019    PREOPERATIVE DIAGNOSIS:  Right total knee replacement with nickel allergy. POSTOPERATIVE DIAGNOSIS:  Right total knee replacement with nickel allergy. PROCEDURE PERFORMED:  Revision of right total knee tibial component with titanium component. SURGEON:  Carmenza Chong MD    ASSISTANT:  IRENE Mitchell    ANESTHESIA:  General.    COMPLICATIONS:  None. SPECIMENS REMOVED:  None. IMPLANTS:  BioMet stemmed titanium tibial tray. ESTIMATED BLOOD LOSS:  Minimal.    DRAINS:  None. CONDITION:  Stable to PACU. INDICATIONS:  This is a 55-year-old female who had undergone successful right total knee replacement earlier that morning. Unfortunately, postoperatively, it was realized that she had been given a tibial tray, which was cobalt-chrome rather than titanium. We discussed treatment options and she would like to proceed with revision of the tibial component given a history of nickel allergy. DESCRIPTION OF PROCEDURE:  After being identified in the preoperative holding area and having her operative site marked, the patient was brought back to the operating room and placed supine on standard OR table. General anesthesia was induced without difficulty. A thigh tourniquet was applied. The right lower extremity was prepped and draped in usual fashion using sterile technique. Appropriate time-out was performed. The limb was exsanguinated and the tourniquet inflated. She was already given scheduled antibiotics. We opened her previous incision removing the sutures. Her medial parapatellar approach was opened. We removed the locking pin from the tibial tray. The polyethylene insert was then removed. We then removed the tibial tray with a punch.   Unfortunately, she had a vertical split in the tibia on removal.  We elected to use a stemmed tibial component made of titanium. We cleaned up the cement from the tibia and then prepared for the tibial implant. The tibia was held in a reduced position while we cemented the tibial component in place. Excess cement was removed with a curette. We placed one 3.5-mm screw anterior and one posterior to the tibial component to secure the fracture. We then placed a 12-mm polyethylene insert and reducing it. She was taken through range of motion and found to be stable in all planes. We thoroughly irrigated with Betadine irrigation. Routine closure in layers was preformed. Sterile compressive dressings were applied. The patient was awakened, moved to the stretcher, and taken to the recovery room in satisfactory condition. At the conclusion of the procedure, all counts were correct. There were no immediate complications.         Olivia Stone MD      [id number]  D:  11/21/2019 11:57  T:  11/21/2019 20:57  JOB #:  [id number]

## 2019-11-23 PROCEDURE — 3331090002 HH PPS REVENUE DEBIT

## 2019-11-23 PROCEDURE — 3331090001 HH PPS REVENUE CREDIT

## 2019-11-24 PROCEDURE — 3331090002 HH PPS REVENUE DEBIT

## 2019-11-24 PROCEDURE — 3331090001 HH PPS REVENUE CREDIT

## 2019-11-25 PROCEDURE — 3331090001 HH PPS REVENUE CREDIT

## 2019-11-25 PROCEDURE — 3331090002 HH PPS REVENUE DEBIT

## 2019-11-26 ENCOUNTER — HOME CARE VISIT (OUTPATIENT)
Dept: SCHEDULING | Facility: HOME HEALTH | Age: 64
End: 2019-11-26
Payer: MEDICARE

## 2019-11-26 VITALS
HEART RATE: 68 BPM | RESPIRATION RATE: 18 BRPM | TEMPERATURE: 97.5 F | DIASTOLIC BLOOD PRESSURE: 60 MMHG | OXYGEN SATURATION: 98 % | SYSTOLIC BLOOD PRESSURE: 120 MMHG

## 2019-11-26 PROCEDURE — G0151 HHCP-SERV OF PT,EA 15 MIN: HCPCS

## 2019-11-26 PROCEDURE — 3331090003 HH PPS REVENUE ADJ

## 2019-11-26 PROCEDURE — 3331090001 HH PPS REVENUE CREDIT

## 2019-11-26 PROCEDURE — 3331090002 HH PPS REVENUE DEBIT

## 2020-10-08 NOTE — PROGRESS NOTES
----- Message from Tabitha Cole MD sent at 10/8/2020  9:39 AM CDT -----  Please add iron and ferritin as ordered    Urinalysis is negative for infection, no protein or blood  Blood cell count showed slight anemia– checking iron level, and will monitor, any signs of blood in stool?  Black stool?  Any abdominal pain?  Kidneys, liver, thyroid, B12 and folate levels are normal  Cholesterol is well controlled  Diabetes is well controlled–A1c 7.3  Continue the same medications with no changes, keep appointment as scheduled   Bedside shift change report given to Apolonia Lynch RN  (oncoming nurse) by Rajani De Oliveira RN  (offgoing nurse). Report included the following information Procedure Summary, MAR and Recent Results.

## 2020-10-27 ENCOUNTER — HOSPITAL ENCOUNTER (OUTPATIENT)
Dept: MAMMOGRAPHY | Age: 65
Discharge: HOME | End: 2020-10-27
Attending: INTERNAL MEDICINE
Payer: MEDICARE

## 2020-10-27 DIAGNOSIS — Z12.31 ENCOUNTER FOR SCREENING MAMMOGRAM FOR MALIGNANT NEOPLASM OF BREAST: ICD-10-CM

## 2020-10-27 PROCEDURE — 77063 BREAST TOMOSYNTHESIS BI: CPT

## 2020-12-18 ENCOUNTER — TRANSCRIBE ORDER (OUTPATIENT)
Dept: SCHEDULING | Age: 65
End: 2020-12-18

## 2020-12-18 DIAGNOSIS — M85.88 OSTEOPENIA OF OTHER SITE: Primary | ICD-10-CM

## 2020-12-29 ENCOUNTER — HOSPITAL ENCOUNTER (OUTPATIENT)
Dept: MAMMOGRAPHY | Age: 65
Discharge: HOME | End: 2020-12-29
Attending: INTERNAL MEDICINE
Payer: MEDICARE

## 2020-12-29 DIAGNOSIS — M85.88 OSTEOPENIA OF OTHER SITE: ICD-10-CM

## 2020-12-29 PROCEDURE — 77080 DXA BONE DENSITY AXIAL: CPT

## 2021-09-10 ENCOUNTER — TRANSCRIBE ORDER (OUTPATIENT)
Dept: SCHEDULING | Age: 66
End: 2021-09-10

## 2021-09-10 DIAGNOSIS — Z12.31 VISIT FOR SCREENING MAMMOGRAM: Primary | ICD-10-CM

## 2021-10-28 ENCOUNTER — HOSPITAL ENCOUNTER (OUTPATIENT)
Dept: MAMMOGRAPHY | Age: 66
Discharge: HOME | End: 2021-10-28
Attending: INTERNAL MEDICINE
Payer: MEDICARE

## 2021-10-28 DIAGNOSIS — Z12.31 VISIT FOR SCREENING MAMMOGRAM: ICD-10-CM

## 2021-10-28 PROCEDURE — 77063 BREAST TOMOSYNTHESIS BI: CPT

## 2021-10-29 ENCOUNTER — TRANSCRIBE ORDER (OUTPATIENT)
Dept: SCHEDULING | Age: 66
End: 2021-10-29

## 2021-10-29 DIAGNOSIS — R92.8 ABNORMAL MAMMOGRAM: Primary | ICD-10-CM

## 2021-11-09 ENCOUNTER — HOSPITAL ENCOUNTER (OUTPATIENT)
Dept: ULTRASOUND IMAGING | Age: 66
Discharge: HOME | End: 2021-11-09
Attending: INTERNAL MEDICINE
Payer: MEDICARE

## 2021-11-09 ENCOUNTER — HOSPITAL ENCOUNTER (OUTPATIENT)
Dept: MAMMOGRAPHY | Age: 66
Discharge: HOME | End: 2021-11-09
Attending: INTERNAL MEDICINE
Payer: MEDICARE

## 2021-11-09 DIAGNOSIS — R92.8 ABNORMAL MAMMOGRAM: ICD-10-CM

## 2021-11-09 PROCEDURE — 77061 BREAST TOMOSYNTHESIS UNI: CPT

## 2021-12-13 ENCOUNTER — OFFICE VISIT (OUTPATIENT)
Dept: NEUROLOGY | Age: 66
End: 2021-12-13
Payer: MEDICARE

## 2021-12-13 VITALS
BODY MASS INDEX: 30.22 KG/M2 | WEIGHT: 164.2 LBS | HEIGHT: 62 IN | HEART RATE: 59 BPM | SYSTOLIC BLOOD PRESSURE: 130 MMHG | DIASTOLIC BLOOD PRESSURE: 86 MMHG | RESPIRATION RATE: 16 BRPM | TEMPERATURE: 98.3 F

## 2021-12-13 DIAGNOSIS — G25.0 TREMOR, ESSENTIAL: Primary | ICD-10-CM

## 2021-12-13 PROCEDURE — G9899 SCRN MAM PERF RSLTS DOC: HCPCS | Performed by: PSYCHIATRY & NEUROLOGY

## 2021-12-13 PROCEDURE — G8427 DOCREV CUR MEDS BY ELIG CLIN: HCPCS | Performed by: PSYCHIATRY & NEUROLOGY

## 2021-12-13 PROCEDURE — 1101F PT FALLS ASSESS-DOCD LE1/YR: CPT | Performed by: PSYCHIATRY & NEUROLOGY

## 2021-12-13 PROCEDURE — G8399 PT W/DXA RESULTS DOCUMENT: HCPCS | Performed by: PSYCHIATRY & NEUROLOGY

## 2021-12-13 PROCEDURE — G8536 NO DOC ELDER MAL SCRN: HCPCS | Performed by: PSYCHIATRY & NEUROLOGY

## 2021-12-13 PROCEDURE — 1090F PRES/ABSN URINE INCON ASSESS: CPT | Performed by: PSYCHIATRY & NEUROLOGY

## 2021-12-13 PROCEDURE — G8417 CALC BMI ABV UP PARAM F/U: HCPCS | Performed by: PSYCHIATRY & NEUROLOGY

## 2021-12-13 PROCEDURE — 99205 OFFICE O/P NEW HI 60 MIN: CPT | Performed by: PSYCHIATRY & NEUROLOGY

## 2021-12-13 PROCEDURE — 3017F COLORECTAL CA SCREEN DOC REV: CPT | Performed by: PSYCHIATRY & NEUROLOGY

## 2021-12-13 PROCEDURE — G8510 SCR DEP NEG, NO PLAN REQD: HCPCS | Performed by: PSYCHIATRY & NEUROLOGY

## 2021-12-13 RX ORDER — ZOLPIDEM TARTRATE 5 MG/1
TABLET ORAL
COMMUNITY

## 2021-12-13 RX ORDER — PRIMIDONE 50 MG/1
50 TABLET ORAL
Qty: 30 TABLET | Refills: 3 | Status: SHIPPED | OUTPATIENT
Start: 2021-12-13 | End: 2022-03-07

## 2021-12-13 RX ORDER — ALPRAZOLAM 0.5 MG/1
TABLET ORAL
COMMUNITY
Start: 2021-11-11

## 2021-12-13 NOTE — PROGRESS NOTES
Tremors for more than 6yr  Father has tremors  Neurology Consult Note      HISTORY PROVIDED BY: patient    Chief Complaint:   Chief Complaint   Patient presents with    New Patient     mostly left hand    Tremors      Subjective:    Dontrlel Naylor is a 74 y.o. left handed female who presents in consultation for tremor  This is a 78-year-old left-handed white female with history of depression, GERD, degenerative joint disease, osteoporosis, anxiety, SAD, PUD, A. fib on Eliquis, who was referred to the clinic to evaluate for tremors. Patient says the tremor has been going on for many years, however, lately it is becoming more noticeable. She says the tremor is mostly when she tries to do something or if she is anxious. Tremor is on the left hand. She said that being left-handed, and it is the left hand is affected it makes it difficult at times to carry out her activities of daily living. She says it is because of her family that she decided to see a specialist for the tremor. On further questioning, patient says her father had tremor in this age. She denies loss of consciousness, dysphagia or odynophagia. She denies any change in smell, frequent fall,.    Review of Systems - General ROS: positive for  - fatigue and sleep disturbance  Psychological ROS: positive for - anxiety, depression and sleep disturbances  Ophthalmic ROS: positive for - decreased vision  ENT ROS: positive for - tinnitus and visual changes  Allergy and Immunology ROS: negative  Hematological and Lymphatic ROS: negative  Endocrine ROS: negative  Respiratory ROS: no cough, shortness of breath, or wheezing  Cardiovascular ROS: no chest pain or dyspnea on exertion  Gastrointestinal ROS: no abdominal pain, change in bowel habits, or black or bloody stools  Genito-Urinary ROS: no dysuria, trouble voiding, or hematuria  Musculoskeletal ROS: positive for - muscular weakness  Neurological ROS: positive for - tremors and visual changes  Dermatological ROS: negative  Past Medical History:   Diagnosis Date    Coagulation disorder ([address]     takes Eliquis    Depression     GERD (gastroesophageal reflux disease)     H/O hammer toe correction     Nausea & vomiting     Osteoarthritis     knee, thumb    Osteoporosis     Psychiatric disorder     anxiety, seasonal affective disorder    PUD (peptic ulcer disease) 2015    required blood transfusion    SVT (supraventricular tachycardia) (HCC)     Afib takes Eliquis      Past Surgical History:   Procedure Laterality Date    HX BREAST BIOPSY Right     pt was 21or 19years old negative    HX CATARACT REMOVAL      bilateral    HX GYN      HX HEENT  1979    L pupil does not react; h/o bilateral retina surgery/detachment    HX HYSTERECTOMY      HX KNEE REPLACEMENT Left     HX ORTHOPAEDIC Bilateral     left thumb surgery due to OA    HX ORTHOPAEDIC Right     right hammer toe repair with screw    HX RETINAL DETACHMENT REPAIR      bilateral     NC CARDIAC SURG PROCEDURE UNLIST  2017    Cardiac Ablation    NC DILATE ESOPHAGUS  6/27/2018         NC EGD TRANSORAL CONTROL BLEEDING ANY METHOD  10/2/2013         UPPER GI ENDOSCOPY,BIOPSY  6/27/2018           Social History     Socioeconomic History    Marital status:      Spouse name: Not on file    Number of children: Not on file    Years of education: Not on file    Highest education level: Not on file   Occupational History    Not on file   Tobacco Use    Smoking status: Never Smoker    Smokeless tobacco: Never Used   Substance and Sexual Activity    Alcohol use:  Yes     Alcohol/week: 5.0 standard drinks     Types: 5 Glasses of wine per week     Comment: 5 per week    Drug use: Yes     Types: Prescription, OTC    Sexual activity: Yes     Partners: Male     Birth control/protection: Surgical   Other Topics Concern    Not on file   Social History Narrative    Not on file     Social Determinants of Health     Financial Resource Strain:     Difficulty of Paying Living Expenses: Not on file   Food Insecurity:     Worried About Running Out of Food in the Last Year: Not on file    Patrick of Food in the Last Year: Not on file   Transportation Needs:     Lack of Transportation (Medical): Not on file    Lack of Transportation (Non-Medical): Not on file   Physical Activity:     Days of Exercise per Week: Not on file    Minutes of Exercise per Session: Not on file   Stress:     Feeling of Stress : Not on file   Social Connections:     Frequency of Communication with Friends and Family: Not on file    Frequency of Social Gatherings with Friends and Family: Not on file    Attends [employer] Services: Not on file    Active Member of [address] [place] [address] or Organizations: Not on file    Attends Club or Organization Meetings: Not on file    Marital Status: Not on file   Intimate Partner Violence:     Fear of Current or Ex-Partner: Not on file    Emotionally Abused: Not on file    Physically Abused: Not on file    Sexually Abused: Not on file   Housing Stability:     Unable to Pay for Housing in the Last Year: Not on file    Number of [place] in the Last Year: Not on file    Unstable Housing in the Last Year: Not on file     Family History   Problem Relation Age of Onset    Heart Disease Mother     Kidney Disease Mother     Heart Disease Father     Hypertension Father          Objective:   ROS  As per HPI    Allergies   Allergen Reactions    Latex Rash    Prolia [Denosumab] Other (comments)     \" major body reaction     Adhesive Tape-Silicones Rash    Erythromycin Rash    Neomycin Rash    Nickel Rash    Nsaids (Non-Steroidal Anti-Inflammatory Drug) Other (comments)     Bleeding ulcers         Meds:  Outpatient Medications Prior to Visit   Medication Sig Dispense Refill    folic acid/multivit-min/lutein (SPECTRAVITE ADULT 50 PLUS,LUT, PO) Take 1 Tab by mouth daily.       sertraline (ZOLOFT) 50 mg tablet Take 50 mg by mouth two (2) times a day.  alendronate (FOSAMAX) 70 mg tablet Take  by mouth every seven (7) days.  apixaban (ELIQUIS PO) Take 5 mg by mouth two (2) times a day.  fish oil-omega-3 fatty acids 340-1,000 mg capsule Take 1 Cap by mouth daily.  pantoprazole (PROTONIX) 40 mg tablet Take 40 mg by mouth daily.  flecainide (TAMBOCOR) 50 mg tablet Take 50 mg by mouth two (2) times a day.  conjugated estrogens (PREMARIN) 0.625 mg/gram vaginal cream Insert 0.5 g into vagina daily.  Calcium-Cholecalciferol, D3, (CALCIUM 600 WITH VITAMIN D3) 600 mg(1,500mg) -400 unit cap Take 2 Caps by mouth daily.  ALPRAZolam (XANAX) 0.5 mg tablet       zolpidem (AMBIEN) 5 mg tablet 1 tablet at bedtime      senna-docusate (PERICOLACE) 8.6-50 mg per tablet Take 1 Tab by mouth daily. 30 Tab 0    prednisoLONE acetate (PRED FORTE) 1 % ophthalmic suspension Administer 1 Drop to both eyes four (4) times daily. No facility-administered medications prior to visit. Imaging:  MRI Results (most recent):  Results from [place] encounter on 10/10/19    MRI KNEE RT WO CONT    Narrative  EXAM:  MRI KNEE RT WO CONT    INDICATION: Right knee osteoarthritis    COMPARISON: None    TECHNIQUE: Axial T2 fat-saturated and proton density fat-saturated; coronal T1  and proton density fat-saturated; and sagittal T2 fat-saturated, proton density  fat-saturated, and gradient echo MRI of the right knee . CONTRAST:  None. FINDINGS:    ACL and PCL: Intact. Collateral ligaments and posterior, lateral corner: Intact. Extensor mechanism: Intact. .    Patellofemoral alignment: No patellar subluxation/tilt. Trochlear groove is not  hypoplastic. TT-TG distance: 7 mm    Joint fluid: Small joint effusion with multiple loose bodies within the  posterior intercondylar notch. There is a moderate-sized joint effusion with  complex synovitis and possibly a small amount of hemorrhage within it.     Medial meniscus: Large complex undersurface tear posterior horn with displaced  meniscal tissue into the notch. Body is subluxed from the joint    Lateral meniscus: Degenerative undersurface tear body lateral meniscus extending  to the junctions of both the anterior and posterior horns    Articular cartilage: 5 x 5 mm full-thickness cartilage defect central posterior  weightbearing surface lateral femoral condyle    Diffuse thinning of the weightbearing and posterior weightbearing surface medial  femoral condyle with high-grade partial-thickness cartilage loss posterior  weightbearing surface. There is thinning of the medial tibial plateau cartilage  with focal full-thickness fissuring at its peripheral rim. There is full-thickness cartilage loss central patella with delamination at its  lateral margin. Bone marrow: There is tricompartmental degenerative spurring with subchondral  degenerative changes medially. Soft tissue mass: None. Impression  IMPRESSION:    1 . Large complex undersurface tear posterior horn medial meniscus with  displaced meniscal tissue into the notch. Body is subluxed from the joint  2. Degenerative undersurface tear body lateral meniscus extending to the  junctions of the anterior and posterior horns  3. Tricompartmental cartilage loss most severe medially  4. Multiple loose bodies in the posterior intercondylar notch  5. Moderate-sized complex Baker's cyst which appears to contain hemorrhage     CT Results (most recent):  Results from Hospital Encounter encounter on 05/18/17    CT ABD PELV W CONT    Narrative  EXAM: CT ABDOMEN PELVIS WITH CONTRAST  INDICATION:  Right lower quadrant pain, evaluate for hernia. COMPARISON: None. CONTRAST: 100 mL of Isovue-370. TECHNIQUE:  Multislice helical CT was performed from the diaphragm to the symphysis pubis  during uneventful rapid bolus intravenous contrast administration. Oral contrast  was also administered.  Contiguous 5 mm axial images were reconstructed and lung  and soft tissue windows were generated. Coronal and sagittal reformations were  generated. CT dose reduction was achieved through use of a standardized protocol  tailored for this examination and automatic exposure control for dose  modulation. FINDINGS:  LOWER CHEST: The visualized portions of the lung bases are clear. ABDOMEN:  Liver: The liver is normal in size and contour with no focal abnormality. Gallbladder and bile ducts: There is no calcified gallstone or biliary  dilatation. Spleen: No abnormality. 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 Pancreas: No abnormality. Adrenal glands: No abnormality. Kidneys: No abnormality. PELVIS:  Reproductive organs: The uterus and ovaries are absent. Bladder: No abnormality. BOWEL AND MESENTERY: The small bowel is normal.  There is no mesenteric mass or  adenopathy. The appendix is normal.    PERITONEUM: There is no ascites or free intraperitoneal air. RETROPERITONEUM: The aorta  tapers without aneurysm. There is no retroperitoneal  adenopathy or mass. There is no pelvic mass or adenopathy. BONES AND SOFT TISSUES: The bones and soft tissues of the abdominal wall are  within normal limits. There is no inguinal or abdominal wall hernia. Impression  IMPRESSION:  1. No hernia or other acute abdominal or pelvic abnormality. 2. Status post hysterectomy and bilateral oophorectomy. 3. Thoracolumbar spondylosis with L4-L5 spondylolisthesis.        Reviewed records in [facility] today    Lab Review   Results for orders placed or performed during the hospital encounter of 90/42/42   METABOLIC PANEL, BASIC   Result Value Ref Range    Sodium 134 (L) 136 - 145 mmol/L    Potassium 4.0 3.5 - 5.1 mmol/L    Chloride 103 97 - 108 mmol/L    CO2 25 21 - 32 mmol/L    Anion gap 6 5 - 15 mmol/L    Glucose 114 (H) 65 - 100 mg/dL    BUN 13 6 - 20 MG/DL    Creatinine 0.82 0.55 - 1.02 MG/DL    BUN/Creatinine ratio 16 12 - 20      GFR est AA >60 >60 ml/min/1.73m2    GFR est non-AA >60 >60 ml/min/1.73m2    Calcium 7.9 (L) 8.5 - 10.1 MG/DL   HEMOGLOBIN   Result Value Ref Range    HGB 10.1 (L) 11.5 - 58.4 g/dL   METABOLIC PANEL, BASIC   Result Value Ref Range    Sodium 133 (L) 136 - 145 mmol/L    Potassium 3.3 (L) 3.5 - 5.1 mmol/L    Chloride 100 97 - 108 mmol/L    CO2 25 21 - 32 mmol/L    Anion gap 8 5 - 15 mmol/L    Glucose 102 (H) 65 - 100 mg/dL    BUN 11 6 - 20 MG/DL    Creatinine 0.77 0.55 - 1.02 MG/DL    BUN/Creatinine ratio 14 12 - 20      GFR est AA >60 >60 ml/min/1.73m2    GFR est non-AA >60 >60 ml/min/1.73m2    Calcium 8.3 (L) 8.5 - 10.1 MG/DL   HEMOGLOBIN   Result Value Ref Range    HGB 9.0 (L) 11.5 - 16.0 g/dL        Exam:  Visit Vitals  /86   Pulse (!) 59   Temp 98.3 °F (36.8 °C) (Temporal)   Resp 16   Ht 5' 2\" (1.575 m)   Wt 164 lb 3.2 oz (74.5 kg)   BMI 30.03 kg/m²     General:  Alert, cooperative, no distress. Head:  Normocephalic, without obvious abnormality, atraumatic. Respiratory:  Heart:   Non labored breathing  Regular rate and rhythm, no murmurs   Neck:   2+ carotids, no bruits   Extremities: Warm, no cyanosis or edema. Pulses: 2+ radial pulses. Neurologic:  MS: Alert and oriented x 4, speech intact. Language intact, able to name, repeat, and follow all commands. Attention and fund of knowledge appropriate. Recent and remote memory intact.   Cranial Nerves:  II: visual fields Full to confrontation   II: pupils Equal, round, reactive to light   II: optic disc No papilledema   III,VII: ptosis none   III,IV,VI: extraocular muscles  EOMI, no nystagmus or diplopia   V: facial light touch sensation  normal   VII: facial muscle function   symmetric   VIII: hearing intact   IX: soft palate elevation  normal   XI: trapezius strength  5/5   XI: sternocleidomastoid strength 5/5   XII: tongue  Midline     Motor: normal bulk and tone, tremor+++ left hand              Strength: 5/5 throughout, no PD  Sensory: Equivocal  sensation to LT, PP,temperature and vibration. Coordination: FTN and HTS intact, SHELLEY intact,Romberg negative  Gait: normal gait, able to heel, toe, and tandem walk  Reflexes: 2+ symmetric  Plantar: Withdrawn           Assessment/Plan       ICD-10-CM ICD-9-CM    1. Tremor, essential  G25.0 333.1    Plan:  I will start patient on Mysoline 50 mg p.o. nightly. Thank you very much for this consultation.          Signed:  Keesha Jewell MD  12/13/2021

## 2022-02-16 ENCOUNTER — OFFICE VISIT (OUTPATIENT)
Dept: URGENT CARE | Age: 67
End: 2022-02-16
Payer: MEDICARE

## 2022-02-16 VITALS
RESPIRATION RATE: 18 BRPM | DIASTOLIC BLOOD PRESSURE: 81 MMHG | WEIGHT: 169.3 LBS | SYSTOLIC BLOOD PRESSURE: 134 MMHG | HEIGHT: 62 IN | HEART RATE: 61 BPM | BODY MASS INDEX: 31.15 KG/M2 | OXYGEN SATURATION: 100 % | TEMPERATURE: 98.1 F

## 2022-02-16 DIAGNOSIS — U07.1 COVID-19: Primary | ICD-10-CM

## 2022-02-16 LAB — SARS-COV-2 PCR, POC: NEGATIVE

## 2022-02-16 PROCEDURE — G8417 CALC BMI ABV UP PARAM F/U: HCPCS | Performed by: NURSE PRACTITIONER

## 2022-02-16 PROCEDURE — 1090F PRES/ABSN URINE INCON ASSESS: CPT | Performed by: NURSE PRACTITIONER

## 2022-02-16 PROCEDURE — G8399 PT W/DXA RESULTS DOCUMENT: HCPCS | Performed by: NURSE PRACTITIONER

## 2022-02-16 PROCEDURE — G8432 DEP SCR NOT DOC, RNG: HCPCS | Performed by: NURSE PRACTITIONER

## 2022-02-16 PROCEDURE — G8427 DOCREV CUR MEDS BY ELIG CLIN: HCPCS | Performed by: NURSE PRACTITIONER

## 2022-02-16 PROCEDURE — 99212 OFFICE O/P EST SF 10 MIN: CPT | Performed by: NURSE PRACTITIONER

## 2022-02-16 PROCEDURE — G8536 NO DOC ELDER MAL SCRN: HCPCS | Performed by: NURSE PRACTITIONER

## 2022-02-16 PROCEDURE — 87635 SARS-COV-2 COVID-19 AMP PRB: CPT | Performed by: NURSE PRACTITIONER

## 2022-02-16 PROCEDURE — G9899 SCRN MAM PERF RSLTS DOC: HCPCS | Performed by: NURSE PRACTITIONER

## 2022-02-16 PROCEDURE — 1101F PT FALLS ASSESS-DOCD LE1/YR: CPT | Performed by: NURSE PRACTITIONER

## 2022-02-16 PROCEDURE — 3017F COLORECTAL CA SCREEN DOC REV: CPT | Performed by: NURSE PRACTITIONER

## 2022-02-16 NOTE — PROGRESS NOTES
Subjective: (As above and below)     The patient/guardian gave verbal consent to treat. Chief Complaint   Patient presents with    Covid Testing     rapid test was positive 02/02/22. need re test to return to family. Jerel Mark is a 69 y.o. female who presents for covid 23 re testing  Was positive on 02/02/2022. At the time did have cough, congestion, cold symptoms. Overall has improved. No SOB or recent fevers. Would like re testing today. ROS  Review of Systems - negative except as listed above    Reviewed PmHx, RxHx, FmHx, SocHx, AllgHx and updated in chart. Family History   Problem Relation Age of Onset    Heart Disease Mother     Kidney Disease Mother     Heart Disease Father     Hypertension Father        Past Medical History:   Diagnosis Date    Coagulation disorder ([address]     takes Eliquis    Depression     GERD (gastroesophageal reflux disease)     H/O hammer toe correction     Nausea & vomiting     Osteoarthritis     knee, thumb    Osteoporosis     Psychiatric disorder     anxiety, seasonal affective disorder    PUD (peptic ulcer disease) 2015    required blood transfusion    SVT [facility]supraventricular tachycardia) ([facility])     Afib takes Eliquis      Social History     Socioeconomic History    Marital status:    Tobacco Use    Smoking status: Never Smoker    Smokeless tobacco: Never Used   Substance and Sexual Activity    Alcohol use: Yes     Alcohol/week: 5.0 standard drinks     Types: 5 Glasses of wine per week     Comment: 5 per week    Drug use: Yes     Types: Prescription, OTC    Sexual activity: Yes     Partners: Male     Birth control/protection: Surgical          Current Outpatient Medications   Medication Sig    ALPRAZolam (XANAX) 0.5 mg tablet     zolpidem (AMBIEN) 5 mg tablet 1 tablet at bedtime    primidone (MYSOLINE) 50 mg tablet Take 1 Tablet by mouth nightly.  Indications: essential tremor    folic acid/multivit-min/lutein (SPECTRAVITE ADULT 50 PLUS,LUT, PO) Take 1 Tab by mouth daily.  sertraline (ZOLOFT) 50 mg tablet Take 50 mg by mouth two (2) times a day.  alendronate (FOSAMAX) 70 mg tablet Take  by mouth every seven (7) days.  apixaban (ELIQUIS PO) Take 5 mg by mouth two (2) times a day.  fish oil-omega-3 fatty acids 340-1,000 mg capsule Take 1 Cap by mouth daily.  flecainide (TAMBOCOR) 50 mg tablet Take 50 mg by mouth two (2) times a day.  conjugated estrogens (PREMARIN) 0.625 mg/gram vaginal cream Insert 0.5 g into vagina daily.  Calcium-Cholecalciferol, D3, (CALCIUM 600 WITH VITAMIN D3) 600 mg(1,500mg) -400 unit cap Take 2 Caps by mouth daily.  senna-docusate (PERICOLACE) 8.6-50 mg per tablet Take 1 Tab by mouth daily.  prednisoLONE acetate (PRED FORTE) 1 % ophthalmic suspension Administer 1 Drop to both eyes four (4) times daily. No current facility-administered medications for this visit. Objective:     Vitals:    02/16/22 1332   BP: 134/81   Pulse: 61   Resp: 18   Temp: 98.1 °F (36.7 °C)   SpO2: 100%   Weight: 169 lb 4.8 oz (76.8 kg)   Height: 5' 2\" (1.575 m)       Physical Exam  General appearance  appears well hydrated and does not appear toxic, no acute distress  Eyes - EOMs intact. Non injected. No scleral icterus   Ears - no external swelling  Neck/Lymphatics  trachea midline, full AROM  Chest - Normal breathing effort no wheeze rales, rhonchi or diminishments bilaterally. Heart - RRR, no murmurs  Skin - no observable rashes or pallor  Neurologic- alert and oriented x 3  Psychiatric- normal mood, behavior and though content. Assessment/ Plan:     1.  COVID-19    - POCT COVID-19, SARS-COV-2, PCR      Covid 19 test result today is NEGATIVE  May exit quarantine/isolation based on current CDC guidelines      Test Results:  Recent Results (from the past 6 hour(s))   POCT COVID-19, SARS-COV-2, PCR    Collection Time: 02/16/22  1:56 PM   Result Value Ref Range    SARS-COV-2 PCR, POC Negative Negative               [place] Kevin, NP

## 2022-03-07 RX ORDER — PRIMIDONE 50 MG/1
TABLET ORAL
Qty: 90 TABLET | Refills: 1 | Status: SHIPPED | OUTPATIENT
Start: 2022-03-07 | End: 2022-03-18 | Stop reason: SDUPTHER

## 2022-03-18 ENCOUNTER — OFFICE VISIT (OUTPATIENT)
Dept: NEUROLOGY | Age: 67
End: 2022-03-18
Payer: MEDICARE

## 2022-03-18 VITALS
SYSTOLIC BLOOD PRESSURE: 141 MMHG | HEART RATE: 60 BPM | BODY MASS INDEX: 31.06 KG/M2 | WEIGHT: 168.8 LBS | HEIGHT: 62 IN | OXYGEN SATURATION: 98 % | TEMPERATURE: 98.4 F | DIASTOLIC BLOOD PRESSURE: 83 MMHG

## 2022-03-18 DIAGNOSIS — G25.0 TREMOR, ESSENTIAL: Primary | ICD-10-CM

## 2022-03-18 DIAGNOSIS — F41.1 GAD (GENERALIZED ANXIETY DISORDER): ICD-10-CM

## 2022-03-18 PROCEDURE — 3017F COLORECTAL CA SCREEN DOC REV: CPT | Performed by: PSYCHIATRY & NEUROLOGY

## 2022-03-18 PROCEDURE — G8427 DOCREV CUR MEDS BY ELIG CLIN: HCPCS | Performed by: PSYCHIATRY & NEUROLOGY

## 2022-03-18 PROCEDURE — G8536 NO DOC ELDER MAL SCRN: HCPCS | Performed by: PSYCHIATRY & NEUROLOGY

## 2022-03-18 PROCEDURE — G8417 CALC BMI ABV UP PARAM F/U: HCPCS | Performed by: PSYCHIATRY & NEUROLOGY

## 2022-03-18 PROCEDURE — 99214 OFFICE O/P EST MOD 30 MIN: CPT | Performed by: PSYCHIATRY & NEUROLOGY

## 2022-03-18 PROCEDURE — G8432 DEP SCR NOT DOC, RNG: HCPCS | Performed by: PSYCHIATRY & NEUROLOGY

## 2022-03-18 PROCEDURE — 1090F PRES/ABSN URINE INCON ASSESS: CPT | Performed by: PSYCHIATRY & NEUROLOGY

## 2022-03-18 PROCEDURE — G9899 SCRN MAM PERF RSLTS DOC: HCPCS | Performed by: PSYCHIATRY & NEUROLOGY

## 2022-03-18 PROCEDURE — 1101F PT FALLS ASSESS-DOCD LE1/YR: CPT | Performed by: PSYCHIATRY & NEUROLOGY

## 2022-03-18 PROCEDURE — G8399 PT W/DXA RESULTS DOCUMENT: HCPCS | Performed by: PSYCHIATRY & NEUROLOGY

## 2022-03-18 RX ORDER — PRIMIDONE 50 MG/1
50 TABLET ORAL 3 TIMES DAILY
Qty: 90 TABLET | Refills: 5 | Status: SHIPPED | OUTPATIENT
Start: 2022-03-18 | End: 2022-09-19 | Stop reason: SDUPTHER

## 2022-03-18 RX ORDER — FEXOFENADINE HCL AND PSEUDOEPHEDRINE HCI 180; 240 MG/1; MG/1
1 TABLET, EXTENDED RELEASE ORAL DAILY
COMMUNITY

## 2022-03-18 NOTE — PROGRESS NOTES
Neurology Progress Note    NAME:  Wesley Recio   :   1955   MRN:   [id number]     Date/Time:  3/18/2022  Subjective:      Wesley Recio is a 69 y.o. female here today for tremors. Patient says the tremor has improved some with Mysoline, however, she still experiences it. She noted that it has not gotten any worse since the last visit. After discussing with patient, we agreed that Mysoline will be adjusted to 50 mg 3 times daily and monitor how patient does with headache. Review of Systems - General ROS: positive for  - fatigue and sleep disturbance  Psychological ROS: positive for - anxiety, depression and sleep disturbances  Ophthalmic ROS: positive for - decreased vision  ENT ROS: positive for - tinnitus and visual changes  Allergy and Immunology ROS: negative  Hematological and Lymphatic ROS: negative  Endocrine ROS: negative  Respiratory ROS: no cough, shortness of breath, or wheezing  Cardiovascular ROS: no chest pain or dyspnea on exertion  Gastrointestinal ROS: no abdominal pain, change in bowel habits, or black or bloody stools  Genito-Urinary ROS: no dysuria, trouble voiding, or hematuria  Musculoskeletal ROS: positive for - muscular weakness  Neurological ROS: positive for - tremors and visual changes  Dermatological ROS: negative    Medications reviewed:  Current Outpatient Medications   Medication Sig Dispense Refill    fexofenadine-pseudoephedrine (Allegra-D 24 Hour) 180-240 mg per tablet Take 1 Tablet by mouth daily.  primidone (MYSOLINE) 50 mg tablet Take 1 Tablet by mouth three (3) times daily. 90 Tablet 5    ALPRAZolam (XANAX) 0.5 mg tablet       zolpidem (AMBIEN) 5 mg tablet 1 tablet at bedtime      folic acid/multivit-min/lutein (SPECTRAVITE ADULT 50 PLUS,LUT, PO) Take 1 Tab by mouth daily.  sertraline (ZOLOFT) 50 mg tablet Take 50 mg by mouth two (2) times a day.  alendronate (FOSAMAX) 70 mg tablet Take  by mouth every seven (7) days.       apixaban (ELIQUIS PO) Take 5 mg by mouth two (2) times a day.  fish oil-omega-3 fatty acids 340-1,000 mg capsule Take 1 Cap by mouth daily.  flecainide (TAMBOCOR) 50 mg tablet Take 50 mg by mouth two (2) times a day.  conjugated estrogens (PREMARIN) 0.625 mg/gram vaginal cream Insert 0.5 g into vagina daily.  Calcium-Cholecalciferol, D3, (CALCIUM 600 WITH VITAMIN D3) 600 mg(1,500mg) -400 unit cap Take 2 Caps by mouth daily.  senna-docusate (PERICOLACE) 8.6-50 mg per tablet Take 1 Tab by mouth daily. 30 Tab 0    prednisoLONE acetate (PRED FORTE) 1 % ophthalmic suspension Administer 1 Drop to both eyes four (4) times daily.           Objective:   Vitals:  Vitals:    03/18/22 1206   BP: (!) 141/83   Pulse: 60   Temp: 98.4 °F (36.9 °C)   TempSrc: Temporal   SpO2: 98%   Weight: 168 lb 12.8 oz (76.6 kg)   Height: 5' 2\" (1.575 m)   PainSc:   0 - No pain               Lab Data Reviewed:  Lab Results   Component Value Date/Time    WBC 7.1 11/04/2019 11:01 AM    HCT 40.0 11/04/2019 11:01 AM    HGB 9.0 (L) 11/13/2019 02:49 AM    PLATELET 832 80/82/0576 11:01 AM       Lab Results   Component Value Date/Time    Sodium 133 (L) 11/13/2019 02:49 AM    Potassium 3.3 (L) 11/13/2019 02:49 AM    Chloride 100 11/13/2019 02:49 AM    CO2 25 11/13/2019 02:49 AM    Glucose 102 (H) 11/13/2019 02:49 AM    BUN 11 11/13/2019 02:49 AM    Creatinine 0.77 11/13/2019 02:49 AM    Calcium 8.3 (L) 11/13/2019 02:49 AM       No components found for: TROPQUANT    No results found for: ALEXEI      Lab Results   Component Value Date/Time    Hemoglobin A1c 5.4 11/04/2019 11:01 AM        No results found for: B12LT, FOL, RBCF    No results found for: ALEXEI, ANARX, ANAIGG, XBANA    No results found for: CHOL, CHOLPOCT, CHOLX, CHLST, CHOLV, HDL, HDLPOC, HDLP, LDL, LDLCPOC, LDLC, DLDLP, VLDLC, VLDL, TGLX, TRIGL, TRIGP, TGLPOCT, CHHD, CHHDX      CT Results (recent):  Results from Hospital Encounter encounter on 05/18/17    CT ABD PELV W CONT    Narrative  EXAM: CT ABDOMEN PELVIS WITH CONTRAST  INDICATION:  Right lower quadrant pain, evaluate for hernia. COMPARISON: None. CONTRAST: 100 mL of Isovue-370. TECHNIQUE:  Multislice helical CT was performed from the diaphragm to the symphysis pubis  during uneventful rapid bolus intravenous contrast administration. Oral contrast  was also administered. Contiguous 5 mm axial images were reconstructed and lung  and soft tissue windows were generated. Coronal and sagittal reformations were  generated. CT dose reduction was achieved through use of a standardized protocol  tailored for this examination and automatic exposure control for dose  modulation. FINDINGS:  LOWER CHEST: The visualized portions of the lung bases are clear. ABDOMEN:  Liver: The liver is normal in size and contour with no focal abnormality. Gallbladder and bile ducts: There is no calcified gallstone or biliary  dilatation. Spleen: No abnormality. 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 Pancreas: No abnormality. Adrenal glands: No abnormality. Kidneys: No abnormality. PELVIS:  Reproductive organs: The uterus and ovaries are absent. Bladder: No abnormality. BOWEL AND MESENTERY: The small bowel is normal.  There is no mesenteric mass or  adenopathy. The appendix is normal.    PERITONEUM: There is no ascites or free intraperitoneal air. RETROPERITONEUM: The aorta  tapers without aneurysm. There is no retroperitoneal  adenopathy or mass. There is no pelvic mass or adenopathy. BONES AND SOFT TISSUES: The bones and soft tissues of the abdominal wall are  within normal limits. There is no inguinal or abdominal wall hernia. Impression  IMPRESSION:  1. No hernia or other acute abdominal or pelvic abnormality. 2. Status post hysterectomy and bilateral oophorectomy. 3. Thoracolumbar spondylosis with L4-L5 spondylolisthesis.       MRI Results (recent):  Results from [place] encounter on 10/10/19    MRI KNEE RT WO CONT    Narrative  EXAM:  MRI KNEE RT WO CONT    INDICATION: Right knee osteoarthritis    COMPARISON: None    TECHNIQUE: Axial T2 fat-saturated and proton density fat-saturated; coronal T1  and proton density fat-saturated; and sagittal T2 fat-saturated, proton density  fat-saturated, and gradient echo MRI of the right knee . CONTRAST:  None. FINDINGS:    ACL and PCL: Intact. Collateral ligaments and posterior, lateral corner: Intact. Extensor mechanism: Intact. .    Patellofemoral alignment: No patellar subluxation/tilt. Trochlear groove is not  hypoplastic. TT-TG distance: 7 mm    Joint fluid: Small joint effusion with multiple loose bodies within the  posterior intercondylar notch. There is a moderate-sized joint effusion with  complex synovitis and possibly a small amount of hemorrhage within it. Medial meniscus: Large complex undersurface tear posterior horn with displaced  meniscal tissue into the notch. Body is subluxed from the joint    Lateral meniscus: Degenerative undersurface tear body lateral meniscus extending  to the junctions of both the anterior and posterior horns    Articular cartilage: 5 x 5 mm full-thickness cartilage defect central posterior  weightbearing surface lateral femoral condyle    Diffuse thinning of the weightbearing and posterior weightbearing surface medial  femoral condyle with high-grade partial-thickness cartilage loss posterior  weightbearing surface. There is thinning of the medial tibial plateau cartilage  with focal full-thickness fissuring at its peripheral rim. There is full-thickness cartilage loss central patella with delamination at its  lateral margin. Bone marrow: There is tricompartmental degenerative spurring with subchondral  degenerative changes medially. Soft tissue mass: None. Impression  IMPRESSION:    1 .  Large complex undersurface tear posterior horn medial meniscus with  displaced meniscal tissue into the notch. Body is subluxed from the joint  2. Degenerative undersurface tear body lateral meniscus extending to the  junctions of the anterior and posterior horns  3. Tricompartmental cartilage loss most severe medially  4. Multiple loose bodies in the posterior intercondylar notch  5. Moderate-sized complex Baker's cyst which appears to contain hemorrhage      IR Results (recent):  No results found for this or any previous visit. VAS/US Results (recent): PHYSICAL EXAM:  General:    Alert, cooperative, no distress, appears stated age. Head:   Normocephalic, without obvious abnormality, atraumatic. Eyes:   Conjunctivae/corneas clear. PERRLA  Nose:  Nares normal. No drainage or sinus tenderness. Throat:    Lips, mucosa, and tongue normal.  No Thrush  Neck:  Supple, symmetrical,  no adenopathy, thyroid: non tender    no carotid bruit and no JVD. Back:    Symmetric,  No CVA tenderness. Lungs:   Clear to auscultation bilaterally. No Wheezing or Rhonchi. No rales. Chest wall:  No tenderness or deformity. No Accessory muscle use. Heart:   Regular rate and rhythm,  no murmur, rub or gallop. Abdomen:   Soft, non-tender. Not distended. Bowel sounds normal. No masses  Extremities: Extremities normal, atraumatic, No cyanosis. No edema. No clubbing  Skin:     Texture, turgor normal. No rashes or lesions. Not Jaundiced  Lymph nodes: Cervical, supraclavicular normal.  Psych:  Good insight. Not depressed. Not anxious or agitated. NEUROLOGICAL EXAM:  Appearance: The patient is well developed, well nourished, provides a coherent history and is in no acute distress. Mental Status: Oriented to time, place and person. Mood and affect appropriate. Cranial Nerves:   Intact visual fields. Fundi are benign. TERRELL, EOM's full, no nystagmus, no ptosis. Facial sensation is normal. Corneal reflexes are intact. Facial movement is symmetric. Hearing is normal bilaterally.  Palate is midline with normal sternocleidomastoid and trapezius muscles are normal. Tongue is midline. Motor:  5/5 strength in upper and lower proximal and distal muscles. Normal bulk and tone. No fasciculations. Reflexes:   Deep tendon reflexes 2+/4 and symmetrical.   Sensory:    Equivocal sensation to touch, pinprick and vibration. Gait:  Normal gait. Tremor:    Tremor  noted. Cerebellar:  No cerebellar signs present. Neurovascular:  Normal heart sounds and regular rhythm, peripheral pulses intact, and no carotid bruits. Assesment  1. Tremor, essential  Mysoline adjusted to 50 mg p.o. 3 times daily    2. DEMETRIO (generalized anxiety disorder)  Stable    ___________________________________________________  PLAN: Medication and plan discussed with patient      ICD-10-CM ICD-9-CM    1. Tremor, essential  G25.0 333.1    2. DEMETRIO (generalized anxiety disorder)  F41.1 300.02      Follow-up and Dispositions    · Return in about 6 months (around 9/18/2022).                ___________________________________________________    Attending Physician: Justine Beck MD

## 2022-03-19 PROBLEM — Z96.659 STATUS POST TOTAL KNEE REPLACEMENT: Status: ACTIVE | Noted: 2019-11-11

## 2022-03-20 PROBLEM — Z98.890 S/P ABLATION OF ATRIAL FIBRILLATION: Status: ACTIVE | Noted: 2017-08-31

## 2022-03-20 PROBLEM — Z86.79 S/P ABLATION OF ATRIAL FIBRILLATION: Status: ACTIVE | Noted: 2017-08-31

## 2022-09-19 ENCOUNTER — OFFICE VISIT (OUTPATIENT)
Dept: NEUROLOGY | Age: 67
End: 2022-09-19
Payer: MEDICARE

## 2022-09-19 VITALS
RESPIRATION RATE: 18 BRPM | WEIGHT: 165.8 LBS | OXYGEN SATURATION: 98 % | HEART RATE: 78 BPM | TEMPERATURE: 98.1 F | SYSTOLIC BLOOD PRESSURE: 136 MMHG | HEIGHT: 62 IN | BODY MASS INDEX: 30.51 KG/M2 | DIASTOLIC BLOOD PRESSURE: 75 MMHG

## 2022-09-19 DIAGNOSIS — F41.1 GAD (GENERALIZED ANXIETY DISORDER): ICD-10-CM

## 2022-09-19 DIAGNOSIS — G25.0 TREMOR, ESSENTIAL: Primary | ICD-10-CM

## 2022-09-19 RX ORDER — PROPRANOLOL HYDROCHLORIDE 60 MG/1
60 CAPSULE, EXTENDED RELEASE ORAL DAILY
Qty: 30 CAPSULE | Refills: 4 | Status: SHIPPED | OUTPATIENT
Start: 2022-09-19 | End: 2022-10-01 | Stop reason: DRUGHIGH

## 2022-09-19 RX ORDER — ACETAMINOPHEN 500 MG
500 TABLET ORAL AS NEEDED
COMMUNITY

## 2022-09-19 RX ORDER — PRIMIDONE 50 MG/1
50 TABLET ORAL 3 TIMES DAILY
Qty: 90 TABLET | Refills: 6 | Status: SHIPPED | OUTPATIENT
Start: 2022-09-19 | End: 2022-09-23

## 2022-09-19 NOTE — PROGRESS NOTES
Neurology Progress Note    NAME:  Gian Aragon   :   1955   MRN:   [id number]     Date/Time:  2022  Subjective:    Gian Aragon is a 69 y.o. female here today for tremors. Patient says the tremor is still doing well with Mysoline, however, she still experiences it. She noted that it has not gotten any worse since the last visit. Patient is still experiencing headaches  I will add propranolol as this will help both the tremor and headache  Review of Systems - General ROS: positive for  - fatigue and sleep disturbance  Psychological ROS: positive for - anxiety, depression and sleep disturbances  Ophthalmic ROS: positive for - decreased vision  ENT ROS: positive for - tinnitus and visual changes  Allergy and Immunology ROS: negative  Hematological and Lymphatic ROS: negative  Endocrine ROS: negative  Respiratory ROS: no cough, shortness of breath, or wheezing  Cardiovascular ROS: no chest pain or dyspnea on exertion  Gastrointestinal ROS: no abdominal pain, change in bowel habits, or black or bloody stools  Genito-Urinary ROS: no dysuria, trouble voiding, or hematuria  Musculoskeletal ROS: positive for - muscular weakness  Neurological ROS: positive for - tremors and visual changes  Dermatological ROS: negative        Medications reviewed:  Current Outpatient Medications   Medication Sig Dispense Refill    acetaminophen (Tylenol Extra Strength) 500 mg tablet Take 500 mg by mouth as needed for Pain. primidone (MYSOLINE) 50 mg tablet Take 1 Tablet by mouth three (3) times daily. 90 Tablet 6    propranolol LA (INDERAL LA) 60 mg SR capsule Take 1 Capsule by mouth daily. 30 Capsule 4    fexofenadine-pseudoephedrine (ALLEGRA-D 24) 180-240 mg per tablet Take 1 Tablet by mouth daily. ALPRAZolam (XANAX) 0.5 mg tablet       zolpidem (AMBIEN) 5 mg tablet 1 tablet at bedtime      folic acid/multivit-min/lutein (SPECTRAVITE ADULT 50 PLUS,LUT, PO) Take 1 Tab by mouth daily.       sertraline (ZOLOFT) 50 mg tablet Take 50 mg by mouth two (2) times a day. alendronate (FOSAMAX) 70 mg tablet Take  by mouth every seven (7) days. apixaban (ELIQUIS PO) Take 5 mg by mouth two (2) times a day. fish oil-omega-3 fatty acids 340-1,000 mg capsule Take 1 Cap by mouth daily. flecainide (TAMBOCOR) 50 mg tablet Take 50 mg by mouth two (2) times a day. conjugated estrogens (PREMARIN) 0.625 mg/gram vaginal cream Insert 0.5 g into vagina daily. Calcium-Cholecalciferol, D3, 600 mg-10 mcg (400 unit) cap Take 2 Caps by mouth daily. senna-docusate (PERICOLACE) 8.6-50 mg per tablet Take 1 Tab by mouth daily. (Patient not taking: Reported on 9/19/2022) 30 Tab 0    prednisoLONE acetate (PRED FORTE) 1 % ophthalmic suspension Administer 1 Drop to both eyes four (4) times daily.  (Patient not taking: Reported on 9/19/2022)          Objective:   Vitals:  Vitals:    09/19/22 1151   BP: 136/75   Pulse: 78   Resp: 18   Temp: 98.1 °F (36.7 °C)   TempSrc: Temporal   SpO2: 98%   Weight: 165 lb 12.8 oz (75.2 kg)   Height: 5' 2\" (1.575 m)   PainSc:   3               Lab Data Reviewed:  Lab Results   Component Value Date/Time    WBC 7.1 11/04/2019 11:01 AM    HCT 40.0 11/04/2019 11:01 AM    HGB 9.0 (L) 11/13/2019 02:49 AM    PLATELET 511 89/78/0518 11:01 AM       Lab Results   Component Value Date/Time    Sodium 133 (L) 11/13/2019 02:49 AM    Potassium 3.3 (L) 11/13/2019 02:49 AM    Chloride 100 11/13/2019 02:49 AM    CO2 25 11/13/2019 02:49 AM    Glucose 102 (H) 11/13/2019 02:49 AM    BUN 11 11/13/2019 02:49 AM    Creatinine 0.77 11/13/2019 02:49 AM    Calcium 8.3 (L) 11/13/2019 02:49 AM       No components found for: TROPQUANT    No results found for: ALEXEI      Lab Results   Component Value Date/Time    Hemoglobin A1c 5.4 11/04/2019 11:01 AM        No results found for: B12LT, FOL, RBCF    No results found for: ALEXEI, ANARX, ANAIGG, XBANA    No results found for: CHOL, CHOLPOCT, CHOLX, CHLST, CHOLV, HDL, [address], HDLP, LDL, LDLCPOC, LDLC, DLDLP, VLDLC, VLDL, TGLX, TRIGL, TRIGP, TGLPOCT, CHHD, CHHDX      CT Results (recent):  Results from Hospital Encounter encounter on 05/18/17    CT ABD PELV W CONT    Narrative  EXAM: CT ABDOMEN PELVIS WITH CONTRAST  INDICATION:  Right lower quadrant pain, evaluate for hernia. COMPARISON: None. CONTRAST: 100 mL of Isovue-370. TECHNIQUE:  Multislice helical CT was performed from the diaphragm to the symphysis pubis  during uneventful rapid bolus intravenous contrast administration. Oral contrast  was also administered. Contiguous 5 mm axial images were reconstructed and lung  and soft tissue windows were generated. Coronal and sagittal reformations were  generated. CT dose reduction was achieved through use of a standardized protocol  tailored for this examination and automatic exposure control for dose  modulation. FINDINGS:  LOWER CHEST: The visualized portions of the lung bases are clear. ABDOMEN:  Liver: The liver is normal in size and contour with no focal abnormality. Gallbladder and bile ducts: There is no calcified gallstone or biliary  dilatation. Spleen: No abnormality. 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 Pancreas: No abnormality. Adrenal glands: No abnormality. Kidneys: No abnormality. PELVIS:  Reproductive organs: The uterus and ovaries are absent. Bladder: No abnormality. BOWEL AND MESENTERY: The small bowel is normal.  There is no mesenteric mass or  adenopathy. The appendix is normal.    PERITONEUM: There is no ascites or free intraperitoneal air. RETROPERITONEUM: The aorta  tapers without aneurysm. There is no retroperitoneal  adenopathy or mass. There is no pelvic mass or adenopathy. BONES AND SOFT TISSUES: The bones and soft tissues of the abdominal wall are  within normal limits. There is no inguinal or abdominal wall hernia. Impression  IMPRESSION:  1. No hernia or other acute abdominal or pelvic abnormality.   2. Status post hysterectomy and bilateral oophorectomy. 3. Thoracolumbar spondylosis with L4-L5 spondylolisthesis. MRI Results (recent):  Results from [place] encounter on 10/10/19    MRI KNEE RT WO CONT    Narrative  EXAM:  MRI KNEE RT WO CONT    INDICATION: Right knee osteoarthritis    COMPARISON: None    TECHNIQUE: Axial T2 fat-saturated and proton density fat-saturated; coronal T1  and proton density fat-saturated; and sagittal T2 fat-saturated, proton density  fat-saturated, and gradient echo MRI of the right knee . CONTRAST:  None. FINDINGS:    ACL and PCL: Intact. Collateral ligaments and posterior, lateral corner: Intact. Extensor mechanism: Intact. .    Patellofemoral alignment: No patellar subluxation/tilt. Trochlear groove is not  hypoplastic. TT-TG distance: 7 mm    Joint fluid: Small joint effusion with multiple loose bodies within the  posterior intercondylar notch. There is a moderate-sized joint effusion with  complex synovitis and possibly a small amount of hemorrhage within it. Medial meniscus: Large complex undersurface tear posterior horn with displaced  meniscal tissue into the notch. Body is subluxed from the joint    Lateral meniscus: Degenerative undersurface tear body lateral meniscus extending  to the junctions of both the anterior and posterior horns    Articular cartilage: 5 x 5 mm full-thickness cartilage defect central posterior  weightbearing surface lateral femoral condyle    Diffuse thinning of the weightbearing and posterior weightbearing surface medial  femoral condyle with high-grade partial-thickness cartilage loss posterior  weightbearing surface. There is thinning of the medial tibial plateau cartilage  with focal full-thickness fissuring at its peripheral rim. There is full-thickness cartilage loss central patella with delamination at its  lateral margin. Bone marrow:  There is tricompartmental degenerative spurring with subchondral  degenerative changes medially. Soft tissue mass: None. Impression  IMPRESSION:    1 . Large complex undersurface tear posterior horn medial meniscus with  displaced meniscal tissue into the notch. Body is subluxed from the joint  2. Degenerative undersurface tear body lateral meniscus extending to the  junctions of the anterior and posterior horns  3. Tricompartmental cartilage loss most severe medially  4. Multiple loose bodies in the posterior intercondylar notch  5. Moderate-sized complex Baker's cyst which appears to contain hemorrhage      IR Results (recent):  No results found for this or any previous visit. VAS/US Results (recent): PHYSICAL EXAM:  General:    Alert, cooperative, no distress, appears stated age. Head:   Normocephalic, without obvious abnormality, atraumatic. Eyes:   Conjunctivae/corneas clear. PERRLA  Nose:  Nares normal. No drainage or sinus tenderness. Throat:    Lips, mucosa, and tongue normal.  No Thrush  Neck:  Supple, symmetrical,  no adenopathy, thyroid: non tender    no carotid bruit and no JVD. Back:    Symmetric,  No CVA tenderness. Lungs:   Clear to auscultation bilaterally. No Wheezing or Rhonchi. No rales. Chest wall:  No tenderness or deformity. No Accessory muscle use. Heart:   Regular rate and rhythm,  no murmur, rub or gallop. Abdomen:   Soft, non-tender. Not distended. Bowel sounds normal. No masses  Extremities: Extremities normal, atraumatic, No cyanosis. No edema. No clubbing  Skin:     Texture, turgor normal. No rashes or lesions. Not Jaundiced  Lymph nodes: Cervical, supraclavicular normal.  Psych:  Good insight. Not depressed. Not anxious or agitated. NEUROLOGICAL EXAM:  Appearance: The patient is well developed, well nourished, provides a coherent history and is in no acute distress. Mental Status: Oriented to time, place and person. Mood and affect appropriate. Cranial Nerves:   Intact visual fields.  Fundi are benign. TERRELL, EOM's full, no nystagmus, no ptosis. Facial sensation is normal. Corneal reflexes are intact. Facial movement is symmetric. Hearing is normal bilaterally. Palate is midline with normal sternocleidomastoid and trapezius muscles are normal. Tongue is midline. Motor:  5/5 strength in upper and lower proximal and distal muscles. Normal bulk and tone. No fasciculations. Reflexes:   Deep tendon reflexes 2+/4 and symmetrical.   Sensory:    Equivocal sensation to touch, pinprick and vibration. Gait:  Normal gait. Tremor:    Tremor  noted. Cerebellar:  No cerebellar signs present. Neurovascular:  Normal heart sounds and regular rhythm, peripheral pulses intact, and no carotid bruits. Assesment  1. Tremor, essential  Primidone  Propranolol  2. DEMETRIO (generalized anxiety disorder)  Stable    ___________________________________________________  PLAN: Patient and plan discussed with patient      ICD-10-CM ICD-9-CM    1. Tremor, essential  G25.0 333.1       2. DEMETRIO (generalized anxiety disorder)  F41.1 300.02         Follow-up and Dispositions    Return in about 6 months (around 3/19/2023).          :    ___________________________________________________    Attending Physician: Sylvia Wilson MD

## 2022-09-19 NOTE — PROGRESS NOTES
Chief Complaint   Patient presents with    Follow-up    Tremors    1. Have you been to the ER, urgent care clinic since your last visit? Yes [facility] reaction to fly bite. Hospitalized since your last visit?  no   no  2. Have you seen or consulted any other health care providers outside of the [address] [place] [address] since your last visit? Include any pap smears or colon screening.

## 2022-09-23 RX ORDER — PRIMIDONE 50 MG/1
TABLET ORAL
Qty: 90 TABLET | Refills: 1 | Status: SHIPPED | OUTPATIENT
Start: 2022-09-23

## 2022-09-26 ENCOUNTER — TELEPHONE (OUTPATIENT)
Dept: NEUROLOGY | Age: 67
End: 2022-09-26

## 2022-09-26 NOTE — TELEPHONE ENCOUNTER
Pt is having a decrease in tremors on propranolol but her heart rate is dropping into the 40's. Pt's cardiologist, Noah Cortes, has recommended lowering dose to 30mg. Is this something Dr. Calvin Gomes agrees with? Please send new script to [facility] on [address].

## 2022-09-30 ENCOUNTER — TRANSCRIBE ORDER (OUTPATIENT)
Dept: SCHEDULING | Age: 67
End: 2022-09-30

## 2022-09-30 DIAGNOSIS — Z12.31 SCREENING MAMMOGRAM FOR HIGH-RISK PATIENT: Primary | ICD-10-CM

## 2022-10-01 RX ORDER — PROPRANOLOL HYDROCHLORIDE 10 MG/1
10 TABLET ORAL 3 TIMES DAILY
Qty: 90 TABLET | Refills: 3 | Status: SHIPPED | OUTPATIENT
Start: 2022-10-01

## 2022-10-03 NOTE — TELEPHONE ENCOUNTER
Message  Received: 2 days ago  Margarito Hollis MD sent to Jennifer Leal LPN  Caller: Unspecified (1 week ago,  3:29 PM)  There is no 30 mg, however, I sent in to the pharmacy immediate release propanolol 10 mg 3 times a day         Called pt,verified pt with two pt identifiers, advised pt I had received her message and sent to  to advise on. He advised yes she can switch over to propanolol 30 mg daily, however there is no 30 mg tab. He sent in propanolol 10 mg tab TID to pharmacy. Verified pharmacy where it was sent. Pt verbalized understanding.

## 2022-10-31 ENCOUNTER — HOSPITAL ENCOUNTER (OUTPATIENT)
Dept: MAMMOGRAPHY | Age: 67
Discharge: HOME | End: 2022-10-31
Payer: MEDICARE

## 2022-10-31 DIAGNOSIS — Z12.31 SCREENING MAMMOGRAM FOR HIGH-RISK PATIENT: ICD-10-CM

## 2022-10-31 PROCEDURE — 77063 BREAST TOMOSYNTHESIS BI: CPT

## 2022-12-09 ENCOUNTER — OFFICE VISIT (OUTPATIENT)
Dept: URGENT CARE | Age: 67
End: 2022-12-09
Payer: MEDICARE

## 2022-12-09 VITALS
DIASTOLIC BLOOD PRESSURE: 70 MMHG | TEMPERATURE: 98.3 F | WEIGHT: 158 LBS | OXYGEN SATURATION: 100 % | HEART RATE: 53 BPM | HEIGHT: 63 IN | RESPIRATION RATE: 16 BRPM | BODY MASS INDEX: 28 KG/M2 | SYSTOLIC BLOOD PRESSURE: 114 MMHG

## 2022-12-09 DIAGNOSIS — K61.1 PERIRECTAL ABSCESS: Primary | ICD-10-CM

## 2022-12-09 RX ORDER — AMOXICILLIN AND CLAVULANATE POTASSIUM 875; 125 MG/1; MG/1
1 TABLET, FILM COATED ORAL 2 TIMES DAILY
Qty: 14 TABLET | Refills: 0 | Status: SHIPPED | OUTPATIENT
Start: 2022-12-09 | End: 2022-12-16

## 2022-12-09 RX ORDER — PANTOPRAZOLE SODIUM 40 MG/1
40 TABLET, DELAYED RELEASE ORAL DAILY
COMMUNITY

## 2022-12-09 NOTE — PROGRESS NOTES
68 yo female here for perirectal abscess  Onset 1 week ago  Saw PCP who ordered labs resulting in elevated white count (unkonwn value today) and suggested possible early perirectal abscess. Advised soaks. Patient performed and has since noted drainage from area  Area is still painful and sore  Denies fever or chills  ROS + for diarrhea; states PCP has stool cultures pending and has checked labs.            Past Medical History:   Diagnosis Date    Coagulation disorder ([address]     takes Eliquis    Depression     GERD (gastroesophageal reflux disease)     H/O hammer toe correction     Nausea & vomiting     Osteoarthritis     knee, thumb    Osteoporosis     Psychiatric disorder     anxiety, seasonal affective disorder    PUD (peptic ulcer disease) 2015    required blood transfusion    SVT (supraventricular tachycardia) (HCC)     Afib takes Eliquis        Past Surgical History:   Procedure Laterality Date    HX BREAST BIOPSY Right     pt was 21or 19years old negative    HX CATARACT REMOVAL      bilateral    HX GYN      HX HEENT  1979    L pupil does not react; h/o bilateral retina surgery/detachment    HX HYSTERECTOMY      HX KNEE REPLACEMENT Left     HX ORTHOPAEDIC Bilateral     left thumb surgery due to OA    HX ORTHOPAEDIC Right     right hammer toe repair with screw    HX RETINAL DETACHMENT REPAIR      bilateral     WA CARDIAC SURG PROCEDURE UNLIST  2017    Cardiac Ablation    WA DILATE ESOPHAGUS  6/27/2018         WA EGD TRANSORAL CONTROL BLEEDING ANY METHOD  10/2/2013         UPPER GI ENDOSCOPY,BIOPSY  6/27/2018              Family History   Problem Relation Age of Onset    Heart Disease Mother     Kidney Disease Mother     Heart Disease Father     Hypertension Father         Social History     Socioeconomic History    Marital status:      Spouse name: Not on file    Number of children: Not on file    Years of education: Not on file    Highest education level: Not on file   Occupational History    Not on file   Tobacco Use    Smoking status: Never    Smokeless tobacco: Never   Vaping Use    Vaping Use: Never used   Substance and Sexual Activity    Alcohol use: Yes     Alcohol/week: 5.0 standard drinks     Types: 5 Glasses of wine per week     Comment: 5 per week    Drug use: Yes     Types: Prescription, OTC    Sexual activity: Yes     Partners: Male     Birth control/protection: Surgical   Other Topics Concern    Not on file   Social History Narrative    Not on file     Social Determinants of Health     Financial Resource Strain: Not on file   Food Insecurity: Not on file   Transportation Needs: Not on file   Physical Activity: Not on file   Stress: Not on file   Social Connections: Not on file   Intimate Partner Violence: Not on file   Housing Stability: Not on file                ALLERGIES: Latex, Prolia [denosumab], Adhesive tape-silicones, Erythromycin, Neomycin, Nickel, and Nsaids (non-steroidal anti-inflammatory drug)    Review of Systems   All other systems reviewed and are negative. Vitals:    12/09/22 1336   BP: 114/70   Pulse: (!) 53   Resp: 16   Temp: 98.3 °F (36.8 °C)   SpO2: 100%   Weight: 158 lb (71.7 kg)   Height: 5' 3\" (1.6 m)       Physical Exam  Genitourinary:          MDM     Differential Diagnosis; Clinical Impression; Plan:       CLINICAL IMPRESSION:  (K61.1) Perirectal abscess  (primary encounter diagnosis)    Orders Placed This Encounter      amoxicillin-clavulanate (Augmentin) 875-125 mg per tablet          Sig: Take 1 Tablet by mouth two (2) times a day for 7 days.           Dispense:  14 Tablet          Refill:  0      Plan:  Exam consistent with perirectal abscess  Start Augmentin  Will refer to colorectal specialist per patient request  Continue warm soaks  Immediate follow up warranted for any new, worsening or changes        Procedures

## 2023-01-12 ENCOUNTER — ANESTHESIA EVENT (OUTPATIENT)
Dept: ENDOSCOPY | Age: 68
End: 2023-01-12
Payer: MEDICARE

## 2023-01-12 ENCOUNTER — ANESTHESIA (OUTPATIENT)
Dept: ENDOSCOPY | Age: 68
End: 2023-01-12
Payer: MEDICARE

## 2023-01-12 ENCOUNTER — HOSPITAL ENCOUNTER (OUTPATIENT)
Age: 68
Setting detail: OUTPATIENT SURGERY
Discharge: HOME | End: 2023-01-12
Attending: STUDENT IN AN ORGANIZED HEALTH CARE EDUCATION/TRAINING PROGRAM | Admitting: STUDENT IN AN ORGANIZED HEALTH CARE EDUCATION/TRAINING PROGRAM
Payer: MEDICARE

## 2023-01-12 VITALS
SYSTOLIC BLOOD PRESSURE: 113 MMHG | BODY MASS INDEX: 27.64 KG/M2 | WEIGHT: 156 LBS | OXYGEN SATURATION: 95 % | RESPIRATION RATE: 16 BRPM | HEIGHT: 63 IN | HEART RATE: 51 BPM | TEMPERATURE: 97.4 F | DIASTOLIC BLOOD PRESSURE: 68 MMHG

## 2023-01-12 PROCEDURE — 74011250636 HC RX REV CODE- 250/636: Performed by: NURSE ANESTHETIST, CERTIFIED REGISTERED

## 2023-01-12 PROCEDURE — 76060000033 HC ANESTHESIA 1 TO 1.5 HR: Performed by: STUDENT IN AN ORGANIZED HEALTH CARE EDUCATION/TRAINING PROGRAM

## 2023-01-12 PROCEDURE — 74011250636 HC RX REV CODE- 250/636: Performed by: STUDENT IN AN ORGANIZED HEALTH CARE EDUCATION/TRAINING PROGRAM

## 2023-01-12 PROCEDURE — 77030021593 HC FCPS BIOP ENDOSC BSC -A: Performed by: STUDENT IN AN ORGANIZED HEALTH CARE EDUCATION/TRAINING PROGRAM

## 2023-01-12 PROCEDURE — 2709999900 HC NON-CHARGEABLE SUPPLY: Performed by: STUDENT IN AN ORGANIZED HEALTH CARE EDUCATION/TRAINING PROGRAM

## 2023-01-12 PROCEDURE — 74011000250 HC RX REV CODE- 250: Performed by: NURSE ANESTHETIST, CERTIFIED REGISTERED

## 2023-01-12 PROCEDURE — 76040000008: Performed by: STUDENT IN AN ORGANIZED HEALTH CARE EDUCATION/TRAINING PROGRAM

## 2023-01-12 PROCEDURE — 88305 TISSUE EXAM BY PATHOLOGIST: CPT

## 2023-01-12 RX ORDER — PROPOFOL 10 MG/ML
INJECTION, EMULSION INTRAVENOUS AS NEEDED
Status: DISCONTINUED | OUTPATIENT
Start: 2023-01-12 | End: 2023-01-12 | Stop reason: HOSPADM

## 2023-01-12 RX ORDER — EPINEPHRINE 0.1 MG/ML
1 INJECTION INTRACARDIAC; INTRAVENOUS
Status: DISCONTINUED | OUTPATIENT
Start: 2023-01-12 | End: 2023-01-12 | Stop reason: HOSPADM

## 2023-01-12 RX ORDER — EPHEDRINE SULFATE/0.9% NACL/PF 50 MG/5 ML
SYRINGE (ML) INTRAVENOUS AS NEEDED
Status: DISCONTINUED | OUTPATIENT
Start: 2023-01-12 | End: 2023-01-12 | Stop reason: HOSPADM

## 2023-01-12 RX ORDER — SODIUM CHLORIDE 0.9 % (FLUSH) 0.9 %
5-40 SYRINGE (ML) INJECTION AS NEEDED
Status: DISCONTINUED | OUTPATIENT
Start: 2023-01-12 | End: 2023-01-12 | Stop reason: HOSPADM

## 2023-01-12 RX ORDER — NALOXONE HYDROCHLORIDE 0.4 MG/ML
0.4 INJECTION, SOLUTION INTRAMUSCULAR; INTRAVENOUS; SUBCUTANEOUS
Status: DISCONTINUED | OUTPATIENT
Start: 2023-01-12 | End: 2023-01-12 | Stop reason: HOSPADM

## 2023-01-12 RX ORDER — ATROPINE SULFATE 0.1 MG/ML
0.5 INJECTION INTRAVENOUS
Status: DISCONTINUED | OUTPATIENT
Start: 2023-01-12 | End: 2023-01-12 | Stop reason: HOSPADM

## 2023-01-12 RX ORDER — SODIUM CHLORIDE 0.9 % (FLUSH) 0.9 %
5-40 SYRINGE (ML) INJECTION EVERY 8 HOURS
Status: DISCONTINUED | OUTPATIENT
Start: 2023-01-12 | End: 2023-01-12 | Stop reason: HOSPADM

## 2023-01-12 RX ORDER — FLUMAZENIL 0.1 MG/ML
0.2 INJECTION INTRAVENOUS
Status: DISCONTINUED | OUTPATIENT
Start: 2023-01-12 | End: 2023-01-12 | Stop reason: HOSPADM

## 2023-01-12 RX ORDER — LIDOCAINE HYDROCHLORIDE 20 MG/ML
INJECTION, SOLUTION EPIDURAL; INFILTRATION; INTRACAUDAL; PERINEURAL AS NEEDED
Status: DISCONTINUED | OUTPATIENT
Start: 2023-01-12 | End: 2023-01-12 | Stop reason: HOSPADM

## 2023-01-12 RX ORDER — DEXTROMETHORPHAN/PSEUDOEPHED 2.5-7.5/.8
1.2 DROPS ORAL
Status: DISCONTINUED | OUTPATIENT
Start: 2023-01-12 | End: 2023-01-12 | Stop reason: HOSPADM

## 2023-01-12 RX ORDER — SODIUM CHLORIDE 9 MG/ML
50 INJECTION, SOLUTION INTRAVENOUS CONTINUOUS
Status: DISCONTINUED | OUTPATIENT
Start: 2023-01-12 | End: 2023-01-12 | Stop reason: HOSPADM

## 2023-01-12 RX ADMIN — PROPOFOL 50 MG: 10 INJECTION, EMULSION INTRAVENOUS at 09:31

## 2023-01-12 RX ADMIN — PROPOFOL 50 MG: 10 INJECTION, EMULSION INTRAVENOUS at 09:41

## 2023-01-12 RX ADMIN — PROPOFOL 50 MG: 10 INJECTION, EMULSION INTRAVENOUS at 10:15

## 2023-01-12 RX ADMIN — PROPOFOL 50 MG: 10 INJECTION, EMULSION INTRAVENOUS at 09:46

## 2023-01-12 RX ADMIN — PROPOFOL 50 MG: 10 INJECTION, EMULSION INTRAVENOUS at 09:59

## 2023-01-12 RX ADMIN — PROPOFOL 50 MG: 10 INJECTION, EMULSION INTRAVENOUS at 10:07

## 2023-01-12 RX ADMIN — SODIUM CHLORIDE 50 ML/HR: 9 INJECTION, SOLUTION INTRAVENOUS at 08:35

## 2023-01-12 RX ADMIN — Medication 10 MG: at 09:47

## 2023-01-12 RX ADMIN — Medication 10 MG: at 09:57

## 2023-01-12 RX ADMIN — PROPOFOL 50 MG: 10 INJECTION, EMULSION INTRAVENOUS at 09:55

## 2023-01-12 RX ADMIN — PROPOFOL 50 MG: 10 INJECTION, EMULSION INTRAVENOUS at 10:11

## 2023-01-12 RX ADMIN — LIDOCAINE HYDROCHLORIDE 40 MG: 20 INJECTION, SOLUTION EPIDURAL; INFILTRATION; INTRACAUDAL; PERINEURAL at 09:31

## 2023-01-12 RX ADMIN — PROPOFOL 50 MG: 10 INJECTION, EMULSION INTRAVENOUS at 10:03

## 2023-01-12 RX ADMIN — PROPOFOL 50 MG: 10 INJECTION, EMULSION INTRAVENOUS at 09:51

## 2023-01-12 RX ADMIN — PROPOFOL 50 MG: 10 INJECTION, EMULSION INTRAVENOUS at 09:35

## 2023-01-12 NOTE — ANESTHESIA POSTPROCEDURE EVALUATION
Procedure(s):  COLONOSCOPY  COLON BIOPSY. MAC    Anesthesia Post Evaluation      Multimodal analgesia: multimodal analgesia used between 6 hours prior to anesthesia start to PACU discharge  Patient location during evaluation: bedside  Patient participation: complete - patient participated  Level of consciousness: awake  Pain management: adequate  Airway patency: patent  Anesthetic complications: no  Cardiovascular status: acceptable  Respiratory status: acceptable  Hydration status: acceptable  Post anesthesia nausea and vomiting:  controlled  Final Post Anesthesia Temperature Assessment:  Normothermia (36.0-37.5 degrees C)      INITIAL Post-op Vital signs:   Vitals Value Taken Time   /68 01/12/23 1042   Temp 36.3 °C (97.4 °F) 01/12/23 1030   Pulse 53 01/12/23 1045   Resp 19 01/12/23 1045   SpO2 98 % 01/12/23 1045   Vitals shown include unvalidated device data.

## 2023-01-12 NOTE — H&P
Colonoscopy H&P    History:  Preop office visit H&P reviewed, no changes. 77 y.o. female here for colonoscopy for diarrhea.     Past Medical History:   Diagnosis Date    Coagulation disorder ([address]     takes Eliquis    Depression     GERD (gastroesophageal reflux disease)     H/O hammer toe correction     Nausea & vomiting     Osteoarthritis     knee, thumb    Osteoporosis     Psychiatric disorder     anxiety, seasonal affective disorder    PUD (peptic ulcer disease) 2015    required blood transfusion    SVT (supraventricular tachycardia) (HCC)     Afib takes Eliquis        Past Surgical History:   Procedure Laterality Date    HX BREAST BIOPSY Right     pt was 21or 19years old negative    HX CATARACT REMOVAL      bilateral    HX GYN      HX HEENT  1979    L pupil does not react; h/o bilateral retina surgery/detachment    HX HYSTERECTOMY      HX KNEE REPLACEMENT Bilateral     HX ORTHOPAEDIC Bilateral     left thumb surgery due to OA    HX ORTHOPAEDIC Right     right hammer toe repair with screw    HX RETINAL DETACHMENT REPAIR      bilateral     VT DILATION ESOPH UNGUIDED SOUND/BOUGIE 1/MULT PASS  06/27/2018         VT EGD TRANSORAL CONTROL BLEEDING ANY METHOD  10/02/2013         VT UNLISTED PROCEDURE CARDIAC SURGERY  2017    Cardiac Ablation    UPPER GI ENDOSCOPY,BIOPSY  06/27/2018            Current Outpatient Medications   Medication Instructions    acetaminophen (TYLENOL EXTRA STRENGTH) 500 mg, Oral, AS NEEDED    alendronate (FOSAMAX) 70 mg tablet Oral, EVERY 7 DAYS    ALPRAZolam (XANAX) 0.5 mg tablet Oral, AS NEEDED    apixaban (ELIQUIS PO) 5 mg, Oral, 2 TIMES DAILY    Calcium-Cholecalciferol, D3, 600 mg-10 mcg (400 unit) cap 2 Capsules, DAILY    conjugated estrogens (PREMARIN) 0.5 g, Vaginal, DAILY    fish oil-omega-3 fatty acids 340-1,000 mg capsule 1 Capsule, Oral, DAILY    flecainide (TAMBOCOR) 50 mg, Oral, 2 TIMES DAILY, 25mg in AM and 34RO in PM    folic acid/multivit-min/lutein (SPECTRAVITE ADULT 50 PLUS,LUT, PO) 1 Tablet, Oral, DAILY    mirabegron ER (MYRBETRIQ) 50 mg, Oral, DAILY    pantoprazole (PROTONIX) 40 mg, Oral, AS NEEDED    primidone (MYSOLINE) 50 mg tablet TAKE 1 TABLET BY MOUTH NIGHTLY. FOR ESSENTIAL TREMOR    propranoloL (INDERAL) 10 mg, Oral, 3 TIMES DAILY    sertraline (ZOLOFT) 100 mg, Oral, EVERY EVENING    zolpidem (AMBIEN) 2.5 mg, Oral, BEDTIME PRN        ROS:  Negative except as noted above    Exam:  No data found. Constitutional:       Appearance: Normal appearance. HENT:      Head: Normocephalic and atraumatic. Eyes:      Extraocular Movements: Extraocular movements intact. Pupils: Pupils are equal, round, and reactive to light. Cardiovascular:      Rate and Rhythm: Normal rate and regular rhythm. Pulmonary:      Effort: Pulmonary effort is normal.   Abdominal:      General: Abdomen is flat. Palpations: Abdomen is soft. Musculoskeletal:         General: Normal range of motion. Cervical back: Normal range of motion. Skin:     General: Skin is warm and dry. Neurological:      General: No focal deficit present. Mental Status: She is alert. Psychiatric:         Mood and Affect: Mood normal.         Behavior: Behavior normal.     Assessment:  77 y.o. female here for colonoscopy for diarrhea. Plan:  Proceed with colonoscopy. Risks and benefits of colonoscopy discussed. Risks discussed include bleeding, perforation, missed adenoma, adverse medication reaction, etc. They understand and wish to proceed.

## 2023-01-12 NOTE — PERIOP NOTES
Endoscopy Case End Note:    5703:  Procedure scope was pre-cleaned, per protocol, at bedside by LT.      1025:  Report received from anesthesia - [address]. See anesthesia flowsheet for intra-procedure vital signs and events. 1025:  Glasses returned to patient.

## 2023-01-12 NOTE — DISCHARGE INSTRUCTIONS
Sandra Sun  [id number]  1955    COLON DISCHARGE INSTRUCTIONS  Discomfort:  Redness at IV site- apply warm compress to area; if redness or soreness persist- contact your physician  There may be a slight amount of blood passed from the rectum  Gaseous discomfort- walking, belching will help relieve any discomfort  You may not operate a vehicle for 12 hours  You may not engage in an occupation involving machinery or appliances for rest of today  You may not drink alcoholic beverages for at least 12 hours  Avoid making any critical decisions for at least 24 hour  DIET:   High fiber diet. - however -  remember your colon is empty and a heavy meal will produce gas. Avoid these foods:  vegetables, fried / greasy foods, carbonated drinks for today         ACTIVITY:  You may resume your normal daily activities it is recommended that you spend the remainder of the day resting -  avoid any strenuous activity. CALL M.D. ANY SIGN OF:   Increasing pain, nausea, vomiting  Abdominal distension (swelling)  New increased bleeding (oral or rectal)  Fever (chills)  Pain in chest area  Bloody discharge from nose or mouth  Shortness of breath     Follow-up Instructions:   Call Meghan Waller MD if any questions or problems. Telephone # [phone]  Biopsy results will be available in  7 to10 days  Should have a repeat colonoscopy in 10 years. COLONOSCOPY FINDINGS:  Your colonoscopy showed: normal appearing colon and terminal ileum.         Patient Education on Sedation / Analgesia Administered for Procedure      For 24 hours after general anesthesia or intravenous analgesia / sedation:  Have someone responsible help you with your care  Limit your activities  Do not drive and operate hazardous machinery  Do not make important personal, legal or business decisions  Do not drink alcoholic beverages  If you have not urinated within 8 hours after discharge, please contact your physician  Resume your medications unless otherwise instructed    For 24 hours after general anesthesia or intravenous analgesia / sedation  you may experience:  Drowsiness, dizziness, sleepiness, or confusion  Difficulty remembering or delayed reaction times  Difficulty with your balance, especially while walking, move slowly and carefully, do not make sudden position changes  Difficulty focusing or blurred vision    You may not be aware of slight changes in your behavior and/or your reaction time because of the medication used during and after your procedure.     Report the following to your physician:  Excessive pain, swelling, redness or odor of or around the surgical area  Temperature over 100.5  Nausea and vomiting lasting longer than 4 hours or if unable to take medications  Any signs of decreased circulation or nerve impairment to extremity: change in color, persistent numbness, tingling, coldness or increase pain  Any questions or concerns    IF YOU REPORT TO AN EMERGENCY ROOM, DOCTOR'S OFFICE OR HOSPITAL WITHIN 24 HOURS AFTER YOUR PROCEDURE, BRING THIS SHEET AND YOUR AFTER VISIT SUMMARY WITH YOU AND GIVE IT TO THE PHYSICIAN OR NURSE ATTENDING YOU.

## 2023-01-12 NOTE — ROUTINE PROCESS
[place]  1955  [id number]    Situation:  Verbal report received from: Abner Carty RN  Procedure: Procedure(s):  COLONOSCOPY  COLON BIOPSY    Background:    Preoperative diagnosis: Diarrhea, unspecified type [R19.7]  Postoperative diagnosis: Sigmoid colon diverticulosis     :  Dr. Duong Mc  Assistant(s): Endoscopy Technician-1: Staci Duncan  Endoscopy RN-1: Tita Godoy RN    Specimens:   ID Type Source Tests Collected by Time Destination   1 : Sigmoid colon random biopsies  Preservative Sigmoid  Lianne Bowie MD 1/12/2023 1017 Pathology   2 : Rectal random biopsies  Preservative Rectum  Lianne Bowie MD 1/12/2023 1018 Pathology     H. Pylori  no    Assessment:  Intra-procedure medications     Anesthesia gave intra-procedure sedation and medications, see anesthesia flow sheet yes    Intravenous fluids: NS@ KVO     Vital signs stable   yes    Abdominal assessment: round and soft   yes    Recommendation:  Discharge patient per MD order   yes.     Family or Erskin Barter,   Permission to share finding with family or friend yes

## 2023-01-12 NOTE — PROCEDURES
Colonoscopy Procedure Note    Indications:  diarrhea    Anesthesia/Sedation: MAC anesthesia Propofol    Pre-Procedure Exam:  Airway: clear   Heart: normal S1and S2    Lungs: clear bilateral  Abdomen: soft, nontender, bowel sounds present and normal in all quadrants   Mental Status: awake, alert, and oriented to person, place, and time      Procedure in Detail:  Informed consent was obtained for the procedure, including sedation. Risks of perforation, hemorrhage, adverse drug reaction, and aspiration were discussed. The patient was placed in the left lateral decubitus position. Based on the pre-procedure assessment, including review of the patient's medical history, medications, allergies, and review of systems, she had been deemed to be an appropriate candidate for moderate sedation; she was therefore sedated with the medications listed above. The patient was monitored continuously with ECG tracing, pulse oximetry, blood pressure monitoring, and direct observations. A rectal examination was performed. The [id number] was inserted into the rectum and advanced under direct vision to the cecum, which was identified by the ileocecal valve and appendiceal orifice. The quality of the colonic preparation was excellent. A careful inspection was made as the colonoscope was withdrawn; findings and interventions are described below. Appropriate photodocumentation was obtained. Findings:   ANUS: Anal exam reveals no masses or hemorrhoids, sphincter tone is normal.   RECTUM: Rectal exam reveals no masses or hemorrhoids. Tight/angulated rectosigmoid junction. Random biopsied taken with cold forceps  SIGMOID COLON: There were scattered sigmoid colon diverticula. Random biopsied taken with cold forceps  DESCENDING COLON: The mucosa is normal with good vascular pattern and without ulcers, diverticula, and polyps.    SPLENIC FLEXURE: The splenic flexure is normal.   TRANSVERSE COLON: The mucosa is normal with good vascular pattern and without ulcers, diverticula, and polyps. HEPATIC FLEXURE: The hepatic flexure is normal.   ASCENDING COLON: The mucosa is normal with good vascular pattern and without ulcers, diverticula, and polyps. CECUM: The appendiceal orifice appears normal. The ileocecal valve appears normal.   TERMINAL ILEUM: The terminal ileum was entered and was normal.     Specimens: Specimens were collected and sent to pathology. EBL: Minimal    Complications: None; patient tolerated the procedure well. Attending Attestation: I performed the procedure. Recommendations:   - Follow up with me. Bactrim Counseling:  I discussed with the patient the risks of sulfa antibiotics including but not limited to GI upset, allergic reaction, drug rash, diarrhea, dizziness, photosensitivity, and yeast infections.  Rarely, more serious reactions can occur including but not limited to aplastic anemia, agranulocytosis, methemoglobinemia, blood dyscrasias, liver or kidney failure, lung infiltrates or desquamative/blistering drug rashes.

## 2023-01-12 NOTE — ANESTHESIA PREPROCEDURE EVALUATION
Relevant Problems   No relevant active problems   Anesthetic History     PONV          Review of Systems / Medical History  Patient summary reviewed, nursing notes reviewed and pertinent labs reviewed    Pulmonary  Within defined limits                 Neuro/Psych         Psychiatric history     Cardiovascular            Dysrhythmias : SVT and atrial fibrillation      Exercise tolerance: >4 METS     GI/Hepatic/Renal     GERD      PUD     Endo/Other        Arthritis     Other Findings              Physical Exam    Airway  Mallampati: II  TM Distance: 4 - 6 cm  Neck ROM: normal range of motion   Mouth opening: Normal     Cardiovascular  Regular rate and rhythm,  S1 and S2 normal,  no murmur, click, rub, or gallop             Dental  No notable dental hx       Pulmonary  Breath sounds clear to auscultation               Abdominal  GI exam deferred       Other Findings            Anesthetic Plan    ASA: 2  Anesthesia type: total IV anesthesia and MAC          Induction: Intravenous  Anesthetic plan and risks discussed with: Patient          Anesthetic History     PONV          Review of Systems / Medical History  Patient summary reviewed, nursing notes reviewed and pertinent labs reviewed    Pulmonary  Within defined limits                 Neuro/Psych         Psychiatric history     Cardiovascular            Dysrhythmias : SVT and atrial fibrillation      Exercise tolerance: >4 METS     GI/Hepatic/Renal     GERD      PUD     Endo/Other        Arthritis     Other Findings              Physical Exam    Airway  Mallampati: II  TM Distance: 4 - 6 cm  Neck ROM: normal range of motion   Mouth opening: Normal     Cardiovascular  Regular rate and rhythm,  S1 and S2 normal,  no murmur, click, rub, or gallop             Dental  No notable dental hx       Pulmonary  Breath sounds clear to auscultation               Abdominal  GI exam deferred       Other Findings            Anesthetic Plan    ASA: 2  Anesthesia type: total IV anesthesia and MAC          Induction: Intravenous  Anesthetic plan and risks discussed with: Patient          Anesthetic History     PONV          Review of Systems / Medical History  Patient summary reviewed, nursing notes reviewed and pertinent labs reviewed    Pulmonary  Within defined limits                 Neuro/Psych         Psychiatric history     Cardiovascular            Dysrhythmias       Exercise tolerance: >4 METS     GI/Hepatic/Renal     GERD           Endo/Other        Arthritis     Other Findings              Physical Exam    Airway  Mallampati: II  TM Distance: 4 - 6 cm  Neck ROM: normal range of motion   Mouth opening: Normal     Cardiovascular  Regular rate and rhythm,  S1 and S2 normal,  no murmur, click, rub, or gallop             Dental  No notable dental hx       Pulmonary  Breath sounds clear to auscultation               Abdominal  GI exam deferred       Other Findings            Anesthetic Plan    ASA: 3  Anesthesia type: general    Monitoring Plan: BIS      Induction: Intravenous  Anesthetic plan and risks discussed with: Patient              Anesthetic History     PONV          Review of Systems / Medical History  Patient summary reviewed, nursing notes reviewed and pertinent labs reviewed    Pulmonary  Within defined limits                 Neuro/Psych         Psychiatric history and dementia    Comments: Depression  Anxiety  Seasonal Affective Disorder Cardiovascular            Dysrhythmias : atrial fibrillation and SVT      Exercise tolerance: >4 METS  Comments: S/P A-fib Ablation (8/31/17)  S/P A-Flutter Ablation (6/1/17)    TTE (4/14/17):   Mild MR, mild TI, EF=60-65%    Taking Apixaban (Eliquis)   GI/Hepatic/Renal     GERD      PUD     Endo/Other        Arthritis    Comments: Right Knee OA  S/P Left TKR Other Findings   Comments: S/P Right TKR today (11/11/19) - patient with nickel allergy - prosthesis replacement to be replaced    Osteoporosis           Physical Exam    Airway  Mallampati: II  TM Distance: 4 - 6 cm  Neck ROM: normal range of motion   Mouth opening: Normal     Cardiovascular  Regular rate and rhythm,  S1 and S2 normal,  no murmur, click, rub, or gallop             Dental    Dentition: Caps/crowns     Pulmonary  Breath sounds clear to auscultation               Abdominal  GI exam deferred       Other Findings            Anesthetic Plan    ASA: 3  Anesthesia type: MAC          Induction: Intravenous  Anesthetic plan and risks discussed with: Patient

## 2023-04-06 ENCOUNTER — TELEPHONE (OUTPATIENT)
Dept: NEUROLOGY | Age: 68
End: 2023-04-06

## 2023-04-06 NOTE — TELEPHONE ENCOUNTER
Spoke with patient. Verified patient with two patient identifiers. States her PCP is filling her Primidone and they have all the info they need. Patient verbalized understanding.

## 2023-04-12 RX ORDER — PRIMIDONE 50 MG/1
50 TABLET ORAL
COMMUNITY

## 2023-04-12 RX ORDER — PRIMIDONE 50 MG/1
TABLET ORAL
Qty: 90 TABLET | Refills: 1 | Status: CANCELLED | OUTPATIENT
Start: 2023-04-12

## 2023-04-12 RX ORDER — PRIMIDONE 50 MG/1
50 TABLET ORAL
Status: CANCELLED | OUTPATIENT
Start: 2023-04-12

## 2023-07-25 ENCOUNTER — OFFICE VISIT (OUTPATIENT)
Age: 68
End: 2023-07-25
Payer: MEDICARE

## 2023-07-25 VITALS
DIASTOLIC BLOOD PRESSURE: 70 MMHG | WEIGHT: 155.8 LBS | HEART RATE: 53 BPM | HEIGHT: 63 IN | OXYGEN SATURATION: 100 % | RESPIRATION RATE: 18 BRPM | BODY MASS INDEX: 27.61 KG/M2 | SYSTOLIC BLOOD PRESSURE: 120 MMHG

## 2023-07-25 DIAGNOSIS — G25.0 BENIGN ESSENTIAL TREMOR SYNDROME: Primary | ICD-10-CM

## 2023-07-25 PROCEDURE — G8427 DOCREV CUR MEDS BY ELIG CLIN: HCPCS | Performed by: PSYCHIATRY & NEUROLOGY

## 2023-07-25 PROCEDURE — 1123F ACP DISCUSS/DSCN MKR DOCD: CPT | Performed by: PSYCHIATRY & NEUROLOGY

## 2023-07-25 PROCEDURE — 99214 OFFICE O/P EST MOD 30 MIN: CPT | Performed by: PSYCHIATRY & NEUROLOGY

## 2023-07-25 PROCEDURE — G8419 CALC BMI OUT NRM PARAM NOF/U: HCPCS | Performed by: PSYCHIATRY & NEUROLOGY

## 2023-07-25 PROCEDURE — 1036F TOBACCO NON-USER: CPT | Performed by: PSYCHIATRY & NEUROLOGY

## 2023-07-25 PROCEDURE — 1090F PRES/ABSN URINE INCON ASSESS: CPT | Performed by: PSYCHIATRY & NEUROLOGY

## 2023-07-25 PROCEDURE — 3017F COLORECTAL CA SCREEN DOC REV: CPT | Performed by: PSYCHIATRY & NEUROLOGY

## 2023-07-25 PROCEDURE — G8399 PT W/DXA RESULTS DOCUMENT: HCPCS | Performed by: PSYCHIATRY & NEUROLOGY

## 2023-07-25 RX ORDER — PNV NO.95/FERROUS FUM/FOLIC AC 28MG-0.8MG
1 TABLET ORAL DAILY
COMMUNITY

## 2023-07-25 ASSESSMENT — PATIENT HEALTH QUESTIONNAIRE - PHQ9
1. LITTLE INTEREST OR PLEASURE IN DOING THINGS: 0
SUM OF ALL RESPONSES TO PHQ QUESTIONS 1-9: 0
SUM OF ALL RESPONSES TO PHQ QUESTIONS 1-9: 0
2. FEELING DOWN, DEPRESSED OR HOPELESS: 0
SUM OF ALL RESPONSES TO PHQ QUESTIONS 1-9: 0
SUM OF ALL RESPONSES TO PHQ QUESTIONS 1-9: 0
SUM OF ALL RESPONSES TO PHQ9 QUESTIONS 1 & 2: 0

## 2023-07-25 NOTE — PROGRESS NOTES
Consult  REFERRED BY:  Laz Dee MD    CHIEF COMPLAINT: Tremors      Subjective:     Frnaca Hernandez is a 77 y.o. right-handed  female we are seeing as a new patient, at the request of Dr. Sushila Schuster, for a history of 5 or 6 years of tremor when she tries to do things like feed herself with a fork, and she has been diagnosed with benign essential tremor and has a family history that her father had the same tremor and she finds that she can take one half a tablet of primidone 50 mg at nighttime without side effects, which helps the tremor, and she takes Inderal 10 mg 3 times a day with some improvement also, and that combination seems to be well-tolerated without dropping her blood pressure or slowing her heart rate, but higher doses of Inderal did in the past, and she does not have the confusion and sedation she has with the full dose Mysoline either on half the dose. The patient does not appear to have had imaging of the brain, but apparently has had her thyroid checked by her PCP. She is on Eliquis, but apparently her cardiologist has cleared her to take the primidone with the Eliquis. She feels stable, and does not feel that she needs to change her medication at this time and would like to just continue the current dose. She has had no other new medical or neurological problem since last seen 1 year ago by her previous neurologist.  She has degenerative arthritis and generalized anxiety disorder and depression and GERD and peptic ulcer disease and A-fib for which she takes Eliquis.     Past Medical History:   Diagnosis Date    Coagulation disorder ([address])     takes Eliquis    Depression     GERD (gastroesophageal reflux disease)     H/O hammer toe correction     Nausea & vomiting     Osteoarthritis     knee, thumb    Osteoporosis     Psychiatric disorder     anxiety, seasonal affective disorder    PUD (peptic ulcer disease) 2015    required blood transfusion    SVT (supraventricular tachycardia)

## 2023-08-22 ENCOUNTER — TRANSCRIBE ORDERS (OUTPATIENT)
Facility: HOSPITAL | Age: 68
End: 2023-08-22

## 2023-08-22 DIAGNOSIS — Z12.31 SCREENING MAMMOGRAM FOR HIGH-RISK PATIENT: Primary | ICD-10-CM

## 2023-10-08 ENCOUNTER — OFFICE VISIT (OUTPATIENT)
Age: 68
End: 2023-10-08

## 2023-10-08 VITALS
WEIGHT: 165 LBS | OXYGEN SATURATION: 95 % | SYSTOLIC BLOOD PRESSURE: 110 MMHG | TEMPERATURE: 97.9 F | RESPIRATION RATE: 15 BRPM | HEART RATE: 93 BPM | BODY MASS INDEX: 29.23 KG/M2 | HEIGHT: 63 IN | DIASTOLIC BLOOD PRESSURE: 79 MMHG

## 2023-10-08 DIAGNOSIS — J06.9 UPPER RESPIRATORY TRACT INFECTION, UNSPECIFIED TYPE: ICD-10-CM

## 2023-10-08 DIAGNOSIS — U07.1 COVID-19: Primary | ICD-10-CM

## 2023-10-08 LAB
Lab: ABNORMAL
QC PASS/FAIL: ABNORMAL
SARS-COV-2 RDRP RESP QL NAA+PROBE: POSITIVE

## 2023-10-08 ASSESSMENT — ENCOUNTER SYMPTOMS
SINUS PRESSURE: 1
SORE THROAT: 0
RHINORRHEA: 1
COUGH: 1
CHEST TIGHTNESS: 0
SHORTNESS OF BREATH: 0

## 2023-10-23 ENCOUNTER — HOSPITAL ENCOUNTER (OUTPATIENT)
Facility: HOSPITAL | Age: 68
Discharge: HOME | End: 2023-10-26
Payer: MEDICARE

## 2023-10-23 VITALS — HEIGHT: 63 IN | WEIGHT: 160 LBS | BODY MASS INDEX: 28.35 KG/M2

## 2023-10-23 DIAGNOSIS — Z12.31 SCREENING MAMMOGRAM FOR HIGH-RISK PATIENT: ICD-10-CM

## 2023-10-23 PROCEDURE — 77063 BREAST TOMOSYNTHESIS BI: CPT

## 2023-11-19 NOTE — DISCHARGE INSTRUCTIONS
Discharge Instructions: How to [address]  Surgery: Total Knee Replacement  Surgeon:   Janae Tate MD  Surgery Date:  11/11/2019     To relieve pain:   Use ice/gel packs.  Put the ice pack directly over the wound, or anywhere you are hurting or swollen.  To control pain and swelling, keep ice on regularly, especially after physical activity.  The packs should stay cold for 3-4 hours. When it is not cold anymore, rotate with the packs in the freezer.  Elevate your leg. This will also keep swelling down.  Rest for at least 20 minutes between activity or exercises.  To keep track of your pain medications, write down what you take and when you take it.  The last dose of pain medication you got in the hospital was:       Medication    Dose    Date & Time           Choose your medications based on the pain scale below:     To keep your pain under control, take Tylenol every 6 hours for 14 days - even if you feel like you dont need it.  For mild to moderate pain (4-6 on pain scale), take one pain pill every 3-4 hours as needed.  For severe pain (7-10 on pain scale), take two pain pills every 3-4 hours as needed.  To prevent nausea, take your pain medications with food. Pain Scale                 As your pain lessens:     Slowly start taking less pain medication. You may do this by waiting longer between doses or by taking smaller doses.  Stop using the pain medications as soon as you no longer need it, usually in 2-3 weeks.  Eliquis    To prevent blood clots, you will need to take your Eliquis as prescribed             To prevent stomach upset or bleeding:   Do not take non-steroidal anti-inflammatory medications (Ibuprofen, Advil, Motrin, Naproxen, etc.)    Take Protonix (home medication) while you are taking a blood thinner.                You will have some swelling, warmth, and bruising around the knee and up and down the leg after surgery. This will may get worse in the first few days you are home and will slowly get better over the next few weeks. OPSITE DRESSING INSTRUCTIONS                  PRINEO DRESSING INSTRUCTIONS      Prineo is a type of skin glue and mesh that is keeping your wound together.  Leave this covering alone. Do not peel it off.  Do not apply oils or lotions over it.  You may shower with this dressing but do not soak it. Gently pat your wound dry when you get out of the shower.  DO NOTs:   Do not rub your surgical wound   Do not put lotion or oils on your wound.  Do not take a tub bath or go swimming until your doctor says it is ok.  You may shower with this dressing over your wound.  After showering pat the dressing dry.  If you have staples a home health nurse will remove them in about 10 days.  To increase and promote healing:   Stop Smoking (or at least cut back on       Smoking).  Eat a well-balanced diet (high in protein       and vitamin C).  If you have a poor appetite, drink Ensure, Glucerna, or         [place] Instant Breakfast for the next 30 days.  If you are diabetic, you should control your blood         sugars to prevent infection and help your wound         to heal.                     f you have a poor appetite, drink Ensure, Glucerna, or [place] Instant Breakfast for the next 30 days.  If you are diabetic, you should control your blood                                                sugars to prevent infection and help your wound                                                to heal.     Prevent Infection    1. Wash your hands.  This is the most important thing you or your caregiver can do.    Wash your hands with soap and water (or use the hand  we gave you) before you touch any wounds. 2. Shower.  Use the antibacterial soap we gave you when you take a shower.  Shower with this soap until your wounds are healed. 3.  Use clean sheets.  Put freshly cleaned sheets on your bed after surgery.  To keep the surgery site clean, do not allow pets to sleep with you while your wound is still healing.  To prevent constipation, stay active and drink plenty of fluid.  While using pain medications, you should also take stool softeners and laxatives, such as Pericolace       and Miralax.  If you are having too many bowel       Movements, then you may need       to stop taking the laxatives.  You should have a bowel movement 3-4 days        after surgery and then at least every other day while       on pain medication.  To improve your recovery, you must be active!  Use your walker and take short walks (in your home)         about every 2 hours during the day.  Try to increase how far you walk each day.  You can toe touch weight bear on your Right leg as you can tolerate while walking. TTWB     To avoid getting a stiff knee, work on getting your knee bent and straight as soon as possible.  Home health physical therapy will come to your       home the day after you leave the hospital.  The       therapist will visit about 4 times over the next        couple of weeks to teach you how to get out of        bed, to safely walk in your home, and to do your        exercises.  NO DRIVING until your surgeon tells you it is ok.  You can return to work when cleared by a physician.  Please call your physician immediately if you have:     Constant bleeding from your wound.  Increasing redness or swelling around your wound (Some warmth, bruising and swelling is normal).  Change in wound drainage (increase in amount, color, or bad smell).      Change in mental status (unusual behavior   Temperature over 101.5 degrees Fahrenheit      Pain or redness in the calf (back of your lower leg - see picture)     Increased swelling of the thigh, ankle, calf, or foot.  Emergency: CALL 911 if you have:     Shortness of breath     Chest pain when you cough or taking a deep breath     Please call your surgeons office at [phone]  for a follow up appointment. _   You should call as soon as you get home or the next day after discharge. Ask to make an appointment in 2 weeks.  If you have questions or concerns during normal business hours, you may reach Dr. Heike Mchugh' Team at [phone]. 10 m with no pmhx, p/w abdominal pain, dysuria, foul smelling urine, nausea and vomiting since Thursday. Fever on friday (101F max). Mother gave peptobismol and advil with mild improvement but pain continues. Last known fever was friday at 3am but ha been on atc advil for pain. Last vomited last night. Has tolerated some food and water.   Pmhx of febrile seizures, resolved at age [age]. Past surgery on June 2nd, for painful urination to open the urethra, done at [facility], scheduled for f/u next month. 10 m with no pmhx, p/w abdominal pain, dysuria, foul smelling urine, nausea and vomiting since Thursday. Fever on friday (101F max). Mother gave pepto-bismol and advil with mild improvement but pain continues. Last known fever was friday at 3am but ha been on atc advil for pain. Last vomited last night. Has tolerated some food and water. Describes soreness on left back. Pmhx of febrile seizures, resolved at age [age]. Past surgery on June 2nd, for painful urination to open the urethra, done at [facility], scheduled for f/u next month. 8 yo  with no pmhx, p/w abdominal pain, dysuria, foul smelling urine, nausea and vomiting since Thursday. Fever on friday (101F max). Mother gave pepto-bismol and advil with mild improvement but pain continues. Last known fever was friday at 3am but ha been on atc advil for pain. Last vomited last night. Has tolerated some food and water. Describes soreness on left back. Pmhx of febrile seizures, resolved at age [age]. Past surgery on June 2nd, for painful urination to open the urethra, done at [facility], scheduled for f/u next month. 8 yo  with no pmhx, p/w abdominal pain, dysuria, foul smelling urine, nausea and vomiting, diarrhea since Thursday. Fever on friday (101F max). Mother gave pepto-bismol and advil with mild improvement but pain continues. Last known fever was friday at 3am but ha been on atc advil for pain. Last vomited last night. 1 episode of diarrhea today. Has tolerated some food and water. Describes soreness on left back. Pmhx of febrile seizures, resolved at age [age]. Past surgery on June 2nd, for painful urination to open the urethra, done at [facility], scheduled for f/u next month.

## 2024-01-29 ENCOUNTER — TELEPHONE (OUTPATIENT)
Age: 69
End: 2024-01-29

## 2024-01-29 NOTE — TELEPHONE ENCOUNTER
Called Pt and was introducing myself to a lady (wife?) and she disconnected the call. Mailed letter explaining that Pt's appt on 7/29 must be r/s due to provider cx.     R/s dates:  Sep 5th, 9th, 11th, 12th, 16th, 26th, 30th, Oct 21st & 22nd, Nov 11th

## 2024-02-29 ENCOUNTER — HOSPITAL ENCOUNTER (OUTPATIENT)
Facility: HOSPITAL | Age: 69
Discharge: HOME | End: 2024-02-29
Payer: MEDICARE

## 2024-02-29 DIAGNOSIS — M81.0 SENILE OSTEOPOROSIS: ICD-10-CM

## 2024-02-29 PROCEDURE — 77080 DXA BONE DENSITY AXIAL: CPT

## 2024-03-18 ENCOUNTER — OFFICE VISIT (OUTPATIENT)
Age: 69
End: 2024-03-18
Payer: MEDICARE

## 2024-03-18 VITALS
RESPIRATION RATE: 16 BRPM | OXYGEN SATURATION: 98 % | WEIGHT: 166.2 LBS | HEIGHT: 63 IN | TEMPERATURE: 98 F | DIASTOLIC BLOOD PRESSURE: 80 MMHG | SYSTOLIC BLOOD PRESSURE: 130 MMHG | BODY MASS INDEX: 29.45 KG/M2 | HEART RATE: 61 BPM

## 2024-03-18 DIAGNOSIS — I67.89 CEREBRAL MICROVASCULAR DISEASE: ICD-10-CM

## 2024-03-18 DIAGNOSIS — R29.6 FREQUENT FALLS: ICD-10-CM

## 2024-03-18 DIAGNOSIS — E53.8 B12 DEFICIENCY: ICD-10-CM

## 2024-03-18 DIAGNOSIS — G25.0 BENIGN ESSENTIAL TREMOR SYNDROME: Primary | ICD-10-CM

## 2024-03-18 DIAGNOSIS — R48.2 GAIT APRAXIA OF ELDERLY: ICD-10-CM

## 2024-03-18 DIAGNOSIS — I65.23 BILATERAL CAROTID ARTERY STENOSIS: ICD-10-CM

## 2024-03-18 DIAGNOSIS — R29.898 WEAKNESS OF BOTH LEGS: ICD-10-CM

## 2024-03-18 DIAGNOSIS — R27.0 ATAXIA: ICD-10-CM

## 2024-03-18 PROCEDURE — 1123F ACP DISCUSS/DSCN MKR DOCD: CPT | Performed by: PSYCHIATRY & NEUROLOGY

## 2024-03-18 PROCEDURE — G8427 DOCREV CUR MEDS BY ELIG CLIN: HCPCS | Performed by: PSYCHIATRY & NEUROLOGY

## 2024-03-18 PROCEDURE — G8399 PT W/DXA RESULTS DOCUMENT: HCPCS | Performed by: PSYCHIATRY & NEUROLOGY

## 2024-03-18 PROCEDURE — 1090F PRES/ABSN URINE INCON ASSESS: CPT | Performed by: PSYCHIATRY & NEUROLOGY

## 2024-03-18 PROCEDURE — 99214 OFFICE O/P EST MOD 30 MIN: CPT | Performed by: PSYCHIATRY & NEUROLOGY

## 2024-03-18 PROCEDURE — G8419 CALC BMI OUT NRM PARAM NOF/U: HCPCS | Performed by: PSYCHIATRY & NEUROLOGY

## 2024-03-18 PROCEDURE — G8484 FLU IMMUNIZE NO ADMIN: HCPCS | Performed by: PSYCHIATRY & NEUROLOGY

## 2024-03-18 PROCEDURE — 1036F TOBACCO NON-USER: CPT | Performed by: PSYCHIATRY & NEUROLOGY

## 2024-03-18 PROCEDURE — 3017F COLORECTAL CA SCREEN DOC REV: CPT | Performed by: PSYCHIATRY & NEUROLOGY

## 2024-03-18 RX ORDER — ATORVASTATIN CALCIUM 20 MG/1
20 TABLET, FILM COATED ORAL DAILY
COMMUNITY
Start: 2024-02-09

## 2024-03-18 ASSESSMENT — PATIENT HEALTH QUESTIONNAIRE - PHQ9
2. FEELING DOWN, DEPRESSED OR HOPELESS: NOT AT ALL
SUM OF ALL RESPONSES TO PHQ QUESTIONS 1-9: 0
SUM OF ALL RESPONSES TO PHQ QUESTIONS 1-9: 0
1. LITTLE INTEREST OR PLEASURE IN DOING THINGS: NOT AT ALL
SUM OF ALL RESPONSES TO PHQ9 QUESTIONS 1 & 2: 0
SUM OF ALL RESPONSES TO PHQ QUESTIONS 1-9: 0
SUM OF ALL RESPONSES TO PHQ QUESTIONS 1-9: 0

## 2024-03-18 NOTE — PROGRESS NOTES
Consult  REFERRED BY:  Christiana Bonilla MD    CHIEF COMPLAINT: Tremors and frequent falls recently      Subjective:     Noris Allison is a 69 y.o. right-handed  female we are seeing at the request of Dr. Bonilla, for evaluation on an urgent work in basis of a new problem of patient having at least 4 falls recently, usually when she is up and moving about, like doing Jazzercise when she fell 2 or 3 times, and having 1 episode where she fell walking in her house for unclear reason and fell and hit her hip.  She saw Dr. Macias who x-rayed her and found that she has hips that showed minimal arthritis only.  She has not 1 time at her right leg gave way in the upper hip region.  She has not had any major back pain, and she saw physical therapy and they suggested that she might be a little weak on the right side and needed to see a neurologist.  On examination today I do not really appreciate any right-sided weakness, but because of her increasing falls we will check an MRI of the brain just to make sure there is not anything going on like an acute stroke that could be causing a problem.  We will also check carotid Doppler studies to make sure she is not having any vascular insufficiency as a cause of her symptoms.  She has a tremor that seems controlled with her Inderal that she is getting from her PCP, and she stopped the primidone we gave her because she did feel little more unsteady on that.  Patient is in therapy we advised her that she needs to have them teacher gait training and strengthening and balance training also.  Patient initially 6 months ago was seen for a history of 5 or 6 years of tremor when she tries to do things like feed herself with a fork, and she has been diagnosed with benign essential tremor and has a family history that her father had the same tremor and she finds that she can take one half a tablet of primidone 50 mg at nighttime without side effects, which helps the tremor, and

## 2024-03-19 DIAGNOSIS — E53.8 B12 DEFICIENCY: ICD-10-CM

## 2024-03-19 LAB
CK SERPL-CCNC: 120 U/L (ref 26–192)
FOLATE SERPL-MCNC: 25.8 NG/ML (ref 5–21)
VIT B12 SERPL-MCNC: 718 PG/ML (ref 193–986)

## 2024-03-31 ENCOUNTER — HOSPITAL ENCOUNTER (OUTPATIENT)
Facility: HOSPITAL | Age: 69
Discharge: HOME | End: 2024-04-03
Attending: PSYCHIATRY & NEUROLOGY
Payer: MEDICARE

## 2024-03-31 DIAGNOSIS — I67.89 CEREBRAL MICROVASCULAR DISEASE: ICD-10-CM

## 2024-03-31 DIAGNOSIS — R29.6 FREQUENT FALLS: ICD-10-CM

## 2024-03-31 DIAGNOSIS — R48.2 GAIT APRAXIA OF ELDERLY: ICD-10-CM

## 2024-03-31 DIAGNOSIS — R29.898 WEAKNESS OF BOTH LEGS: ICD-10-CM

## 2024-03-31 DIAGNOSIS — G25.0 BENIGN ESSENTIAL TREMOR SYNDROME: ICD-10-CM

## 2024-03-31 DIAGNOSIS — R27.0 ATAXIA: ICD-10-CM

## 2024-03-31 PROCEDURE — 6360000004 HC RX CONTRAST MEDICATION: Performed by: PSYCHIATRY & NEUROLOGY

## 2024-03-31 PROCEDURE — A9579 GAD-BASE MR CONTRAST NOS,1ML: HCPCS | Performed by: PSYCHIATRY & NEUROLOGY

## 2024-03-31 PROCEDURE — 70553 MRI BRAIN STEM W/O & W/DYE: CPT

## 2024-03-31 RX ADMIN — GADOTERIDOL 15 ML: 279.3 INJECTION, SOLUTION INTRAVENOUS at 08:45

## 2024-04-01 ENCOUNTER — CLINICAL DOCUMENTATION (OUTPATIENT)
Age: 69
End: 2024-04-01

## 2024-04-02 NOTE — PROGRESS NOTES
I called the patient, let her know the MRI scan was normal except for age-related changes, just mild atrophic changes consistent with age most likely, and she said thank you, we will continue her workup and treatment as ordered.

## 2024-06-21 ENCOUNTER — HOSPITAL ENCOUNTER (OUTPATIENT)
Facility: HOSPITAL | Age: 69
End: 2024-06-21
Payer: MEDICARE

## 2024-06-21 DIAGNOSIS — M54.12 CERVICAL RADICULOPATHY: ICD-10-CM

## 2024-06-21 DIAGNOSIS — M54.2 NECK PAIN: ICD-10-CM

## 2024-06-21 DIAGNOSIS — M50.30 DDD (DEGENERATIVE DISC DISEASE), CERVICAL: ICD-10-CM

## 2024-06-21 DIAGNOSIS — M47.812 SPONDYLOSIS OF CERVICAL SPINE: ICD-10-CM

## 2024-06-21 PROCEDURE — 72141 MRI NECK SPINE W/O DYE: CPT

## 2024-08-28 ENCOUNTER — OFFICE VISIT (OUTPATIENT)
Age: 69
End: 2024-08-28
Payer: MEDICARE

## 2024-08-28 VITALS
BODY MASS INDEX: 29.8 KG/M2 | HEART RATE: 72 BPM | OXYGEN SATURATION: 97 % | DIASTOLIC BLOOD PRESSURE: 78 MMHG | WEIGHT: 168.2 LBS | HEIGHT: 63 IN | TEMPERATURE: 97.8 F | SYSTOLIC BLOOD PRESSURE: 132 MMHG | RESPIRATION RATE: 18 BRPM

## 2024-08-28 DIAGNOSIS — M47.22 CERVICAL RADICULOPATHY DUE TO DEGENERATIVE JOINT DISEASE OF SPINE: ICD-10-CM

## 2024-08-28 DIAGNOSIS — G25.0 BENIGN ESSENTIAL TREMOR SYNDROME: Primary | ICD-10-CM

## 2024-08-28 DIAGNOSIS — M47.27 LUMBOSACRAL RADICULOPATHY DUE TO DEGENERATIVE JOINT DISEASE OF SPINE: ICD-10-CM

## 2024-08-28 DIAGNOSIS — I65.23 BILATERAL CAROTID ARTERY STENOSIS: ICD-10-CM

## 2024-08-28 DIAGNOSIS — R29.6 FREQUENT FALLS: ICD-10-CM

## 2024-08-28 DIAGNOSIS — I67.89 CEREBRAL MICROVASCULAR DISEASE: ICD-10-CM

## 2024-08-28 PROCEDURE — G8419 CALC BMI OUT NRM PARAM NOF/U: HCPCS | Performed by: PSYCHIATRY & NEUROLOGY

## 2024-08-28 PROCEDURE — 1090F PRES/ABSN URINE INCON ASSESS: CPT | Performed by: PSYCHIATRY & NEUROLOGY

## 2024-08-28 PROCEDURE — G8399 PT W/DXA RESULTS DOCUMENT: HCPCS | Performed by: PSYCHIATRY & NEUROLOGY

## 2024-08-28 PROCEDURE — 1036F TOBACCO NON-USER: CPT | Performed by: PSYCHIATRY & NEUROLOGY

## 2024-08-28 PROCEDURE — 1123F ACP DISCUSS/DSCN MKR DOCD: CPT | Performed by: PSYCHIATRY & NEUROLOGY

## 2024-08-28 PROCEDURE — 99214 OFFICE O/P EST MOD 30 MIN: CPT | Performed by: PSYCHIATRY & NEUROLOGY

## 2024-08-28 PROCEDURE — G8427 DOCREV CUR MEDS BY ELIG CLIN: HCPCS | Performed by: PSYCHIATRY & NEUROLOGY

## 2024-08-28 PROCEDURE — 3017F COLORECTAL CA SCREEN DOC REV: CPT | Performed by: PSYCHIATRY & NEUROLOGY

## 2024-08-28 RX ORDER — METHOCARBAMOL 750 MG/1
750 TABLET, FILM COATED ORAL PRN
COMMUNITY
Start: 2024-07-23

## 2024-08-28 RX ORDER — PREGABALIN 50 MG/1
50 CAPSULE ORAL
COMMUNITY
Start: 2024-07-12

## 2024-08-28 ASSESSMENT — PATIENT HEALTH QUESTIONNAIRE - PHQ9
SUM OF ALL RESPONSES TO PHQ QUESTIONS 1-9: 0
SUM OF ALL RESPONSES TO PHQ QUESTIONS 1-9: 0
2. FEELING DOWN, DEPRESSED OR HOPELESS: NOT AT ALL
1. LITTLE INTEREST OR PLEASURE IN DOING THINGS: NOT AT ALL
SUM OF ALL RESPONSES TO PHQ9 QUESTIONS 1 & 2: 0
SUM OF ALL RESPONSES TO PHQ QUESTIONS 1-9: 0
SUM OF ALL RESPONSES TO PHQ QUESTIONS 1-9: 0

## 2024-08-28 NOTE — PROGRESS NOTES
Consult  REFERRED BY:  Christiana Bonilla MD    CHIEF COMPLAINT: Tremors and frequent falls recently      Subjective:     Noris Allison is a 69 y.o. right-handed  female we are seeing at the request of Dr. Bonilla, for evaluation of problem of patient having falls because of unsteadiness, and she had an MRI of the brain in March 2024 that was unremarkable except for mild white matter disease and she had no more dizziness of significance.  She has developed neck pain and has cervical radiculopathy and is now on Lyrica 50 mg in the morning and 100 mg at night for her pain, that is helping some, she is also taking methocarbamol 750 mg 4 times a day also as needed.  She is getting physical therapy also.  She had an MRI scan of her cervical spine that shows moderate degenerative disease and neuroforaminal disease at C5-6 and other levels less involved, and has a congenitally narrow canal, that she is seeing Dr. Murillo and they are trying to avoid surgery.  She is getting better.  She was unable to take Mysoline for her essential tremor and is now off the Inderal because she was having some syncopal episodes and possible bradycardia.  We advised her that the Lyrica helps tremor and hopefully that will be able to control her symptoms.  She does have essential tremor.  She is also getting physical therapy for her gait and has helping some to.  She is trying to exercise every day. She has a tremor that was controlled with her Inderal but because of her syncopal episodes she is off of that now.  Patient is in therapy we advised her that she needs to have them teach her gait training and strengthening and balance training also.  Patient initially 6 months ago was seen for a history of 5 or 6 years of tremor when she tries to do things like feed herself with a fork, and she has been diagnosed with benign essential tremor and has a family history that her father had the same tremor and she finds that she can not take  Heart Disease Mother     Hypertension Father     Heart Disease Father     Kidney Disease Mother       Social History     Tobacco Use    Smoking status: Never    Smokeless tobacco: Never   Substance Use Topics    Alcohol use: Yes     Alcohol/week: 3.0 standard drinks of alcohol     Types: 1 Glasses of wine, 1 Cans of beer, 1 Shots of liquor per week     Comment: couple times a week         Current Outpatient Medications:     pregabalin (LYRICA) 50 MG capsule, Take 1 capsule by mouth., Disp: , Rfl:     methocarbamol (ROBAXIN) 750 MG tablet, 1 tablet as needed, Disp: , Rfl:     Calcium Citrate-Vitamin D (CALCIUM CITRATE+D3 PO), Take by mouth, Disp: , Rfl:     atorvastatin (LIPITOR) 20 MG tablet, Take 1 tablet by mouth daily, Disp: , Rfl:     apixaban (ELIQUIS) 5 MG TABS tablet, Take 1 tablet by mouth 2 times daily, Disp: , Rfl:     Omega-3 Fatty Acids (FISH OIL OMEGA-3) 1000 MG CAPS, Take 1 capsule by mouth daily, Disp: , Rfl:     acetaminophen (TYLENOL) 500 MG tablet, Take 1 tablet by mouth as needed, Disp: , Rfl:     estrogens conjugated (PREMARIN) 0.625 MG/GM CREA vaginal cream, Place 0.5 g vaginally daily, Disp: , Rfl:     flecainide (TAMBOCOR) 50 MG tablet, Take 1 tablet by mouth at bedtime, Disp: , Rfl:     sertraline (ZOLOFT) 50 MG tablet, Take 1 tablet by mouth every evening, Disp: , Rfl:     zolpidem (AMBIEN) 5 MG tablet, Take 0.5 tablets by mouth nightly as needed., Disp: , Rfl:         Allergies   Allergen Reactions    Latex Rash    Denosumab Other (See Comments)     \" major body reaction     Nsaids Other (See Comments)     Bleeding ulcers     Adhesive Tape Rash    Erythromycin Rash    Neomycin Rash    Nickel Rash      MRI Results (most recent):  @[facility](UAP0082:1)@    @[facility]([id number]:1)@  Review of Systems:  A comprehensive review of systems was negative except for: Musculoskeletal: positive for arthralgias, myalgias, and stiff joints  Neurological: positive for dizziness and

## 2024-09-06 ENCOUNTER — TRANSCRIBE ORDERS (OUTPATIENT)
Facility: HOSPITAL | Age: 69
End: 2024-09-06

## 2024-09-06 DIAGNOSIS — Z12.31 ENCOUNTER FOR SCREENING MAMMOGRAM FOR MALIGNANT NEOPLASM OF BREAST: Primary | ICD-10-CM

## 2024-09-19 ENCOUNTER — TRANSCRIBE ORDERS (OUTPATIENT)
Facility: HOSPITAL | Age: 69
End: 2024-09-19

## 2024-09-19 DIAGNOSIS — M54.50 LUMBAR PAIN: Primary | ICD-10-CM

## 2024-09-19 DIAGNOSIS — M43.10 DEGENERATIVE SPONDYLOLISTHESIS: ICD-10-CM

## 2024-09-19 DIAGNOSIS — M41.50 DEGENERATIVE SCOLIOSIS: ICD-10-CM

## 2024-09-19 DIAGNOSIS — M51.36 DDD (DEGENERATIVE DISC DISEASE), LUMBAR: ICD-10-CM

## 2024-09-30 ENCOUNTER — HOSPITAL ENCOUNTER (OUTPATIENT)
Facility: HOSPITAL | Age: 69
Discharge: HOME | End: 2024-10-03
Attending: ORTHOPAEDIC SURGERY
Payer: MEDICARE

## 2024-09-30 DIAGNOSIS — M51.369 DDD (DEGENERATIVE DISC DISEASE), LUMBAR: ICD-10-CM

## 2024-09-30 DIAGNOSIS — M54.6 THORACIC SPINE PAIN: ICD-10-CM

## 2024-09-30 DIAGNOSIS — M43.10 DEGENERATIVE SPONDYLOLISTHESIS: ICD-10-CM

## 2024-09-30 DIAGNOSIS — M54.50 LUMBAR PAIN: ICD-10-CM

## 2024-09-30 DIAGNOSIS — M47.814 THORACIC SPONDYLOSIS: ICD-10-CM

## 2024-09-30 DIAGNOSIS — M41.50 DEGENERATIVE SCOLIOSIS: ICD-10-CM

## 2024-09-30 PROCEDURE — 72148 MRI LUMBAR SPINE W/O DYE: CPT

## 2024-09-30 PROCEDURE — 72146 MRI CHEST SPINE W/O DYE: CPT

## 2024-10-22 ENCOUNTER — HOSPITAL ENCOUNTER (OUTPATIENT)
Facility: HOSPITAL | Age: 69
Discharge: HOME | End: 2024-10-25
Payer: MEDICARE

## 2024-10-22 VITALS — HEIGHT: 63 IN | BODY MASS INDEX: 29.8 KG/M2 | WEIGHT: 168.2 LBS

## 2024-10-22 DIAGNOSIS — Z12.31 ENCOUNTER FOR SCREENING MAMMOGRAM FOR MALIGNANT NEOPLASM OF BREAST: ICD-10-CM

## 2024-10-22 PROCEDURE — 77063 BREAST TOMOSYNTHESIS BI: CPT

## 2024-12-21 ENCOUNTER — HOSPITAL ENCOUNTER (OUTPATIENT)
Facility: HOSPITAL | Age: 69
Discharge: HOME | End: 2024-12-24
Attending: ORTHOPAEDIC SURGERY
Payer: MEDICARE

## 2024-12-21 DIAGNOSIS — M70.61 TROCHANTERIC BURSITIS OF RIGHT HIP: ICD-10-CM

## 2024-12-21 PROCEDURE — 73721 MRI JNT OF LWR EXTRE W/O DYE: CPT

## 2025-01-29 ENCOUNTER — TRANSCRIBE ORDERS (OUTPATIENT)
Facility: HOSPITAL | Age: 70
End: 2025-01-29

## 2025-01-29 DIAGNOSIS — Z96.651 HISTORY OF TOTAL RIGHT KNEE REPLACEMENT: Primary | ICD-10-CM

## 2025-01-29 DIAGNOSIS — M25.561 ACUTE PAIN OF RIGHT KNEE: ICD-10-CM

## 2025-02-04 ENCOUNTER — HOSPITAL ENCOUNTER (OUTPATIENT)
Facility: HOSPITAL | Age: 70
Discharge: HOME | End: 2025-02-07
Attending: ORTHOPAEDIC SURGERY
Payer: MEDICARE

## 2025-02-04 DIAGNOSIS — Z96.651 HISTORY OF TOTAL RIGHT KNEE REPLACEMENT: ICD-10-CM

## 2025-02-04 DIAGNOSIS — M25.561 ACUTE PAIN OF RIGHT KNEE: ICD-10-CM

## 2025-02-04 PROCEDURE — 73721 MRI JNT OF LWR EXTRE W/O DYE: CPT

## 2025-02-18 ENCOUNTER — HOSPITAL ENCOUNTER (OUTPATIENT)
Facility: HOSPITAL | Age: 70
Discharge: HOME | End: 2025-02-21
Payer: MEDICARE

## 2025-02-18 VITALS
HEIGHT: 64 IN | TEMPERATURE: 97.3 F | RESPIRATION RATE: 20 BRPM | WEIGHT: 172.62 LBS | HEART RATE: 52 BPM | BODY MASS INDEX: 29.47 KG/M2 | OXYGEN SATURATION: 100 %

## 2025-02-18 LAB
ABO + RH BLD: NORMAL
APPEARANCE UR: CLEAR
BACTERIA URNS QL MICRO: ABNORMAL /HPF
BILIRUB UR QL: NEGATIVE
BLOOD GROUP ANTIBODIES SERPL: NORMAL
COLOR UR: ABNORMAL
EPITH CASTS URNS QL MICRO: ABNORMAL /LPF
EST. AVERAGE GLUCOSE BLD GHB EST-MCNC: 105 MG/DL
GLUCOSE UR STRIP.AUTO-MCNC: NEGATIVE MG/DL
HBA1C MFR BLD: 5.3 % (ref 4–5.6)
HGB UR QL STRIP: NEGATIVE
INR PPP: 1.1 (ref 0.9–1.1)
KETONES UR QL STRIP.AUTO: NEGATIVE MG/DL
LEUKOCYTE ESTERASE UR QL STRIP.AUTO: ABNORMAL
NITRITE UR QL STRIP.AUTO: NEGATIVE
PH UR STRIP: 7 (ref 5–8)
PROT UR STRIP-MCNC: NEGATIVE MG/DL
PROTHROMBIN TIME: 11.2 SEC (ref 9.2–11.2)
RBC #/AREA URNS HPF: ABNORMAL /HPF (ref 0–5)
SP GR UR REFRACTOMETRY: 1.01
SPECIMEN EXP DATE BLD: NORMAL
URINE CULTURE IF INDICATED: ABNORMAL
UROBILINOGEN UR QL STRIP.AUTO: 0.2 EU/DL (ref 0.2–1)
WBC URNS QL MICRO: ABNORMAL /HPF (ref 0–4)

## 2025-02-18 PROCEDURE — 97530 THERAPEUTIC ACTIVITIES: CPT

## 2025-02-18 PROCEDURE — 85610 PROTHROMBIN TIME: CPT

## 2025-02-18 PROCEDURE — 36415 COLL VENOUS BLD VENIPUNCTURE: CPT

## 2025-02-18 PROCEDURE — 83036 HEMOGLOBIN GLYCOSYLATED A1C: CPT

## 2025-02-18 PROCEDURE — 86850 RBC ANTIBODY SCREEN: CPT

## 2025-02-18 PROCEDURE — 86900 BLOOD TYPING SEROLOGIC ABO: CPT

## 2025-02-18 PROCEDURE — 87088 URINE BACTERIA CULTURE: CPT

## 2025-02-18 PROCEDURE — 97161 PT EVAL LOW COMPLEX 20 MIN: CPT

## 2025-02-18 PROCEDURE — 87186 SC STD MICRODIL/AGAR DIL: CPT

## 2025-02-18 PROCEDURE — 87086 URINE CULTURE/COLONY COUNT: CPT

## 2025-02-18 PROCEDURE — 86901 BLOOD TYPING SEROLOGIC RH(D): CPT

## 2025-02-18 PROCEDURE — 81001 URINALYSIS AUTO W/SCOPE: CPT

## 2025-02-18 RX ORDER — SODIUM CHLORIDE, SODIUM LACTATE, POTASSIUM CHLORIDE, CALCIUM CHLORIDE 600; 310; 30; 20 MG/100ML; MG/100ML; MG/100ML; MG/100ML
INJECTION, SOLUTION INTRAVENOUS CONTINUOUS
OUTPATIENT
Start: 2025-02-25

## 2025-02-18 RX ORDER — CELECOXIB 100 MG/1
100 CAPSULE ORAL 2 TIMES DAILY
COMMUNITY

## 2025-02-18 RX ORDER — CEFAZOLIN SODIUM/WATER 2 G/20 ML
2000 SYRINGE (ML) INTRAVENOUS ONCE
OUTPATIENT
Start: 2025-02-25

## 2025-02-18 RX ORDER — METHOCARBAMOL 750 MG/1
750 TABLET, FILM COATED ORAL 3 TIMES DAILY PRN
COMMUNITY

## 2025-02-18 RX ORDER — ACETAMINOPHEN 500 MG
1000 TABLET ORAL ONCE
OUTPATIENT
Start: 2025-02-25

## 2025-02-18 RX ORDER — MULTIVITAMIN WITH IRON
1 TABLET ORAL DAILY
COMMUNITY

## 2025-02-18 RX ORDER — ESOMEPRAZOLE MAGNESIUM 20 MG/1
20 GRANULE, DELAYED RELEASE ORAL DAILY
COMMUNITY

## 2025-02-18 RX ORDER — PROPRANOLOL HCL 20 MG
20 TABLET ORAL 2 TIMES DAILY
COMMUNITY

## 2025-02-18 RX ORDER — CELECOXIB 200 MG/1
400 CAPSULE ORAL ONCE
OUTPATIENT
Start: 2025-02-25

## 2025-02-18 ASSESSMENT — PROMIS GLOBAL HEALTH SCALE
IN THE PAST 7 DAYS, HOW OFTEN HAVE YOU BEEN BOTHERED BY EMOTIONAL PROBLEMS, SUCH AS FEELING ANXIOUS, DEPRESSED, OR IRRITABLE [ON A SCALE FROM 1 (NEVER) TO 5 (ALWAYS)]?: OFTEN
IN THE PAST 7 DAYS, HOW WOULD YOU RATE YOUR FATIGUE ON AVERAGE [ON A SCALE FROM 1 (NONE) TO 5 (VERY SEVERE)]?: MODERATE
IN GENERAL, WOULD YOU SAY YOUR HEALTH IS...[ON A SCALE OF 1 (POOR) TO 5 (EXCELLENT)]: VERY GOOD
TO WHAT EXTENT ARE YOU ABLE TO CARRY OUT YOUR EVERYDAY PHYSICAL ACTIVITIES SUCH AS WALKING, CLIMBING STAIRS, CARRYING GROCERIES, OR MOVING A CHAIR [ON A SCALE OF 1 (NOT AT ALL) TO 5 (COMPLETELY)]?: A LITTLE
IN GENERAL, HOW WOULD YOU RATE YOUR MENTAL HEALTH, INCLUDING YOUR MOOD AND YOUR ABILITY TO THINK [ON A SCALE OF 1 (POOR) TO 5 (EXCELLENT)]?: FAIR
SUM OF RESPONSES TO QUESTIONS 2, 4, 5, & 10: 10
HOW IS THE PROMIS V1.1 BEING ADMINISTERED?: PAPER
WHO IS THE PERSON COMPLETING THE PROMIS V1.1 SURVEY?: SELF
SUM OF RESPONSES TO QUESTIONS 3, 6, 7, & 8: 16
IN GENERAL, HOW WOULD YOU RATE YOUR PHYSICAL HEALTH [ON A SCALE OF 1 (POOR) TO 5 (EXCELLENT)]?: VERY GOOD
IN GENERAL, HOW WOULD YOU RATE YOUR SATISFACTION WITH YOUR SOCIAL ACTIVITIES AND RELATIONSHIPS [ON A SCALE OF 1 (POOR) TO 5 (EXCELLENT)]?: FAIR
IN GENERAL, PLEASE RATE HOW WELL YOU CARRY OUT YOUR USUAL SOCIAL ACTIVITIES (INCLUDES ACTIVITIES AT HOME, AT WORK, AND IN YOUR COMMUNITY, AND RESPONSIBILITIES AS A PARENT, CHILD, SPOUSE, EMPLOYEE, FRIEND, ETC) [ON A SCALE OF 1 (POOR) TO 5 (EXCELLENT)]?: FAIR
IN GENERAL, WOULD YOU SAY YOUR QUALITY OF LIFE IS...[ON A SCALE OF 1 (POOR) TO 5 (EXCELLENT)]: VERY GOOD
IN THE PAST 7 DAYS, HOW WOULD YOU RATE YOUR PAIN ON AVERAGE [ON A SCALE FROM 0 (NO PAIN) TO 10 (WORST IMAGINABLE PAIN)]?: 7

## 2025-02-18 ASSESSMENT — KOOS JR
STANDING UPRIGHT: MILD
TWISING OR PIVOTING ON KNEE: SEVERE
RISING FROM SITTING: MILD
KOOS JR TOTAL INTERVAL SCORE: 54.84
BENDING TO THE FLOOR TO PICK UP OBJECT: SEVERE
STRAIGHTENING KNEE FULLY: MILD
GOING UP OR DOWN STAIRS: EXTREME

## 2025-02-18 ASSESSMENT — PAIN DESCRIPTION - ORIENTATION: ORIENTATION: RIGHT

## 2025-02-18 ASSESSMENT — PAIN SCALES - GENERAL: PAINLEVEL_OUTOF10: 3

## 2025-02-18 ASSESSMENT — PAIN DESCRIPTION - DESCRIPTORS: DESCRIPTORS: BURNING;SHARP

## 2025-02-18 ASSESSMENT — PAIN DESCRIPTION - FREQUENCY: FREQUENCY: CONTINUOUS

## 2025-02-18 ASSESSMENT — PAIN DESCRIPTION - LOCATION: LOCATION: KNEE

## 2025-02-18 NOTE — PERIOP NOTE
Faxed spinal anesthesia form to Dr xiong office to fill out and fax back when pt needs to stop eliquis prior to surgery. Pt knows to expect a call from PAT on these instructions once we get them from Dr Sanchez.

## 2025-02-19 LAB
BACTERIA SPEC CULT: NORMAL
BACTERIA SPEC CULT: NORMAL
SERVICE CMNT-IMP: NORMAL

## 2025-02-19 NOTE — PERIOP NOTE
Pt instructed to stop eliquis 3 days prior to procedure from Dr muñoz. Called pt with these instructions. Pt knows last day will be 02/21/25.

## 2025-02-19 NOTE — PROGRESS NOTES
Dr Campos reviewed MRI lumbar spine from 10/3/2024. Patient concerned with having spinal anesthesia. Per Dr Campos, have anesthesiologist talk with patient the day of surgery.

## 2025-02-20 LAB
BACTERIA SPEC CULT: ABNORMAL
CC UR VC: ABNORMAL
SERVICE CMNT-IMP: ABNORMAL

## 2025-02-20 RX ORDER — SULFAMETHOXAZOLE AND TRIMETHOPRIM 800; 160 MG/1; MG/1
1 TABLET ORAL 2 TIMES DAILY
COMMUNITY
Start: 2025-02-20

## 2025-02-20 NOTE — PROGRESS NOTES
[facility]  Joint/Spine Preoperative Instructions        Surgery Date 02/25/25          Time of Arrival to be called on 02/24/25 after 2 pm  Contact: [phone]   1. On the day of your surgery, please report to the Surgical Services Registration Desk and sign in at your designated time. The Surgery Center is located to the right of the Emergency Room.     2. You must have someone with you to drive you home. You should not drive a car for 24 hours following surgery. Please make arrangements for a friend or family member to stay with you for the first 24 hours after your surgery.    3. No food after midnight 02/24/25.  Medications morning of surgery should be taken with a sip of water.  Please follow pre-surgery drink instructions that were given at your Pre Admission Testing appointment.      4. We recommend you do not drink any alcoholic beverages for 24 hours before and after your surgery.    5. Contact your surgeon’s office for instructions on the following medications: non-steroidal anti-inflammatory drugs (i.e. Advil, Aleve), vitamins, and supplements. (Some surgeon’s will want you to stop these medications prior to surgery and others may allow you to take them)  **If you are currently taking Plavix, Coumadin, Aspirin and/or other blood-thinning agents, contact your surgeon for instructions.** Your surgeon will partner with the physician prescribing these medications to determine if it is safe to stop or if you need to continue taking.  Please do not stop taking these medications without instructions from your surgeon    6. Wear comfortable clothes.  Wear glasses instead of contacts.  Do not bring any money or jewelry. Please bring picture ID, insurance card, and any prearranged co-payment or hospital payment.  Do not wear make-up, particularly mascara the morning of your surgery.  Do not wear nail polish, particularly if you are having foot /hand surgery.  Wear your hair 
Faxed and called Dr Macias/ Renetta-left voice message about final urine culture. Requested call back about treatment. Fax confirmed.     Received fax from Dr Macias- script for bactrim ds 1 tab BID for 7 days. Called patient and voice message left requesting call back to ensure message was received.     2/21/2025 0724 am  Patient returned call and verbalizes understanding. She states she has already picked up her prescription.   
Hibiclens/Chlorhexidine    Preventing Infections Before and After - Your Surgery    IMPORTANT INSTRUCTIONS    Please read and follow these instructions carefully. If you are unable to comply with the below instructions your procedure will be cancelled.       Every Night for Three (3) nights before your surgery:  Shower with an antibacterial soap, such as Dial, or the soap provided at your preassessment appointment. A shower is better than a bath for cleaning your skin.  If needed, ask someone to help you reach all areas of your body. Don’t forget to clean your belly button with every shower.    The night before your surgery:   If you lose your Hibiclens/chlorhexidine please contact surgery center or you can purchase it at a local pharmacy  On the night before your surgery, shower with an antibacterial soap, such as Dial, or the soap provided at your preassessment appointment.   With bottle of Hibiclens/Chlorhexidine in hand, turn water off.  Apply Hibiclens antiseptic skin cleanser with a clean, freshly washed washcloth.  Gently apply to your body from chin to toes (except the genital area) and especially the area(s) where your incision(s) will be.  Leave Hibiclens/Chlorhexidine on your skin for at least 20 seconds.    CAUTION: If needed, Hibiclens/chlorhexidine may be used to clean the folds of skin of the legs (such as in the area of the groin) and on your buttocks and hips. However, do not use Hibiclens/Chlorhexidine above the neck or in the genital area (your bottom) or put inside any area of your body.  Turn the water back on and rinse.  Dry gently with a clean, freshly washed towel.  After your shower, do not use any powder, deodorant, perfumes or lotion.  Use clean, freshly washed towels and washcloths every time you shower.  Wear clean, freshly washed pajamas to bed the night before surgery.  Sleep on clean, freshly washed sheets.  Do not allow pets to sleep in your bed with you.        The Morning of your 
Incentive Spirometer        Using the incentive spirometer helps expand the small air sacs of your lungs, helps you breathe deeply, and helps improve your lung function.  Use your incentive spirometer twice a day (10 breaths each time) prior to surgery.      How to Use Your Incentive Spirometer:  Hold the incentive spirometer in an upright position.   Breathe out as usual.   Place the mouthpiece in your mouth and seal your lips tightly around it.   Take a deep breath.  Breathe in slowly and as deeply as possible. Keep the blue flow rate guide between the arrows.   Hold your breath as long as possible. Then exhale slowly and allow the piston to fall to the bottom of the column.   Rest for a few seconds and repeat steps one through five at least 10 times.     PAT Tidal Volume_____2500_____________  x________________  Date_____2/18/25__________________    BRING THE INCENTIVE SPIROMETER WITH YOU TO THE HOSPITAL ON THE DAY OF YOUR SURGERY.  Opportunity given to ask and answer questions as well as to observe return demonstration.    Patient signature_____________________________    Witness____________________________  
Orthopedic and Spine Patients:  Instructions on When You Can   Eat or Drink Before Surgery      You have been provided 2 pre-surgery drinks received at your pre-admission testing appointment.    Night before surgery:  You should drink one bottle of the  pre-surgery drink at bedtime. No food after midnight!    Day of Surgery:  Complete 2nd bottle of the pre-surgery drink 1 hour prior to arrival at hospital.  For questions call Pre-Admission Testing at [phone].  They are available from 8:00am-5:00pm, Monday through Friday.  
Patient attended the Joint Replacement Education Class at [facility]. The content of the class was presented using a power point presentation specific for patients undergoing hip and knee replacement surgery. The [facility] [place] Joint Replacement Education Handbook was given to the patient.  Preparing for surgery, day of surgery routine and expectations, discharge process and help at home expectations, nutrition,medications, infection control, pain management, DVT prevention, ice therapy and safety were reviewed in class. Opportunity for questions provided, patient verbalized understanding of instructions.    Patient reports does not have RW for after surgery.  Order placed through [employer] to obtain RW from [facility] on DOS 2025.    PROMis/KOOS completed 2025.  KNEE DISABILITY OSTEOARTHRITIS AND OUTCOME SCORE    Stiffness - The following question concerns the amount of joing stiffness you have experienced during the last week in your knee. Stiffness is a sensation of restriction or slowness in the ease with which you move your knee joint.  How severe is your knee stiffness after first wakening in the morning?: 0        Pain - What amount of knee pain have you experienced the last week during the following activities?  Twisting/pivoting on your knee: 3  Straightening knee fully: 1  Going up or down stairs: 4  Standing upright: 1        Function - Please indicate the degree of difficulty you have experienced in the last week due to your knee.  Rising from sittin  Bending to floor/ an object: 3        Raw Score  Jr OPAL. Knee Survey Score: 13  KOOS JR Total Interval Score (0-100 Scale): 54.84      PROMIS Questions    In general, would you say your health is:: 4    In general, would you say your quality of life is:: 4    In general, how would you rate your physical health?: 4    In general, how would you rate your mental health, including 
The [facility] [place] patient handbook provided & reviewed during the patients pre-admission testing (PAT) appointment. An opportunity for questions was provided, patient verbalized understanding.   
normal  Sensation: WFL    Range Of Motion:  WFL    Functional Mobility:  Bed Mobility:    Rolling Independent  Sit to supine Independent  Supine to sit Independent    Transfers:  Sit to stand Independent  Stand to sit Independent  Bed to chair transfer Independent  Balance:     Sitting static: Good (unsupported);  Sitting dynamic: Good (unsupported);  Standing static: Good (unsupported);  Standing dynamic: Good (unsupported);    Ambulation/Gait Training:  Level of assist: independence  Base of support: WFL  Gait speed and charly: WFL  Gait abnormalities: none limited antalgia but does not present significantly in gait pattern  Distance (feet): 25 feet   Equipment used during ambulation: no equipment             Functional Measure:  [facility] AM-PAC®      Basic Mobility Inpatient Short Form (6-Clicks) Version 2  How much HELP from another person do you currently need... (If the patient hasn't done an activity recently, how much help from another person do you think they would need if they tried?) Total A Lot A Little None   1.  Turning from your back to your side while in a flat bed without using bedrails? []  1 []  2 []  3  [x]  4   2.  Moving from lying on your back to sitting on the side of a flat bed without using bedrails? []  1 []  2 []  3  [x]  4   3.  Moving to and from a bed to a chair (including a wheelchair)? []  1 []  2 []  3  [x]  4   4. Standing up from a chair using your arms (e.g. wheelchair or bedside chair)? []  1 []  2 []  3  [x]  4   5.  Walking in hospital room? []  1 []  2 []  3  [x]  4   6.  Climbing 3-5 steps with a railing? []  1 []  2 []  3  [x]  4     Raw Score: 24/24                            Cutoff score <=171,2,3 had higher odds of discharging home with home health or need of SNF/IPR.    1. iDamond Louie, Leslie King, Tate Wiley, Ewa Curran, Juancho Alonso, Jett Louie.  Validity of the AM-PAC “6-Clicks” Inpatient Daily Activity and Basic Mobility

## 2025-02-24 ENCOUNTER — ANESTHESIA EVENT (OUTPATIENT)
Facility: HOSPITAL | Age: 70
DRG: 489 | End: 2025-02-24
Payer: MEDICARE

## 2025-02-24 NOTE — DISCHARGE INSTRUCTIONS
Discharge Instructions:  Noris Allison    Surgery:  Right Patella Resurfacing .        To relieve pain:  Use ice/gel packs.    -Put the ice pack directly over the wound, or anywhere you are hurting or swollen.   -To control pain and swelling, keep ice on regularly, especially after physical activity.  -The packs should stay cold for 3-4 hours.  When it is not cold anymore, rotate with the packs in the freezer.      Elevate your leg.  This will also keep swelling down.    Rest for at least 20 minutes between activity or exercises.    To keep track of your pain medications, write down what you take and when you take it.    The last dose of pain medication you got in the hospital was:     Medication Oxycodone   Dose 5mg   Date & Time 2/25/25 1:15pm     Choose your medications based on the pain scale below:    To keep your pain under control, take Tylenol every 6 hours for 14 days - even if you feel like you don’t need it.     For mild to moderate pain (4-6 on pain scale), take one pain pill every 4 hours or as instructed.     For severe pain (7-10 on pain scale), take two pain pills every 4 hours or as instructed.     To prevent nausea, take your pain medications with food.                                            Pain Scale              As your pain lessens:    Slowly start taking less pain medication. You may do this by waiting longer between doses or by taking smaller doses.    Stop using the pain medications as soon as you no longer need it, usually in 2-3 weeks.        Eliquis  To prevent blood clots, you will need to resume taking Eliquis             To prevent stomach upset or bleeding:  Take Pepcid 20 mg twice a day, or a similar home medication, while you are taking a blood thinner.         Keep your waterproof dressing in place. It will be removed by your surgeon during your follow-up appointment in 2 weeks.     You may need to change the dressing if you are having drainage to where the dressing is

## 2025-02-24 NOTE — H&P
Fabrizio Macias MD - Adult Reconstruction and Total Joint Replacement     Orthopaedic History and Physical        NAME: Noris Alilson       :  1955       MRN:  [id number]          Carlos Villatoro - 2025 3:00 PM EST  Formatting of this note is different from the original.    This note has been transcribed electronically and is believed to be accurate but may contain errors due to technological limitations.  Subjective:  Patient ID: Noris Allison is a 70 y.o. female.  Chief Complaint: Follow-up of the Right Knee    Ms. Allison presents for follow up for her right knee. Her anterior pain has worsened with time, and she cannot go up and down stairs and walk normally without difficulty. Pain limits her ability to do daily activities and chores. She continues to have significant knee pain and swelling, but her right hip pain is getting better. She had a recent MARS scan.    Patient Active Problem List    Diagnosis Date Noted    Cervical radiculopathy due to degenerative joint disease of spine 2024    Lumbosacral radiculopathy due to degenerative joint disease of spine 2024    B12 deficiency 2024    Weakness of both legs 2024    Gait apraxia of elderly 2024    Cerebral microvascular disease 2024    Ataxia 2024    Bilateral carotid artery stenosis 2024    Frequent falls 2024    Benign essential tremor syndrome 2023    Status post total knee replacement 2019    S/P ablation of atrial fibrillation 2017    Primary localized osteoarthrosis, lower leg 2014    GI bleed 10/02/2013     Past Medical History:   Diagnosis Date    Anxiety     Cervical spinal stenosis     Chondral defect of right patella     Depression     GERD (gastroesophageal reflux disease)     H/O hammer toe correction     Lumbar spinal stenosis     Osteoarthritis     knee, thumb    Osteoporosis     PONV (postoperative nausea and vomiting)     Psychiatric disorder

## 2025-02-25 ENCOUNTER — ANESTHESIA (OUTPATIENT)
Facility: HOSPITAL | Age: 70
DRG: 489 | End: 2025-02-25
Payer: MEDICARE

## 2025-02-25 ENCOUNTER — HOSPITAL ENCOUNTER (INPATIENT)
Facility: HOSPITAL | Age: 70
LOS: 1 days | Discharge: HOME | DRG: 489 | End: 2025-02-25
Attending: ORTHOPAEDIC SURGERY | Admitting: ORTHOPAEDIC SURGERY
Payer: MEDICARE

## 2025-02-25 VITALS
HEART RATE: 58 BPM | SYSTOLIC BLOOD PRESSURE: 130 MMHG | DIASTOLIC BLOOD PRESSURE: 89 MMHG | BODY MASS INDEX: 30.39 KG/M2 | RESPIRATION RATE: 18 BRPM | WEIGHT: 171.52 LBS | TEMPERATURE: 97.4 F | OXYGEN SATURATION: 96 % | HEIGHT: 63 IN

## 2025-02-25 DIAGNOSIS — Z96.651 STATUS POST REVISION OF TOTAL REPLACEMENT OF RIGHT KNEE: Primary | ICD-10-CM

## 2025-02-25 PROBLEM — M22.90: Status: ACTIVE | Noted: 2025-02-25

## 2025-02-25 PROCEDURE — 2580000003 HC RX 258

## 2025-02-25 PROCEDURE — 2500000003 HC RX 250 WO HCPCS: Performed by: ORTHOPAEDIC SURGERY

## 2025-02-25 PROCEDURE — C1776 JOINT DEVICE (IMPLANTABLE): HCPCS | Performed by: ORTHOPAEDIC SURGERY

## 2025-02-25 PROCEDURE — 7100000000 HC PACU RECOVERY - FIRST 15 MIN: Performed by: ORTHOPAEDIC SURGERY

## 2025-02-25 PROCEDURE — 6360000002 HC RX W HCPCS: Performed by: ANESTHESIOLOGY

## 2025-02-25 PROCEDURE — 2500000003 HC RX 250 WO HCPCS

## 2025-02-25 PROCEDURE — 1100000000 HC RM PRIVATE

## 2025-02-25 PROCEDURE — 7100000010 HC PHASE II RECOVERY - FIRST 15 MIN: Performed by: ORTHOPAEDIC SURGERY

## 2025-02-25 PROCEDURE — 76942 ECHO GUIDE FOR BIOPSY: CPT | Performed by: ANESTHESIOLOGY

## 2025-02-25 PROCEDURE — 97530 THERAPEUTIC ACTIVITIES: CPT

## 2025-02-25 PROCEDURE — 3600000015 HC SURGERY LEVEL 5 ADDTL 15MIN: Performed by: ORTHOPAEDIC SURGERY

## 2025-02-25 PROCEDURE — 0QUD0JZ SUPPLEMENT RIGHT PATELLA WITH SYNTHETIC SUBSTITUTE, OPEN APPROACH: ICD-10-PCS | Performed by: ORTHOPAEDIC SURGERY

## 2025-02-25 PROCEDURE — 3600000005 HC SURGERY LEVEL 5 BASE: Performed by: ORTHOPAEDIC SURGERY

## 2025-02-25 PROCEDURE — 6370000000 HC RX 637 (ALT 250 FOR IP): Performed by: ORTHOPAEDIC SURGERY

## 2025-02-25 PROCEDURE — 6370000000 HC RX 637 (ALT 250 FOR IP)

## 2025-02-25 PROCEDURE — 6360000002 HC RX W HCPCS

## 2025-02-25 PROCEDURE — 7100000001 HC PACU RECOVERY - ADDTL 15 MIN: Performed by: ORTHOPAEDIC SURGERY

## 2025-02-25 PROCEDURE — 7100000011 HC PHASE II RECOVERY - ADDTL 15 MIN: Performed by: ORTHOPAEDIC SURGERY

## 2025-02-25 PROCEDURE — 6360000002 HC RX W HCPCS: Performed by: ORTHOPAEDIC SURGERY

## 2025-02-25 PROCEDURE — 97161 PT EVAL LOW COMPLEX 20 MIN: CPT

## 2025-02-25 PROCEDURE — 3E0T3BZ INTRODUCTION OF ANESTHETIC AGENT INTO PERIPHERAL NERVES AND PLEXI, PERCUTANEOUS APPROACH: ICD-10-PCS | Performed by: ORTHOPAEDIC SURGERY

## 2025-02-25 PROCEDURE — 2709999900 HC NON-CHARGEABLE SUPPLY: Performed by: ORTHOPAEDIC SURGERY

## 2025-02-25 PROCEDURE — 3E033XZ INTRODUCTION OF VASOPRESSOR INTO PERIPHERAL VEIN, PERCUTANEOUS APPROACH: ICD-10-PCS | Performed by: ORTHOPAEDIC SURGERY

## 2025-02-25 PROCEDURE — 64447 NJX AA&/STRD FEMORAL NRV IMG: CPT | Performed by: ANESTHESIOLOGY

## 2025-02-25 PROCEDURE — 3700000001 HC ADD 15 MINUTES (ANESTHESIA): Performed by: ORTHOPAEDIC SURGERY

## 2025-02-25 PROCEDURE — 3700000000 HC ANESTHESIA ATTENDED CARE: Performed by: ORTHOPAEDIC SURGERY

## 2025-02-25 PROCEDURE — 97116 GAIT TRAINING THERAPY: CPT

## 2025-02-25 DEVICE — CEMENT BNE RADIOPAQUE FAST SET ACRYL RESIN HI VISC SIMPLEXHV: Type: IMPLANTABLE DEVICE | Site: PATELLA | Status: FUNCTIONAL

## 2025-02-25 DEVICE — IMPLANTABLE DEVICE
Type: IMPLANTABLE DEVICE | Site: PATELLA | Status: FUNCTIONAL
Brand: VANGUARD® KNEE SYSTEM

## 2025-02-25 RX ORDER — TRANEXAMIC ACID 100 MG/ML
INJECTION, SOLUTION INTRAVENOUS
Status: DISCONTINUED | OUTPATIENT
Start: 2025-02-25 | End: 2025-02-25 | Stop reason: SDUPTHER

## 2025-02-25 RX ORDER — TRAMADOL HYDROCHLORIDE 50 MG/1
50 TABLET ORAL EVERY 4 HOURS PRN
Status: DISCONTINUED | OUTPATIENT
Start: 2025-02-25 | End: 2025-02-25

## 2025-02-25 RX ORDER — BUPIVACAINE HYDROCHLORIDE 2.5 MG/ML
INJECTION, SOLUTION EPIDURAL; INFILTRATION; INTRACAUDAL
Status: COMPLETED | OUTPATIENT
Start: 2025-02-25 | End: 2025-02-25

## 2025-02-25 RX ORDER — CELECOXIB 200 MG/1
400 CAPSULE ORAL ONCE
Status: COMPLETED | OUTPATIENT
Start: 2025-02-25 | End: 2025-02-25

## 2025-02-25 RX ORDER — SODIUM CHLORIDE 0.9 % (FLUSH) 0.9 %
5-40 SYRINGE (ML) INJECTION PRN
Status: DISCONTINUED | OUTPATIENT
Start: 2025-02-25 | End: 2025-02-25 | Stop reason: HOSPADM

## 2025-02-25 RX ORDER — BUPIVACAINE HYDROCHLORIDE 2.5 MG/ML
INJECTION, SOLUTION INFILTRATION; PERINEURAL
Status: COMPLETED | OUTPATIENT
Start: 2025-02-25 | End: 2025-02-25

## 2025-02-25 RX ORDER — SODIUM CHLORIDE, SODIUM LACTATE, POTASSIUM CHLORIDE, CALCIUM CHLORIDE 600; 310; 30; 20 MG/100ML; MG/100ML; MG/100ML; MG/100ML
INJECTION, SOLUTION INTRAVENOUS CONTINUOUS
Status: DISCONTINUED | OUTPATIENT
Start: 2025-02-25 | End: 2025-02-25 | Stop reason: HOSPADM

## 2025-02-25 RX ORDER — FENTANYL CITRATE 50 UG/ML
25 INJECTION, SOLUTION INTRAMUSCULAR; INTRAVENOUS EVERY 5 MIN PRN
Status: DISCONTINUED | OUTPATIENT
Start: 2025-02-25 | End: 2025-02-25 | Stop reason: HOSPADM

## 2025-02-25 RX ORDER — ROCURONIUM BROMIDE 10 MG/ML
INJECTION, SOLUTION INTRAVENOUS
Status: DISCONTINUED | OUTPATIENT
Start: 2025-02-25 | End: 2025-02-25 | Stop reason: SDUPTHER

## 2025-02-25 RX ORDER — SODIUM CHLORIDE 0.9 % (FLUSH) 0.9 %
5-40 SYRINGE (ML) INJECTION EVERY 12 HOURS SCHEDULED
Status: DISCONTINUED | OUTPATIENT
Start: 2025-02-25 | End: 2025-02-25 | Stop reason: HOSPADM

## 2025-02-25 RX ORDER — LIDOCAINE HYDROCHLORIDE 10 MG/ML
1 INJECTION, SOLUTION EPIDURAL; INFILTRATION; INTRACAUDAL; PERINEURAL
Status: DISCONTINUED | OUTPATIENT
Start: 2025-02-25 | End: 2025-02-25 | Stop reason: HOSPADM

## 2025-02-25 RX ORDER — NALOXONE HYDROCHLORIDE 0.4 MG/ML
INJECTION, SOLUTION INTRAMUSCULAR; INTRAVENOUS; SUBCUTANEOUS PRN
Status: DISCONTINUED | OUTPATIENT
Start: 2025-02-25 | End: 2025-02-25 | Stop reason: HOSPADM

## 2025-02-25 RX ORDER — HYDROMORPHONE HYDROCHLORIDE 1 MG/ML
0.5 INJECTION, SOLUTION INTRAMUSCULAR; INTRAVENOUS; SUBCUTANEOUS EVERY 5 MIN PRN
Status: DISCONTINUED | OUTPATIENT
Start: 2025-02-25 | End: 2025-02-25 | Stop reason: HOSPADM

## 2025-02-25 RX ORDER — FENTANYL CITRATE 50 UG/ML
INJECTION, SOLUTION INTRAMUSCULAR; INTRAVENOUS
Status: DISCONTINUED | OUTPATIENT
Start: 2025-02-25 | End: 2025-02-25 | Stop reason: SDUPTHER

## 2025-02-25 RX ORDER — DEXAMETHASONE SODIUM PHOSPHATE 4 MG/ML
INJECTION, SOLUTION INTRA-ARTICULAR; INTRALESIONAL; INTRAMUSCULAR; INTRAVENOUS; SOFT TISSUE
Status: DISCONTINUED | OUTPATIENT
Start: 2025-02-25 | End: 2025-02-25 | Stop reason: SDUPTHER

## 2025-02-25 RX ORDER — GLYCOPYRROLATE 0.2 MG/ML
INJECTION INTRAMUSCULAR; INTRAVENOUS
Status: DISCONTINUED | OUTPATIENT
Start: 2025-02-25 | End: 2025-02-25 | Stop reason: SDUPTHER

## 2025-02-25 RX ORDER — OXYCODONE HYDROCHLORIDE 5 MG/1
5 TABLET ORAL EVERY 4 HOURS PRN
Status: DISCONTINUED | OUTPATIENT
Start: 2025-02-25 | End: 2025-02-25 | Stop reason: HOSPADM

## 2025-02-25 RX ORDER — SENNA AND DOCUSATE SODIUM 50; 8.6 MG/1; MG/1
1 TABLET, FILM COATED ORAL DAILY
Qty: 30 TABLET | Refills: 0 | Status: SHIPPED | OUTPATIENT
Start: 2025-02-25 | End: 2025-03-27

## 2025-02-25 RX ORDER — ONDANSETRON 2 MG/ML
4 INJECTION INTRAMUSCULAR; INTRAVENOUS
Status: COMPLETED | OUTPATIENT
Start: 2025-02-25 | End: 2025-02-25

## 2025-02-25 RX ORDER — ONDANSETRON 4 MG/1
4 TABLET, ORALLY DISINTEGRATING ORAL 3 TIMES DAILY PRN
Qty: 21 TABLET | Refills: 0 | Status: SHIPPED | OUTPATIENT
Start: 2025-02-25

## 2025-02-25 RX ORDER — PHENYLEPHRINE HCL IN 0.9% NACL 0.4MG/10ML
SYRINGE (ML) INTRAVENOUS
Status: DISCONTINUED | OUTPATIENT
Start: 2025-02-25 | End: 2025-02-25 | Stop reason: SDUPTHER

## 2025-02-25 RX ORDER — LIDOCAINE HYDROCHLORIDE 20 MG/ML
INJECTION, SOLUTION EPIDURAL; INFILTRATION; INTRACAUDAL; PERINEURAL
Status: DISCONTINUED | OUTPATIENT
Start: 2025-02-25 | End: 2025-02-25 | Stop reason: SDUPTHER

## 2025-02-25 RX ORDER — SODIUM CHLORIDE, SODIUM LACTATE, POTASSIUM CHLORIDE, CALCIUM CHLORIDE 600; 310; 30; 20 MG/100ML; MG/100ML; MG/100ML; MG/100ML
INJECTION, SOLUTION INTRAVENOUS
Status: DISCONTINUED | OUTPATIENT
Start: 2025-02-25 | End: 2025-02-25 | Stop reason: SDUPTHER

## 2025-02-25 RX ORDER — SODIUM CHLORIDE 9 MG/ML
INJECTION, SOLUTION INTRAVENOUS PRN
Status: DISCONTINUED | OUTPATIENT
Start: 2025-02-25 | End: 2025-02-25 | Stop reason: HOSPADM

## 2025-02-25 RX ORDER — OXYCODONE HYDROCHLORIDE 5 MG/1
5 TABLET ORAL EVERY 6 HOURS PRN
Qty: 28 TABLET | Refills: 0 | Status: SHIPPED | OUTPATIENT
Start: 2025-02-25 | End: 2025-03-04

## 2025-02-25 RX ORDER — TRAMADOL HYDROCHLORIDE 50 MG/1
50 TABLET ORAL EVERY 4 HOURS PRN
Qty: 30 TABLET | Refills: 0 | Status: SHIPPED | OUTPATIENT
Start: 2025-02-25 | End: 2025-02-25 | Stop reason: HOSPADM

## 2025-02-25 RX ORDER — ACETAMINOPHEN 500 MG
1000 TABLET ORAL ONCE
Status: COMPLETED | OUTPATIENT
Start: 2025-02-25 | End: 2025-02-25

## 2025-02-25 RX ORDER — MAGNESIUM SULFATE HEPTAHYDRATE 40 MG/ML
INJECTION, SOLUTION INTRAVENOUS
Status: DISCONTINUED | OUTPATIENT
Start: 2025-02-25 | End: 2025-02-25 | Stop reason: SDUPTHER

## 2025-02-25 RX ORDER — ROPIVACAINE HYDROCHLORIDE 5 MG/ML
INJECTION, SOLUTION EPIDURAL; INFILTRATION; PERINEURAL PRN
Status: DISCONTINUED | OUTPATIENT
Start: 2025-02-25 | End: 2025-02-25 | Stop reason: ALTCHOICE

## 2025-02-25 RX ORDER — CEFADROXIL 500 MG/1
500 CAPSULE ORAL 2 TIMES DAILY
Qty: 14 CAPSULE | Refills: 0 | Status: SHIPPED | OUTPATIENT
Start: 2025-02-25 | End: 2025-03-04

## 2025-02-25 RX ORDER — ONDANSETRON 2 MG/ML
INJECTION INTRAMUSCULAR; INTRAVENOUS
Status: DISCONTINUED | OUTPATIENT
Start: 2025-02-25 | End: 2025-02-25 | Stop reason: SDUPTHER

## 2025-02-25 RX ORDER — DROPERIDOL 2.5 MG/ML
0.62 INJECTION, SOLUTION INTRAMUSCULAR; INTRAVENOUS
Status: DISCONTINUED | OUTPATIENT
Start: 2025-02-25 | End: 2025-02-25 | Stop reason: HOSPADM

## 2025-02-25 RX ADMIN — BUPIVACAINE HYDROCHLORIDE 10 ML: 2.5 INJECTION, SOLUTION INFILTRATION; PERINEURAL at 09:43

## 2025-02-25 RX ADMIN — PROPOFOL 30 MG: 10 INJECTION, EMULSION INTRAVENOUS at 09:46

## 2025-02-25 RX ADMIN — ACETAMINOPHEN 1000 MG: 500 TABLET ORAL at 08:48

## 2025-02-25 RX ADMIN — BUPIVACAINE HYDROCHLORIDE 18 ML: 2.5 INJECTION, SOLUTION EPIDURAL; INFILTRATION; INTRACAUDAL; PERINEURAL at 09:48

## 2025-02-25 RX ADMIN — MAGNESIUM SULFATE IN WATER 2000 MG: 40 INJECTION, SOLUTION INTRAVENOUS at 10:33

## 2025-02-25 RX ADMIN — DEXAMETHASONE SODIUM PHOSPHATE 4 MG: 4 INJECTION INTRA-ARTICULAR; INTRALESIONAL; INTRAMUSCULAR; INTRAVENOUS; SOFT TISSUE at 10:26

## 2025-02-25 RX ADMIN — CELECOXIB 400 MG: 200 CAPSULE ORAL at 08:48

## 2025-02-25 RX ADMIN — PROPOFOL 30 MG: 10 INJECTION, EMULSION INTRAVENOUS at 10:21

## 2025-02-25 RX ADMIN — HYDROMORPHONE HYDROCHLORIDE 0.5 MG: 1 INJECTION, SOLUTION INTRAMUSCULAR; INTRAVENOUS; SUBCUTANEOUS at 11:17

## 2025-02-25 RX ADMIN — HYDROMORPHONE HYDROCHLORIDE 0.3 MG: 1 INJECTION, SOLUTION INTRAMUSCULAR; INTRAVENOUS; SUBCUTANEOUS at 10:51

## 2025-02-25 RX ADMIN — Medication 80 MCG: at 10:21

## 2025-02-25 RX ADMIN — LIDOCAINE HYDROCHLORIDE 40 MG: 20 INJECTION, SOLUTION EPIDURAL; INFILTRATION; INTRACAUDAL; PERINEURAL at 10:20

## 2025-02-25 RX ADMIN — FENTANYL CITRATE 100 MCG: 50 INJECTION, SOLUTION INTRAMUSCULAR; INTRAVENOUS at 11:17

## 2025-02-25 RX ADMIN — OXYCODONE 5 MG: 5 TABLET ORAL at 13:14

## 2025-02-25 RX ADMIN — TRANEXAMIC ACID 1000 MG: 100 INJECTION, SOLUTION INTRAVENOUS at 10:36

## 2025-02-25 RX ADMIN — PROPOFOL 50 MG: 10 INJECTION, EMULSION INTRAVENOUS at 09:41

## 2025-02-25 RX ADMIN — Medication 80 MCG: at 10:25

## 2025-02-25 RX ADMIN — WATER 2000 MG: 1 INJECTION INTRAMUSCULAR; INTRAVENOUS; SUBCUTANEOUS at 10:30

## 2025-02-25 RX ADMIN — FENTANYL CITRATE 50 MCG: 50 INJECTION, SOLUTION INTRAMUSCULAR; INTRAVENOUS at 10:33

## 2025-02-25 RX ADMIN — FENTANYL CITRATE 50 MCG: 50 INJECTION, SOLUTION INTRAMUSCULAR; INTRAVENOUS at 10:20

## 2025-02-25 RX ADMIN — FENTANYL CITRATE 25 MCG: 50 INJECTION INTRAMUSCULAR; INTRAVENOUS at 13:04

## 2025-02-25 RX ADMIN — SUGAMMADEX 150 MG: 100 INJECTION, SOLUTION INTRAVENOUS at 11:12

## 2025-02-25 RX ADMIN — Medication 3 AMPULE: at 08:48

## 2025-02-25 RX ADMIN — PHENYLEPHRINE HYDROCHLORIDE 30 MCG/MIN: 10 INJECTION INTRAVENOUS at 10:25

## 2025-02-25 RX ADMIN — HYDROMORPHONE HYDROCHLORIDE 0.2 MG: 1 INJECTION, SOLUTION INTRAMUSCULAR; INTRAVENOUS; SUBCUTANEOUS at 11:05

## 2025-02-25 RX ADMIN — ONDANSETRON 4 MG: 2 INJECTION, SOLUTION INTRAMUSCULAR; INTRAVENOUS at 12:20

## 2025-02-25 RX ADMIN — Medication 80 MCG: at 10:54

## 2025-02-25 RX ADMIN — SODIUM CHLORIDE, POTASSIUM CHLORIDE, SODIUM LACTATE AND CALCIUM CHLORIDE: 600; 310; 30; 20 INJECTION, SOLUTION INTRAVENOUS at 10:16

## 2025-02-25 RX ADMIN — FENTANYL CITRATE 25 MCG: 50 INJECTION INTRAMUSCULAR; INTRAVENOUS at 12:00

## 2025-02-25 RX ADMIN — GLYCOPYRROLATE 0.2 MG: 0.2 INJECTION, SOLUTION INTRAMUSCULAR; INTRAVENOUS at 10:28

## 2025-02-25 RX ADMIN — ROCURONIUM BROMIDE 40 MG: 10 INJECTION INTRAVENOUS at 10:21

## 2025-02-25 RX ADMIN — PROPOFOL 80 MG: 10 INJECTION, EMULSION INTRAVENOUS at 10:20

## 2025-02-25 RX ADMIN — ONDANSETRON 4 MG: 2 INJECTION, SOLUTION INTRAMUSCULAR; INTRAVENOUS at 10:49

## 2025-02-25 RX ADMIN — FENTANYL CITRATE 25 MCG: 50 INJECTION INTRAMUSCULAR; INTRAVENOUS at 11:43

## 2025-02-25 ASSESSMENT — PAIN DESCRIPTION - DESCRIPTORS
DESCRIPTORS: SHARP
DESCRIPTORS: SHARP;STABBING
DESCRIPTORS: ACHING;THROBBING
DESCRIPTORS: ACHING

## 2025-02-25 ASSESSMENT — PAIN DESCRIPTION - ORIENTATION
ORIENTATION: RIGHT

## 2025-02-25 ASSESSMENT — PAIN DESCRIPTION - LOCATION
LOCATION: KNEE

## 2025-02-25 ASSESSMENT — PAIN SCALES - GENERAL
PAINLEVEL_OUTOF10: 0
PAINLEVEL_OUTOF10: 7
PAINLEVEL_OUTOF10: 0
PAINLEVEL_OUTOF10: 8
PAINLEVEL_OUTOF10: 0
PAINLEVEL_OUTOF10: 7
PAINLEVEL_OUTOF10: 6
PAINLEVEL_OUTOF10: 7
PAINLEVEL_OUTOF10: 0
PAINLEVEL_OUTOF10: 0

## 2025-02-25 ASSESSMENT — PAIN - FUNCTIONAL ASSESSMENT: PAIN_FUNCTIONAL_ASSESSMENT: 0-10

## 2025-02-25 NOTE — PERIOP NOTE
1427-Received pt.  Vss.  Dressing to right knee intact.  C/o pain to right knee level 6/10, states is tolerable.    1442-Pt. Assisted to bathroom with standby med and use of walker and voided in commode.  Pt became nauseated and vomited a small amount of clear fluid.  Stated nausea passed after vomiting.    1450-Pt. Assisted back to chair.  Ice to knee.  Pt. Tolerating gingerale.    1540-Discharge instructions reviewed w/pt and .  They verbalized understanding.    1601-Pt. And  States ready for discharge.  Vss.  Dressing to right knee intact.  States pain is tolerable.  Pt. Requesting zofran be sent to pharmacy.  Spoke  w/Gela De Santiago Np.  Zofran sent to pharmacy.  Pt discharged via wheelchair to car, accompanied by RN.  Pt discharged awake and alert, respirations equal and unlabored, skin warm, dry, and intact.  Pt and family members' questions and concerns addressed prior to discharge.

## 2025-02-25 NOTE — OP NOTE
Brief Postoperative Note      Patient: Noris Allison  YOB: 1955  MRN: [id number]    Date of Procedure: 2/25/2025    Preoperative diagnosis: Right knee patella arthritis    Postoperative diagnosis: Same       Procedure(s):  RIGHT PATELLA RESURFACING BIOMET PATELLAS (FAST TRACK)    Surgeon(s):  Fabrizio Macias MD    Assistant:  Surgical Assistant: Haja Paniagua  Physician Assistant: Gerard Dumont PA    Anesthesia: General    Estimated Blood Loss (mL): Minimal    Complications: None    Specimens:   * No specimens in log *    Implants:  Implant Name Type Inv. Item Serial No.  Lot No. LRB No. Used Action   COMPONENT PAT [id number] THK8MM STD KNEE TI ALLY S STL UHMWPE - SN/A Joint Component COMPONENT PAT [id number] THK8MM STD KNEE TI ALLY S STL UHMWPE N/A [employer]-WD [id number] Right 1 Implanted   CEMENT BNE RADIOPAQUE FAST SET ACRYL RESIN HI VISC SIMPLEXHV - SN/A Cement CEMENT BNE RADIOPAQUE FAST SET ACRYL RESIN HI VISC SIMPLEXHV N/A [employer] HOW-WD [id number] Right 1 Implanted         Drains: * No LDAs found *    Findings:  Infection Present At Time Of Surgery (PATOS) (choose all levels that have infection present):  No infection present  Other Findings: n/a    Indications: 70-year-old female who had previously undergone successful right total knee arthroplasty.  She presented with anterior knee pain and difficulty on stairs.  She was noted to have some arthritis of her native patella which had previously been left unresurfaced.  Workup for infection or signs of loosening were negative.  We discussed treatment options and recommended resurfacing of the patella.  She understood no guarantees could be given about the outcome and that we cannot guarantee that this would eliminate her pain.    Description of procedure: After being identified in the preoperative holding area and having her operative site marked, she underwent a nerve block to good effect.  She was then

## 2025-02-25 NOTE — ANESTHESIA PRE PROCEDURE
Department of Anesthesiology  Preprocedure Note       Name:  Noris Allison   Age:  70 y.o.  :  1955                                          MRN:  [id number]         Date:  2025      Surgeon: Surgeon(s):  Fabrizio Macias MD    Procedure: Procedure(s):  RIGHT PATELLA RESURFACING BIOMET PATELLAS (FAST TRACK)    Medications prior to admission:   Prior to Admission medications    Medication Sig Start Date End Date Taking? Authorizing Provider   sulfamethoxazole-trimethoprim (BACTRIM DS;SEPTRA DS) 800-160 MG per tablet Take 1 tablet by mouth 2 times daily BID X 7 days for UTI 25   Jazmyn Krause MD   propranolol (INDERAL) 20 MG tablet Take 1 tablet by mouth 2 times daily    Jazmyn Krause MD   esomeprazole Magnesium (NEXIUM) 20 MG PACK Take 1 packet by mouth daily    Jazmyn Krause MD   celecoxib (CELEBREX) 100 MG capsule Take 1 capsule by mouth 2 times daily    Jazmyn Krause MD   Multiple Vitamin (MULTIVITAMIN) TABS tablet Take 1 tablet by mouth daily    Jazmyn Krause MD   methocarbamol (ROBAXIN) 750 MG tablet Take 1 tablet by mouth 3 times daily as needed    Jazmyn Krause MD   diclofenac sodium (VOLTAREN) 1 % GEL Apply 2 g topically 4 times daily as needed for Pain    Jazmyn Krause MD   Calcium Citrate-Vitamin D (CALCIUM CITRATE+D3 PO) Take by mouth in the morning and at bedtime    Jazmyn Krause MD   atorvastatin (LIPITOR) 20 MG tablet Take 1 tablet by mouth daily 24   Jazmyn Krause MD   apixaban (ELIQUIS) 5 MG TABS tablet Take 1 tablet by mouth 2 times daily 18   Jazmyn Krause MD   Omega-3 Fatty Acids (FISH OIL OMEGA-3) 1000 MG CAPS Take 2 capsules by mouth daily    Jazmyn Krause MD   acetaminophen (TYLENOL) 500 MG tablet Take 2 tablets by mouth as needed for Pain    Automatic Reconciliation, Ar   estrogens conjugated (PREMARIN) 0.625 MG/GM CREA vaginal cream Place 0.625 mg vaginally Twice a Week

## 2025-02-25 NOTE — PROGRESS NOTES
Ortho / Neurosurgery NP Note    POD# 0  s/p RIGHT PATELLA RESURFACING BIOMET PATELLAS (FAST TRACK)     Patient seen in PACU  Reports expected pain. Given oxycodone. States minimal relief w Tramadol  Tolerating diet. No nausea  Planned for Fast Track discharge today-would like PT eval to practice steps        VSS Afebrile.    Visit Vitals  /70   Pulse 57   Temp 97.5 °F (36.4 °C) (Oral)   Resp 12   Ht 1.6 m (5' 3\")   Wt 77.8 kg (171 lb 8.3 oz)   SpO2 96%   BMI 30.38 kg/m²       Voiding status: due to void            Labs    Lab Results   Component Value Date/Time    HGB 9.0 11/13/2019 02:49 AM      Lab Results   Component Value Date/Time    INR 1.1 02/18/2025 10:29 AM      Lab Results   Component Value Date/Time     11/13/2019 02:49 AM    K 3.3 11/13/2019 02:49 AM     11/13/2019 02:49 AM    CO2 25 11/13/2019 02:49 AM    BUN 11 11/13/2019 02:49 AM     Recent Glucose Results:   Glucose   Date Value Ref Range Status   11/13/2019 102 (H) 65 - 100 mg/dL Final   11/12/2019 114 (H) 65 - 100 mg/dL Final   11/04/2019 88 65 - 100 mg/dL Final           Body mass index is 30.38 kg/m². : A BMI > 30 is classified as obesity and > 40 is classified as morbid obesity.     Awake and alert. No acute distress.    Dressing: Ace Wrap C.D.I.   No significant erythema or swelling  Cryotherapy in place over incision.   Calves soft and supple; No pain with passive stretch    BLE sensation to light touch intact  BLE motor intact.     SCD for mechanical DVT proph while in bed        PLAN:  1) PT  - WBAT.   2) DVT Prophylaxis: Aspirin 81 mg BID   3) GI Prophylaxis - Pepcid   4) Pain control - scheduled tylenol  , and prn  oxycodone    5) Readiness for discharge:     [x] Vital Signs stable       [] + Voiding    [x] Wound intact, drainage minimal    [x] Tolerating PO intake     [] Cleared by PT (OT if applicable) for discharge   [x] Adequate pain control on oral medication alone        Patient moving over to Fast Track Recovery

## 2025-02-25 NOTE — H&P
Date of Surgery Update:  Noris Allison was seen and examined on the day of surgery prior to the procedure by the surgical team.    There were no significant clinical changes since the completion of the History and Physical.    Exam today prior to surgery showed no acute cardiac findings, no respiratory difficulty, and no abdominal complaints or pain.       Signed By: Gerard Dumont PA-C    February 25, 2025 9:48 AM

## 2025-02-25 NOTE — FLOWSHEET NOTE
02/25/25 1124   Handoff   Communication Given Transfer Handoff   Handoff Given To Jimmy SAUER   Handoff Received From Oscar GIPSON/ Romeo RN   Handoff Communication Face to Face;At bedside   Time Handoff Given 1124     1400- Pt updated on pt status

## 2025-02-25 NOTE — ANESTHESIA POSTPROCEDURE EVALUATION
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    Virgil Phase I: Virgil Score: 8    Virgil Phase II:      Anesthesia Post Evaluation    Patient location during evaluation: bedside  Patient participation: complete - patient participated  Level of consciousness: awake and alert  Pain scale: Controlled per protocol.  Airway patency: patent  Nausea & Vomiting: no nausea and no vomiting  Cardiovascular status: hemodynamically stable  Respiratory status: acceptable  Hydration status: stable  Multimodal analgesia pain management approach  Pain management: adequate    No notable events documented.

## 2025-02-25 NOTE — PROGRESS NOTES
PHYSICAL THERAPY EVALUATION/DISCHARGE    Patient: Noris Allison (70 y.o. 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  Patient received in Fast track room and agreeable to participate.  Good tolerance of seated exercises and then ambulated with RW x approx 120 feet.  Gait steady with decreased pace and step to pattern.  Went up and down 4 steps x two trials , first holding left rail, then again holding right  rail.  Reviewed pain management, car transfer, and pacing and progression of activity.  Left up in chair with RN in room.      Patient is cleared to discharge, plans to go to OPPT.     Functional Outcome Measure:  The patient scored 24/24 on the [facility] outcome measure which is indicative of Cutoff score <=171,2,3 had higher odds of discharging home with home health or need of SNF/IPR..          Further skilled acute physical therapy is not indicated at this time.       PLAN :  Recommendation for discharge: (in order for the patient to meet his/her long term goals):   Outpatient physical therapy for knee     Other factors to consider for discharge: no additional factors    IF patient discharges home will need the following DME:  RW has been delivered for discharge       SUBJECTIVE:   Patient stated “I don't feel well but I'm okay.”    OBJECTIVE DATA SUMMARY:     Past Medical History:   Diagnosis Date    Anxiety     Cervical spinal stenosis     Chondral defect of right patella     Depression     GERD (gastroesophageal reflux disease)     H/O hammer toe

## 2025-02-25 NOTE — PROGRESS NOTES
Visited patient in PACU.  Patient given RW from consignment for home use.      Discussed the importance of using pain medication and other methods for pain control such as ice/rest/elevate, pre-medicating prior to physical therapy.  Discussed the importance of being safe at hospital and home by using RW until cleared by PT, wearing safe shoes, preparing home for after surgery and having a  to help at home.  Discussed the importance of home PT, daily exercises as instructed by physical therapist and post-op appointment with surgeon and the need for transportation to this appointment until the surgeon has cleared you for driving.    Discussed risk after surgery and the importance of preventing infection by good hand hygiene, using clean towels and wash cloths at each shower, inspect wound/dressing daily, moving every two hours while awake, using the incentive spirometer every hour while awake.  When to call the doctor for help, or when to call 911 for emergency.  Opportunity given for patient/family to ask additional questions about any special concerns regarding your care while on the Ortho unit and preparing for discharge.

## 2025-02-25 NOTE — PERIOP NOTE
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 Macias    Assistant(s): Circulator: Yazmin Sanchez RN  Surgical Assistant: Haja Paniagua  Scrub Person First: Johnathon Blackwell RN  Physician Assistant: Gerard Dumont PA    Specimens: * No specimens in log *    Assessment:  Intra-procedure medications         Anesthesia gave intra-procedure sedation and medications, see anesthesia flow sheet     Intravenous fluids: LR@ KVO     Vital signs stable       Recommendation:    Permission to share finding with

## 2025-02-25 NOTE — FLOWSHEET NOTE
02/25/25 1415   Handoff   Communication Given Transfer Handoff   Handoff Given To Dory SAUER   Handoff Received From Jimmy SAUER   Handoff Communication Face to Face;In department   Time Handoff Given 1415

## 2025-02-25 NOTE — ANESTHESIA PROCEDURE NOTES
Peripheral Block    Patient location during procedure: holding area  Reason for block: post-op pain management and at surgeon's request  Start time: 2/25/2025 9:48 AM  End time: 2/25/2025 9:52 AM  Staffing  Performed: anesthesiologist   Anesthesiologist: Kaylen Grayson MD  Performed by: Kaylen Grayson MD  Authorized by: Kaylen Grayson MD    Preanesthetic Checklist  Completed: patient identified, IV checked, site marked, risks and benefits discussed, surgical/procedural consents, equipment checked, pre-op evaluation, timeout performed, anesthesia consent given, oxygen available, monitors applied/VS acknowledged, fire risk safety assessment completed and verbalized and blood product R/B/A discussed and consented  Peripheral Block   Patient position: supine  Prep: ChloraPrep  Provider prep: sterile gloves, mask and sterile gown  Patient monitoring: cardiac monitor, continuous pulse ox, continuous capnometry, frequent blood pressure checks, IV access, oxygen and responsive to questions  Block type: Anterior knee (Geniculars in divided doses)  Laterality: right  Injection technique: single-shot  Guidance: ultrasound guided    Needle   Needle type: insulated echogenic nerve stimulator needle   Needle gauge: 22 G  Needle localization: ultrasound guidance  Needle insertion depth: 4 cm  Needle length: 5 cm  Assessment   Injection assessment: negative aspiration for heme, no paresthesia on injection, local visualized surrounding nerve on ultrasound and no intravascular symptoms  Paresthesia pain: none  Slow fractionated injection: yes  Hemodynamics: stable  Outcomes: uncomplicated    Additional Notes  Superior Medial & Lateral  Inferior Medial  Medications Administered  BUPivacaine (MARCAINE) PF injection 0.25% - Perineural   18 mL - 2/25/2025 9:48:00 AM

## 2025-02-25 NOTE — ANESTHESIA PROCEDURE NOTES
Peripheral Block    Patient location during procedure: holding area  Reason for block: post-op pain management and at surgeon's request  Start time: 2/25/2025 9:43 AM  End time: 2/25/2025 9:47 AM  Staffing  Performed: anesthesiologist   Anesthesiologist: Kaylen Grayson MD  Performed by: Kaylen Grayson MD  Authorized by: Kaylen Grayson MD    Preanesthetic Checklist  Completed: patient identified, IV checked, site marked, risks and benefits discussed, surgical/procedural consents, equipment checked, pre-op evaluation, timeout performed, anesthesia consent given, oxygen available, monitors applied/VS acknowledged, fire risk safety assessment completed and verbalized and blood product R/B/A discussed and consented  Peripheral Block   Patient position: supine  Prep: ChloraPrep  Provider prep: sterile gloves, mask and sterile gown  Patient monitoring: cardiac monitor, continuous pulse ox, continuous capnometry, frequent blood pressure checks, IV access, oxygen and responsive to questions  Block type: Femoral  Adductor canal  Laterality: right  Injection technique: single-shot  Guidance: ultrasound guided    Needle   Needle type: insulated echogenic nerve stimulator needle   Needle gauge: 22 G  Needle localization: ultrasound guidance  Needle insertion depth: 4 cm  Needle length: 5 cm  Assessment   Injection assessment: negative aspiration for heme, no paresthesia on injection, local visualized surrounding nerve on ultrasound and no intravascular symptoms  Paresthesia pain: none  Slow fractionated injection: yes  Hemodynamics: stable  Outcomes: uncomplicated    Medications Administered  BUPivacaine (MARCAINE) injection 0.25% - Perineural   10 mL - 2/25/2025 9:43:00 AM

## 2025-04-08 ENCOUNTER — OFFICE VISIT (OUTPATIENT)
Age: 70
End: 2025-04-08
Payer: MEDICARE

## 2025-04-08 VITALS
SYSTOLIC BLOOD PRESSURE: 138 MMHG | HEART RATE: 67 BPM | RESPIRATION RATE: 14 BRPM | BODY MASS INDEX: 30.41 KG/M2 | OXYGEN SATURATION: 99 % | HEIGHT: 63 IN | TEMPERATURE: 97.7 F | WEIGHT: 171.6 LBS | DIASTOLIC BLOOD PRESSURE: 88 MMHG

## 2025-04-08 DIAGNOSIS — G25.0 BENIGN ESSENTIAL TREMOR SYNDROME: ICD-10-CM

## 2025-04-08 DIAGNOSIS — M47.22 CERVICAL RADICULOPATHY DUE TO DEGENERATIVE JOINT DISEASE OF SPINE: ICD-10-CM

## 2025-04-08 DIAGNOSIS — M47.27 LUMBOSACRAL RADICULOPATHY DUE TO DEGENERATIVE JOINT DISEASE OF SPINE: ICD-10-CM

## 2025-04-08 DIAGNOSIS — I65.23 BILATERAL CAROTID ARTERY STENOSIS: Primary | ICD-10-CM

## 2025-04-08 DIAGNOSIS — R48.2 GAIT APRAXIA OF ELDERLY: ICD-10-CM

## 2025-04-08 DIAGNOSIS — R29.898 WEAKNESS OF BOTH LEGS: ICD-10-CM

## 2025-04-08 DIAGNOSIS — I67.89 CEREBRAL MICROVASCULAR DISEASE: ICD-10-CM

## 2025-04-08 PROCEDURE — 1036F TOBACCO NON-USER: CPT | Performed by: PSYCHIATRY & NEUROLOGY

## 2025-04-08 PROCEDURE — 1090F PRES/ABSN URINE INCON ASSESS: CPT | Performed by: PSYCHIATRY & NEUROLOGY

## 2025-04-08 PROCEDURE — G8427 DOCREV CUR MEDS BY ELIG CLIN: HCPCS | Performed by: PSYCHIATRY & NEUROLOGY

## 2025-04-08 PROCEDURE — 99214 OFFICE O/P EST MOD 30 MIN: CPT | Performed by: PSYCHIATRY & NEUROLOGY

## 2025-04-08 PROCEDURE — G8399 PT W/DXA RESULTS DOCUMENT: HCPCS | Performed by: PSYCHIATRY & NEUROLOGY

## 2025-04-08 PROCEDURE — 1159F MED LIST DOCD IN RCRD: CPT | Performed by: PSYCHIATRY & NEUROLOGY

## 2025-04-08 PROCEDURE — 1126F AMNT PAIN NOTED NONE PRSNT: CPT | Performed by: PSYCHIATRY & NEUROLOGY

## 2025-04-08 PROCEDURE — G8417 CALC BMI ABV UP PARAM F/U: HCPCS | Performed by: PSYCHIATRY & NEUROLOGY

## 2025-04-08 PROCEDURE — 3017F COLORECTAL CA SCREEN DOC REV: CPT | Performed by: PSYCHIATRY & NEUROLOGY

## 2025-04-08 PROCEDURE — 1160F RVW MEDS BY RX/DR IN RCRD: CPT | Performed by: PSYCHIATRY & NEUROLOGY

## 2025-04-08 PROCEDURE — 1123F ACP DISCUSS/DSCN MKR DOCD: CPT | Performed by: PSYCHIATRY & NEUROLOGY

## 2025-04-08 RX ORDER — DEXAMETHASONE 4 MG/1
4 TABLET ORAL 2 TIMES DAILY WITH MEALS
COMMUNITY

## 2025-04-08 RX ORDER — AMANTADINE HYDROCHLORIDE 100 MG/1
100 CAPSULE, GELATIN COATED ORAL
Qty: 90 CAPSULE | Refills: 3 | Status: SHIPPED | OUTPATIENT
Start: 2025-04-08

## 2025-04-08 ASSESSMENT — PATIENT HEALTH QUESTIONNAIRE - PHQ9
SUM OF ALL RESPONSES TO PHQ QUESTIONS 1-9: 0
2. FEELING DOWN, DEPRESSED OR HOPELESS: NOT AT ALL
SUM OF ALL RESPONSES TO PHQ QUESTIONS 1-9: 0
1. LITTLE INTEREST OR PLEASURE IN DOING THINGS: NOT AT ALL

## 2025-04-08 NOTE — PROGRESS NOTES
Noris ANA LUISA PuenteAllison is a 70 y.o. female    Chief Complaint   Patient presents with    Follow-up     6 month Benign essential tremor syndrome     Vitals:    04/08/25 1518 04/08/25 1530   BP: (!) 140/90 (!) 140/90   BP Site: Left Upper Arm Left Upper Arm   Patient Position: Sitting Sitting   Pulse: 67    Resp: 14    Temp: 97.7 °F (36.5 °C)    SpO2: 99%    Weight: 77.8 kg (171 lb 9.6 oz)    Height: 1.6 m (5' 3\")          Health Maintenance Due   Topic Date Due    Lipids  Never done    Hepatitis C screen  Never done    Shingles vaccine (1 of 2) Never done    Pneumococcal 50+ years Vaccine (1 of 1 - PCV) Never done    COVID-19 Vaccine (1 - 2024-25 season) Never done    Annual Wellness Visit (Medicare)  02/09/2025         \"Have you been to the ER, urgent care clinic since your last visit?  Hospitalized since your last visit?\"    NO    “Have you seen or consulted any other health care providers outside of [facility] since your last visit?”    NO

## 2025-04-08 NOTE — PROGRESS NOTES
Consult  REFERRED BY:  Christiana Bonilla MD    CHIEF COMPLAINT: Patient seen for new problem of progressive tremors in her hands, left greater than right from her essential tremors, and it is not interfering with her ability to function and would like more medication.  She is already on 10 mg of Inderal 3 times a day, and has tried to increase the dose but becomes hypotensive and cannot tolerate it.  She is on Eliquis and cannot take Mysoline, because of side effects and adverse reaction to the medication..  She is already on other antidepressants so Remeron does not seem to be a good candidate either.  I think the medication that would least affect her Eliquis might be amantadine we will try her on 100 mg of amantadine once every morning and she will continue the Inderal 10 mg 3 times a day and see how she does with follow-up in 6 months time.  She reports has benign essential tremors.      Subjective:     Noris Allison is a 70 y.o. right-handed  female we are seeing at the request of Dr. Bonilla, for evaluation of new problem of Patient seen for new problem of progressive tremors in her hands, left greater than right from her essential tremors, and it is not interfering with her ability to function and would like more medication.  She is already on 10 mg of Inderal 3 times a day, and has tried to increase the dose but becomes hypotensive and cannot tolerate it.  She is on Eliquis and cannot take Mysoline because of side effects and adverse reactions to the medication..  She is already on other antidepressants so Remeron does not seem to be a good candidate either.  I think the medication that would least affect her Eliquis might be amantadine we will try her on 100 mg of amantadine once every morning and she will continue the Inderal 10 mg 3 times a day and see how she does with follow-up in 6 months time.  She reports has benign essential tremors.  Patient previously seen for  having falls because of

## 2025-07-03 ENCOUNTER — TRANSCRIBE ORDERS (OUTPATIENT)
Facility: HOSPITAL | Age: 70
End: 2025-07-03

## 2025-07-03 DIAGNOSIS — M54.16 LUMBAR RADICULOPATHY: Primary | ICD-10-CM

## 2025-07-03 DIAGNOSIS — M47.817 LUMBOSACRAL SPONDYLOSIS WITHOUT MYELOPATHY: ICD-10-CM

## 2025-07-15 ENCOUNTER — HOSPITAL ENCOUNTER (OUTPATIENT)
Facility: HOSPITAL | Age: 70
Discharge: HOME | End: 2025-07-18
Attending: STUDENT IN AN ORGANIZED HEALTH CARE EDUCATION/TRAINING PROGRAM
Payer: MEDICARE

## 2025-07-15 DIAGNOSIS — M54.16 LUMBAR RADICULOPATHY: ICD-10-CM

## 2025-07-15 DIAGNOSIS — M47.817 LUMBOSACRAL SPONDYLOSIS WITHOUT MYELOPATHY: ICD-10-CM

## 2025-07-15 PROCEDURE — 72148 MRI LUMBAR SPINE W/O DYE: CPT

## (undated) DEVICE — TUBESET BNE PREP CO2 CARBOJET

## (undated) DEVICE — CONTAINER SPEC 20 ML LID NEUT BUFF FORMALIN 10 % POLYPR STS

## (undated) DEVICE — 3M™ IOBAN™ 2 ANTIMICROBIAL INCISE DRAPE 6650EZ: Brand: IOBAN™ 2

## (undated) DEVICE — 4-PORT MANIFOLD: Brand: NEPTUNE 2

## (undated) DEVICE — GLOVE SURG SZ 85 L12IN FNGR THK79MIL GRN LTX FREE

## (undated) DEVICE — SET ADMIN 16ML TBNG L100IN 2 Y INJ SITE IV PIGGY BK DISP (ORDER IN MULIPLES OF 48)

## (undated) DEVICE — SET ADMIN 16ML TBNG L100IN 2 Y INJ SITE IV PIGGY BK DISP

## (undated) DEVICE — SOLIDIFIER FLD 2OZ 1500CC N DISINF IN BTL DISP SAFESORB

## (undated) DEVICE — SOLUTION IRRIG 1000ML H2O STRL BLT

## (undated) DEVICE — GLOVE ORTHO 8   MSG9480

## (undated) DEVICE — ADHESIVE SKIN CLOSURE 4X22 CM PREMIERPRO EXOFINFUSION DISP

## (undated) DEVICE — GARMENT,MEDLINE,DVT,INT,CALF,MED, GEN2: Brand: MEDLINE

## (undated) DEVICE — 1200 GUARD II KIT W/5MM TUBE W/O VAC TUBE: Brand: GUARDIAN

## (undated) DEVICE — TRAP FLUID BUFFALO FLTR

## (undated) DEVICE — TOWEL 4 PLY TISS 19X30 SUE WHT

## (undated) DEVICE — SOLUTION IV 1000ML 0.9% SOD CHL

## (undated) DEVICE — STRAP,POSITIONING,KNEE/BODY,FOAM,4X60": Brand: MEDLINE

## (undated) DEVICE — TRANSFER SET 3": Brand: MEDLINE INDUSTRIES, INC.

## (undated) DEVICE — APPLICATOR MEDICATED 26 CC SOLUTION HI LT ORNG CHLORAPREP

## (undated) DEVICE — DRESSING FOAM POST OPERATIVE 4X10 IN MEPILEX BORDER AG

## (undated) DEVICE — SMOKE EVACUATION PENCIL: Brand: VALLEYLAB

## (undated) DEVICE — PACK SURG PROC KNEE USER GPS

## (undated) DEVICE — BAG SPEC BIOHZRD 10 X 10 IN --

## (undated) DEVICE — BANDAGE COMPR M W6INXL10YD WHT BGE VELC E MTRX HK AND LOOP

## (undated) DEVICE — SOL IRR SOD CHL 0.9% TITAN XL CNTNR 3000ML

## (undated) DEVICE — SYR 50ML LR LCK 1ML GRAD NSAF --

## (undated) DEVICE — GLOVE SURG SZ 8 L12IN FNGR THK79MIL GRN LTX FREE

## (undated) DEVICE — BASIN EMSIS 16OZ GRAPHITE PLAS KID SHP MOLD GRAD FOR ORAL

## (undated) DEVICE — SYR 20ML LL STRL LF --

## (undated) DEVICE — Z DISCONTINUED PER MEDLINE LINE GAS SAMPLING O2/CO2 LNG AD 13 FT NSL W/ TBNG FILTERLINE

## (undated) DEVICE — BLOCK BITE ENDOSCP AD 21 MM W/ DIL BLU LF DISP

## (undated) DEVICE — HYPODERMIC SAFETY NEEDLE: Brand: MAGELLAN

## (undated) DEVICE — 2.5MM DRILL BIT/QC/GOLD/110MM

## (undated) DEVICE — STRYKER PERFORMANCE SERIES SAGITTAL BLADE: Brand: STRYKER PERFORMANCE SERIES

## (undated) DEVICE — NEONATAL-ADULT SPO2 SENSOR: Brand: NELLCOR

## (undated) DEVICE — FCPS RAD JAW 4LC 240CM W/NDL -- BX/40

## (undated) DEVICE — SUTURE ABSRB L30CM 2-0 VLT SPRL PDS + STRATAFIX SXPP1B410

## (undated) DEVICE — Device

## (undated) DEVICE — TOTAL JOINT-MRMC: Brand: MEDLINE INDUSTRIES, INC.

## (undated) DEVICE — SUTURE VCRL SZ 0 L27IN ABSRB UD L36MM CT-1 1/2 CIR J260H

## (undated) DEVICE — 60-7070-105 TRNQT,DPSB,PLC BLUE: Brand: MEDLINE RENEWAL

## (undated) DEVICE — SOLIDIFIER MEDC 1200ML -- CONVERT TO 356117

## (undated) DEVICE — HANDLE LT SNAP ON ULT DURABLE LENS FOR TRUMPF ALC DISPOSABLE

## (undated) DEVICE — INFECTION CONTROL KIT SYS

## (undated) DEVICE — SYR 10ML LUER LOK 1/5ML GRAD --

## (undated) DEVICE — PADDING CAST W6INXL4YD COT COHESIVE HND TEARABLE SPEC 100

## (undated) DEVICE — CEMENT MIXING SYSTEM WITH FEMORAL BREAKWAY NOZZLE: Brand: REVOLUTION

## (undated) DEVICE — CATH IV AUTOGRD BC BLU 22GA 25 -- INSYTE

## (undated) DEVICE — SUTURE ABSORBABLE MONOFILAMENT 1 CTX 36 CM 48 MM VIO PDS +

## (undated) DEVICE — SYRINGE MED 3ML CLR PLAS STD N CTRL LUERLOCK TIP DISP

## (undated) DEVICE — Device: Brand: JELCO

## (undated) DEVICE — SUTURE VICRYL SZ 1 L36IN ABSRB UD L36MM CT-1 1/2 CIR J947H

## (undated) DEVICE — NEEDLE HYPO 18GA L1.5IN PNK S STL HUB POLYPR SHLD REG BVL

## (undated) DEVICE — SYRINGE MED 10ML LUERLOCK TIP W/O SFTY DISP

## (undated) DEVICE — STERILE POLYISOPRENE POWDER-FREE SURGICAL GLOVES: Brand: PROTEXIS

## (undated) DEVICE — FORCEPS BX L160CM DIA8MM GRSP DISECT CUP TIP NONLOCKING ROT

## (undated) DEVICE — SYR 3ML LL TIP 1/10ML GRAD --

## (undated) DEVICE — PREP KIT PEEL PTCH POVIDONE IOD

## (undated) DEVICE — SUTURE VCRL SZ 1 L27IN ABSRB VLT L36MM CT-1 1/2 CIR J341H

## (undated) DEVICE — STERILE POLYISOPRENE POWDER-FREE SURGICAL GLOVES WITH EMOLLIENT COATING: Brand: PROTEXIS

## (undated) DEVICE — PIN EXT FIX SCHNZ 3 MM

## (undated) DEVICE — LIQUIBAND RAPID ADHESIVE 36/CS 0.8ML: Brand: MEDLINE

## (undated) DEVICE — SUTURE ABSORBABLE MONOFILAMENT 2-0 WND CLOSURE GRN V-LOC 180 VLOCL0315

## (undated) DEVICE — REM POLYHESIVE ADULT PATIENT RETURN ELECTRODE: Brand: VALLEYLAB

## (undated) DEVICE — SUTURE STRATAFIX SPRL SZ 1 L14IN ABSRB VLT L48CM CTX 1/2 SXPD2B405

## (undated) DEVICE — SOLUTION IRRIG 3000ML 0.9% SOD CHL FLX CONT 0797208] ICU MEDICAL INC]

## (undated) DEVICE — DRAPE,REIN 53X77,STERILE: Brand: MEDLINE

## (undated) DEVICE — (D)PREP SKN CHLRAPRP APPL 26ML -- CONVERT TO ITEM 371833

## (undated) DEVICE — SUTURE STRATAFIX SZ 3-0 30CM NONABSORB UD 26MM FS 3/8 SXMP2B412

## (undated) DEVICE — HANDPIECE SET WITH COAXIAL HIGH FLOW TIP AND SUCTION TUBE: Brand: INTERPULSE

## (undated) DEVICE — ELECTRODE,RADIOTRANSLUCENT,FOAM,5PK: Brand: MEDLINE

## (undated) DEVICE — KENDALL RADIOLUCENT FOAM MONITORING ELECTRODE RECTANGULAR SHAPE: Brand: KENDALL

## (undated) DEVICE — Z DISCONTINUED USE 2717541 SUTURE STRATAFIX SZ 3-0 L30CM NONABSORBABLE UD L26MM FS 3/8

## (undated) DEVICE — CATH IV AUTOGRD BC PNK 20GA 25 -- INSYTE

## (undated) DEVICE — ZIMMER® STERILE DISPOSABLE TOURNIQUET CUFF WITH PROTECTIVE SLEEVE AND PLC, DUAL PORT, SINGLE BLADDER, 34 IN. (86 CM)

## (undated) DEVICE — SYRINGE 20ML LL S/C 50

## (undated) DEVICE — DEVICE TRNSF SPIK STL 2008S] MICROTEK MEDICAL INC]

## (undated) DEVICE — DRAPE,ORTHOMAX,EXTREMITY: Brand: MEDLINE

## (undated) DEVICE — SOLUTION IRRIG 1000ML H2O PIC PLAS SHATTERPROOF CONTAINER